# Patient Record
Sex: FEMALE | Race: WHITE | NOT HISPANIC OR LATINO | Employment: OTHER | URBAN - METROPOLITAN AREA
[De-identification: names, ages, dates, MRNs, and addresses within clinical notes are randomized per-mention and may not be internally consistent; named-entity substitution may affect disease eponyms.]

---

## 2018-06-18 DIAGNOSIS — R13.12 OROPHARYNGEAL DYSPHAGIA: Primary | ICD-10-CM

## 2018-06-27 ENCOUNTER — HOSPITAL ENCOUNTER (OUTPATIENT)
Dept: RADIOLOGY | Facility: HOSPITAL | Age: 78
Discharge: HOME | End: 2018-06-27
Attending: OTOLARYNGOLOGY
Payer: MEDICARE

## 2018-06-27 DIAGNOSIS — R13.12 OROPHARYNGEAL DYSPHAGIA: ICD-10-CM

## 2018-06-27 PROCEDURE — G8996 SWALLOW CURRENT STATUS: HCPCS | Mod: CI

## 2018-06-27 PROCEDURE — 74230 X-RAY XM SWLNG FUNCJ C+: CPT | Mod: 26,,, | Performed by: RADIOLOGY

## 2018-06-27 PROCEDURE — 74230 X-RAY XM SWLNG FUNCJ C+: CPT | Mod: TC

## 2018-06-27 PROCEDURE — 92611 MOTION FLUOROSCOPY/SWALLOW: CPT

## 2018-06-27 PROCEDURE — 97535 SELF CARE MNGMENT TRAINING: CPT

## 2018-06-27 PROCEDURE — G8997 SWALLOW GOAL STATUS: HCPCS | Mod: CI

## 2018-06-27 PROCEDURE — G8998 SWALLOW D/C STATUS: HCPCS | Mod: CI

## 2018-06-27 NOTE — PROCEDURES
"Modified Barium Swallow    Patient Name:  Marya Carranza   MRN:  [id number]      Recommendations:     Recommendations:                General Recommendations:  Dysphagia therapy with outpatient rehab  Diet recommendations:  Regular, Thin   Aspiration Precautions: 1 bite/sip at a time, Alternating bites/sips, Feed only when awake/alert, Frequent oral care, HOB to 90 degrees, No straws, Remain upright 30 minutes post meal, Small bites/sips and Strict aspiration precautions   General Precautions: Standard,    Communication strategies:  none    Referral     Reason for Referral  Patient was referred for a Modified Barium Swallow Study to assess the efficiency of his/her swallow function, rule out aspiration and make recommendations regarding safe dietary consistencies, effective compensatory strategies, and safe eating environment. Pt reporting "choking on own saliva" and difficulty "getting food down." Pt with PMH significant for GERD--pt on RX for reflux. Pt reported swallowing difficulties began 1 year ago--no PNA or weight loss reported.    Objective:     Current Respiratory Status: 06/27/18    Alert: yes    Cooperative: yes    Follows Directions: yes    Visualization  · Patient was seen in the lateral view    Oral Peripheral Examination  · Oral Musculature: WNL  · Dentition: present and adequate  · Secretion Management: adequate  · Mucosal Quality: good  · Mandibular Strength and Mobility: WNL  · Oral Labial Strength and Mobility: WNL  · Lingual Strength and Mobility: WNL  · Buccal Strength and Mobility: WNL  · Volitional Cough: Strong  · Volitional Swallow: Weak  · Voice Prior to PO Intake: Strong, clear, dry    Consistencies Assessed  · Thin (x1 10cc cup sip, x4 self-regulated, self-fed cup sips)  · Nectar thick (x1 self-regulated, self-fed cup sip)  · Puree (x2 full tsp bites)  · Solids (x1 1/2 piece of argentina cracker with barium)    Oral Preparation/Oral Phase  · WFL- Pt with adequate bolus acceptance, containment, " control and timely A-P transfer across consistencies     Pharyngeal Phase   Pt presenting with moderate pharyngeal dysphagia c/b dcr hyolaryngeal elevation resulting in dcr airway closure and flash penetration of thin liquids, nectar thick liquids, and purees at level of laryngeal vestibule. No penetration noted with solids. No aspiration noted with any consistencies during limited MBSS. Deviant epiglottic inversion noted contributing to dcr airway closure and flash penetration of thin liquids, nectar thick liquids, and puree consistencies. With each instance of flash penetration, pt demonstrated throat clear. Delayed coughing noted after swallow--when flouro turned on, no aspiration materials noted in airway at conclusion of MBSS. Mild dcr in BOT retraction noted with mild vallecular residue noted after swallow. Pt able to clear mild vallecular residue independently with liquid wash or subsequent spontaneous swallow.     Cervical Esophageal Phase  · Decreased UES opening--mild pyriform sinus residue noted after swallow  · Retrograde flow of all consistencies noted.  · Opening peristalsis     Assessment:     Impressions  ·   Patient demonstrates moderate  pharyngeal dysphagia characterized by dcr hyolaryngeal elevation resulting in dcr airway closure and flash penetration of thin liquids, nectar thick liquids, and purees at level of laryngeal vestibule. Deviant epiglottic inversion noted contributing to dcr airway closure and flash penetration. Mild dcr in BOT retraction noted with mild vallecular residue noted after swallow.     Prognosis: Good    Barriers:  · None    Plan  Pt is safe for regular solids/thin liquids with strict adherence to aforementioned swallow precautions. Pt would benefit from skilled ST services in outpatient setting for indirect and direct dysphagia tx. Recommend pt f/u with esophagram in order to re-assess reflux and esophageal component of dysphagia.    Education  Results were discussed  with patient. Results were discussed with Medical Team who was in agreement with plan.      Recommendations:  outpatient speech therapy     Time Tracking:   SLP Treatment Date:   06/27/18  Speech Start Time:  0945  Speech Stop Time:  1015     Speech Total Time (min):  30 min    Aparna Garcia CCC-SLP  06/27/2018

## 2018-07-11 ENCOUNTER — TELEPHONE (OUTPATIENT)
Dept: OTOLARYNGOLOGY | Facility: CLINIC | Age: 78
End: 2018-07-11

## 2018-07-11 DIAGNOSIS — R13.12 OROPHARYNGEAL DYSPHAGIA: Primary | ICD-10-CM

## 2019-01-02 ENCOUNTER — HOSPITAL ENCOUNTER (OUTPATIENT)
Dept: RADIOLOGY | Facility: HOSPITAL | Age: 79
Discharge: HOME | End: 2019-01-02
Attending: INTERNAL MEDICINE
Payer: MEDICARE

## 2019-01-02 DIAGNOSIS — J40 BRONCHITIS: ICD-10-CM

## 2019-01-02 DIAGNOSIS — J40 BRONCHITIS: Primary | ICD-10-CM

## 2019-01-02 PROCEDURE — 71046 XR CHEST PA AND LATERAL: ICD-10-PCS | Mod: 26,,, | Performed by: RADIOLOGY

## 2019-01-02 PROCEDURE — 71046 X-RAY EXAM CHEST 2 VIEWS: CPT | Mod: 26,,, | Performed by: RADIOLOGY

## 2019-01-02 PROCEDURE — 71046 X-RAY EXAM CHEST 2 VIEWS: CPT | Mod: TC,FY

## 2019-10-11 ENCOUNTER — OFFICE VISIT (OUTPATIENT)
Dept: CARDIOLOGY | Facility: CLINIC | Age: 79
End: 2019-10-11
Payer: MEDICARE

## 2019-10-11 VITALS
DIASTOLIC BLOOD PRESSURE: 83 MMHG | SYSTOLIC BLOOD PRESSURE: 174 MMHG | RESPIRATION RATE: 16 BRPM | OXYGEN SATURATION: 95 % | WEIGHT: 137.25 LBS | HEART RATE: 78 BPM

## 2019-10-11 DIAGNOSIS — I25.10 CORONARY ARTERY DISEASE, ANGINA PRESENCE UNSPECIFIED, UNSPECIFIED VESSEL OR LESION TYPE, UNSPECIFIED WHETHER NATIVE OR TRANSPLANTED HEART: Primary | ICD-10-CM

## 2019-10-11 DIAGNOSIS — Z76.89 ESTABLISHING CARE WITH NEW DOCTOR, ENCOUNTER FOR: ICD-10-CM

## 2019-10-11 PROCEDURE — 99999 PR PBB SHADOW E&M-EST. PATIENT-LVL III: ICD-10-PCS | Mod: PBBFAC,,, | Performed by: INTERNAL MEDICINE

## 2019-10-11 PROCEDURE — 93000 EKG 12-LEAD: ICD-10-PCS | Mod: S$GLB,,, | Performed by: INTERNAL MEDICINE

## 2019-10-11 PROCEDURE — 99204 PR OFFICE/OUTPT VISIT, NEW, LEVL IV, 45-59 MIN: ICD-10-PCS | Mod: S$GLB,,, | Performed by: INTERNAL MEDICINE

## 2019-10-11 PROCEDURE — 1101F PR PT FALLS ASSESS DOC 0-1 FALLS W/OUT INJ PAST YR: ICD-10-PCS | Mod: CPTII,S$GLB,, | Performed by: INTERNAL MEDICINE

## 2019-10-11 PROCEDURE — 99204 OFFICE O/P NEW MOD 45 MIN: CPT | Mod: S$GLB,,, | Performed by: INTERNAL MEDICINE

## 2019-10-11 PROCEDURE — 1101F PT FALLS ASSESS-DOCD LE1/YR: CPT | Mod: CPTII,S$GLB,, | Performed by: INTERNAL MEDICINE

## 2019-10-11 PROCEDURE — 99999 PR PBB SHADOW E&M-EST. PATIENT-LVL III: CPT | Mod: PBBFAC,,, | Performed by: INTERNAL MEDICINE

## 2019-10-11 PROCEDURE — 93000 ELECTROCARDIOGRAM COMPLETE: CPT | Mod: S$GLB,,, | Performed by: INTERNAL MEDICINE

## 2019-10-11 RX ORDER — METOPROLOL SUCCINATE 25 MG/1
25 TABLET, EXTENDED RELEASE ORAL DAILY
COMMUNITY
End: 2019-10-11

## 2019-10-11 RX ORDER — METFORMIN HYDROCHLORIDE 1000 MG/1
1000 TABLET ORAL 2 TIMES DAILY WITH MEALS
Refills: 2 | COMMUNITY
Start: 2019-07-16

## 2019-10-11 RX ORDER — PRAVASTATIN SODIUM 40 MG/1
40 TABLET ORAL DAILY
Refills: 3 | COMMUNITY
Start: 2019-08-18

## 2019-10-11 RX ORDER — LOSARTAN POTASSIUM AND HYDROCHLOROTHIAZIDE 25; 100 MG/1; MG/1
1 TABLET ORAL DAILY
Refills: 1 | COMMUNITY
Start: 2019-08-21

## 2019-10-11 RX ORDER — GLIPIZIDE 10 MG/1
10 TABLET ORAL 2 TIMES DAILY
Refills: 1 | COMMUNITY
Start: 2019-08-20

## 2019-10-11 NOTE — PROGRESS NOTES
CARDIOVASCULAR CONSULTATION    REASON FOR CONSULT:   Marya Carranza is a 79 y.o. female who presents for EVALUATION OF DYSPNEA ON EXERTION     HISTORY OF PRESENT ILLNESS:     Patient is a pleasant 79-year-old lady.  Past medical history significant for diabetes and hypertension.  States that over the past few months has been experiencing worsening dyspnea on exertion.  Denies any chest pains or tightness.  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  She has been stressed out lately as she lost her daughter to long-term illness last week.  Does complain of orthopnea.  Denies PND or swelling of feet.      PAST MEDICAL HISTORY:   History reviewed. No pertinent past medical history.    PAST SURGICAL HISTORY:   History reviewed. No pertinent surgical history.    ALLERGIES AND MEDICATION:   Review of patient's allergies indicates:  No Known Allergies     Medication List           Accurate as of October 11, 2019 11:00 AM. If you have any questions, ask your nurse or doctor.               CONTINUE taking these medications    glipiZIDE 10 MG tablet  Commonly known as:  GLUCOTROL     losartan-hydrochlorothiazide 100-25 mg 100-25 mg per tablet  Commonly known as:  HYZAAR     metFORMIN 1000 MG tablet  Commonly known as:  GLUCOPHAGE     pravastatin 40 MG tablet  Commonly known as:  PRAVACHOL        STOP taking these medications    metoprolol succinate 25 MG 24 hr tablet  Commonly known as:  TOPROL-XL  Stopped by:  Jorden Sumner MD            SOCIAL HISTORY:     Social History     Socioeconomic History    Marital status:      Spouse name: Not on file    Number of children: Not on file    Years of education: Not on file    Highest education level: Not on file   Occupational History    Not on file   Social Needs    Financial resource strain: Not on file    Food insecurity:     Worry: Not on file     Inability: Not on file    Transportation needs:     Medical: Not on file      Non-medical: Not on file   Tobacco Use    Smoking status: Never Smoker    Smokeless tobacco: Never Used   Substance and Sexual Activity    Alcohol use: Not on file    Drug use: Not on file    Sexual activity: Not on file   Lifestyle    Physical activity:     Days per week: Not on file     Minutes per session: Not on file    Stress: Not on file   Relationships    Social connections:     Talks on phone: Not on file     Gets together: Not on file     Attends Christianity service: Not on file     Active member of club or organization: Not on file     Attends meetings of clubs or organizations: Not on file     Relationship status: Not on file   Other Topics Concern    Not on file   Social History Narrative    Not on file       FAMILY HISTORY:   History reviewed. No pertinent family history.    REVIEW OF SYSTEMS:   Review of Systems   Constitution: Negative.   HENT: Negative.    Eyes: Negative.    Cardiovascular: Positive for dyspnea on exertion and orthopnea. Negative for paroxysmal nocturnal dyspnea.   Respiratory: Negative.    Endocrine: Negative.    Hematologic/Lymphatic: Negative.    Skin: Negative.    Musculoskeletal: Negative.    Gastrointestinal: Negative.    Genitourinary: Negative.    Neurological: Negative.    Psychiatric/Behavioral: Negative.    Allergic/Immunologic: Negative.        A 10 point review of systems was performed and all the pertinent positives have been mentioned. Rest of review of systems was negative.        PHYSICAL EXAM:     Vitals:    10/11/19 1036   BP: (!) 174/83   Pulse: 78   Resp: 16    There is no height or weight on file to calculate BMI.  Weight: 62.2 kg (137 lb 3.8 oz)         Physical Exam   Constitutional: She is oriented to person, place, and time. She appears well-developed and well-nourished.   HENT:   Head: Normocephalic.   Eyes: Pupils are equal, round, and reactive to light.   Neck: Normal range of motion. Neck supple.   Cardiovascular: Normal rate and regular  rhythm.   Pulmonary/Chest: Effort normal and breath sounds normal.   Abdominal: Soft. Normal appearance and bowel sounds are normal. There is no tenderness.   Musculoskeletal: Normal range of motion.   Neurological: She is alert and oriented to person, place, and time.   Skin: Skin is warm.   Psychiatric: She has a normal mood and affect.         DATA:     Laboratory:  CBC:        CHEMISTRIES:        CARDIAC BIOMARKERS:        COAGS:        LIPIDS/LFTS:        Hemoglobin A1C   Date Value Ref Range Status   05/03/2011 8.9 (H) 4.0 - 6.2 % Final       TSH        The ASCVD Risk score ([place] ABDIAZIZ Jr., et al., 2013) failed to calculate for the following reasons:    The 2013 ASCVD risk score is only valid for ages 40 to 79           Cardiovascular Testing:    EKG: (personally reviewed tracing)  Normal sinus rhythm, anterior infarct      ASSESSMENT AND PLAN     Dyspnea on exertion.  Patient with dyspnea on exertion hope of new onset.  Multiple risk factors for coronary artery disease.  Check 2D echocardiogram and stress test.    Hypertension:  Uncontrolled.  Increase Toprol-XL to 50 mg daily.            Thank you very much for involving me in the care of your patient.  Please do not hesitate to contact me if there are any questions.      Jorden Sumner MD, FACC, [facility]  Interventional Cardiologist, [facility].           This note was dictated with the help of speech recognition software.  There might be un-intended errors and/or substitutions.

## 2019-10-14 DIAGNOSIS — Z12.31 BREAST CANCER SCREENING BY MAMMOGRAM: Primary | ICD-10-CM

## 2019-10-29 RX ORDER — METOPROLOL SUCCINATE 50 MG/1
50 TABLET, EXTENDED RELEASE ORAL DAILY
Qty: 90 TABLET | Refills: 3 | Status: SHIPPED | OUTPATIENT
Start: 2019-10-29

## 2019-11-22 ENCOUNTER — HOSPITAL ENCOUNTER (OUTPATIENT)
Dept: RADIOLOGY | Facility: HOSPITAL | Age: 79
Discharge: HOME | End: 2019-11-22
Attending: INTERNAL MEDICINE
Payer: MEDICARE

## 2019-11-22 VITALS — WEIGHT: 137.13 LBS

## 2019-11-22 DIAGNOSIS — Z12.31 BREAST CANCER SCREENING BY MAMMOGRAM: ICD-10-CM

## 2019-11-22 PROCEDURE — 77067 MAMMO DIGITAL SCREENING BILAT WITH CAD: ICD-10-PCS | Mod: 26,,, | Performed by: RADIOLOGY

## 2019-11-22 PROCEDURE — 77067 SCR MAMMO BI INCL CAD: CPT | Mod: TC

## 2019-11-22 PROCEDURE — 77067 SCR MAMMO BI INCL CAD: CPT | Mod: 26,,, | Performed by: RADIOLOGY

## 2021-04-28 ENCOUNTER — PATIENT MESSAGE (OUTPATIENT)
Dept: RESEARCH | Facility: HOSPITAL | Age: 81
End: 2021-04-28

## 2021-06-15 ENCOUNTER — OFFICE VISIT (OUTPATIENT)
Dept: CARDIOLOGY | Facility: CLINIC | Age: 81
End: 2021-06-15
Payer: MEDICARE

## 2021-06-15 VITALS
BODY MASS INDEX: 25.02 KG/M2 | DIASTOLIC BLOOD PRESSURE: 74 MMHG | HEIGHT: 61 IN | HEART RATE: 67 BPM | WEIGHT: 132.5 LBS | OXYGEN SATURATION: 97 % | SYSTOLIC BLOOD PRESSURE: 140 MMHG

## 2021-06-15 DIAGNOSIS — R07.9 CHEST PAIN, UNSPECIFIED TYPE: ICD-10-CM

## 2021-06-15 DIAGNOSIS — R06.02 SOB (SHORTNESS OF BREATH): ICD-10-CM

## 2021-06-15 DIAGNOSIS — R06.09 DOE (DYSPNEA ON EXERTION): Primary | ICD-10-CM

## 2021-06-15 DIAGNOSIS — R06.09 DYSPNEA ON EXERTION: ICD-10-CM

## 2021-06-15 PROCEDURE — 3288F PR FALLS RISK ASSESSMENT DOCUMENTED: ICD-10-PCS | Mod: CPTII,S$GLB,, | Performed by: INTERNAL MEDICINE

## 2021-06-15 PROCEDURE — 99214 PR OFFICE/OUTPT VISIT, EST, LEVL IV, 30-39 MIN: ICD-10-PCS | Mod: S$GLB,,, | Performed by: INTERNAL MEDICINE

## 2021-06-15 PROCEDURE — 3288F FALL RISK ASSESSMENT DOCD: CPT | Mod: CPTII,S$GLB,, | Performed by: INTERNAL MEDICINE

## 2021-06-15 PROCEDURE — 99214 OFFICE O/P EST MOD 30 MIN: CPT | Mod: S$GLB,,, | Performed by: INTERNAL MEDICINE

## 2021-06-15 PROCEDURE — 1159F MED LIST DOCD IN RCRD: CPT | Mod: S$GLB,,, | Performed by: INTERNAL MEDICINE

## 2021-06-15 PROCEDURE — 99999 PR PBB SHADOW E&M-EST. PATIENT-LVL III: CPT | Mod: PBBFAC,,, | Performed by: INTERNAL MEDICINE

## 2021-06-15 PROCEDURE — 1126F PR PAIN SEVERITY QUANTIFIED, NO PAIN PRESENT: ICD-10-PCS | Mod: S$GLB,,, | Performed by: INTERNAL MEDICINE

## 2021-06-15 PROCEDURE — 99999 PR PBB SHADOW E&M-EST. PATIENT-LVL III: ICD-10-PCS | Mod: PBBFAC,,, | Performed by: INTERNAL MEDICINE

## 2021-06-15 PROCEDURE — 1159F PR MEDICATION LIST DOCUMENTED IN MEDICAL RECORD: ICD-10-PCS | Mod: S$GLB,,, | Performed by: INTERNAL MEDICINE

## 2021-06-15 PROCEDURE — 1101F PT FALLS ASSESS-DOCD LE1/YR: CPT | Mod: CPTII,S$GLB,, | Performed by: INTERNAL MEDICINE

## 2021-06-15 PROCEDURE — 1126F AMNT PAIN NOTED NONE PRSNT: CPT | Mod: S$GLB,,, | Performed by: INTERNAL MEDICINE

## 2021-06-15 PROCEDURE — 1101F PR PT FALLS ASSESS DOC 0-1 FALLS W/OUT INJ PAST YR: ICD-10-PCS | Mod: CPTII,S$GLB,, | Performed by: INTERNAL MEDICINE

## 2021-06-15 RX ORDER — METOPROLOL SUCCINATE 100 MG/1
100 TABLET, EXTENDED RELEASE ORAL DAILY
COMMUNITY
Start: 2021-05-03

## 2021-06-15 RX ORDER — OMEPRAZOLE 20 MG/1
20 CAPSULE, DELAYED RELEASE ORAL 2 TIMES DAILY
COMMUNITY
Start: 2021-04-22

## 2021-07-08 ENCOUNTER — HOSPITAL ENCOUNTER (OUTPATIENT)
Dept: RADIOLOGY | Facility: HOSPITAL | Age: 81
Discharge: HOME | End: 2021-07-08
Attending: INTERNAL MEDICINE
Payer: MEDICARE

## 2021-07-08 ENCOUNTER — HOSPITAL ENCOUNTER (OUTPATIENT)
Dept: CARDIOLOGY | Facility: HOSPITAL | Age: 81
Discharge: HOME | End: 2021-07-08
Attending: INTERNAL MEDICINE
Payer: MEDICARE

## 2021-07-08 VITALS — HEIGHT: 61 IN | WEIGHT: 132 LBS | BODY MASS INDEX: 24.92 KG/M2

## 2021-07-08 DIAGNOSIS — R06.02 SOB (SHORTNESS OF BREATH): ICD-10-CM

## 2021-07-08 DIAGNOSIS — R06.09 DOE (DYSPNEA ON EXERTION): ICD-10-CM

## 2021-07-08 LAB
AORTIC ROOT ANNULUS: 2.37 CM
AORTIC VALVE CUSP SEPERATION: 1.55 CM
ASCENDING AORTA: 2.46 CM
AV INDEX (PROSTH): 0.73
AV MEAN GRADIENT: 4 MMHG
AV PEAK GRADIENT: 6 MMHG
AV VALVE AREA: 2.27 CM2
AV VELOCITY RATIO: 0.77
BSA FOR ECHO PROCEDURE: 1.61 M2
CV ECHO LV RWT: 0.6 CM
CV STRESS BASE HR: 73 BPM
DIASTOLIC BLOOD PRESSURE: 66 MMHG
DOP CALC AO PEAK VEL: 1.22 M/S
DOP CALC AO VTI: 35.33 CM
DOP CALC LVOT AREA: 3.1 CM2
DOP CALC LVOT DIAMETER: 1.99 CM
DOP CALC LVOT PEAK VEL: 0.94 M/S
DOP CALC LVOT STROKE VOLUME: 80.3 CM3
DOP CALCLVOT PEAK VEL VTI: 25.83 CM
E WAVE DECELERATION TIME: 233.42 MSEC
E/A RATIO: 0.63
E/E' RATIO: 24.25 M/S
ECHO LV POSTERIOR WALL: 1.14 CM (ref 0.6–1.1)
EJECTION FRACTION: 65 %
FRACTIONAL SHORTENING: 26 % (ref 28–44)
INTERVENTRICULAR SEPTUM: 1.14 CM (ref 0.6–1.1)
IVRT: 161.48 MSEC
LA MAJOR: 4.82 CM
LA MINOR: 4.82 CM
LA WIDTH: 3.51 CM
LEFT ATRIUM SIZE: 3.86 CM
LEFT ATRIUM VOLUME INDEX: 35.1 ML/M2
LEFT ATRIUM VOLUME: 55.51 CM3
LEFT INTERNAL DIMENSION IN SYSTOLE: 2.81 CM (ref 2.1–4)
LEFT VENTRICLE DIASTOLIC VOLUME INDEX: 39.14 ML/M2
LEFT VENTRICLE DIASTOLIC VOLUME: 61.84 ML
LEFT VENTRICLE MASS INDEX: 90 G/M2
LEFT VENTRICLE SYSTOLIC VOLUME INDEX: 18.8 ML/M2
LEFT VENTRICLE SYSTOLIC VOLUME: 29.77 ML
LEFT VENTRICULAR INTERNAL DIMENSION IN DIASTOLE: 3.8 CM (ref 3.5–6)
LEFT VENTRICULAR MASS: 141.94 G
LV LATERAL E/E' RATIO: 19.4 M/S
LV SEPTAL E/E' RATIO: 32.33 M/S
MV PEAK A VEL: 1.53 M/S
MV PEAK E VEL: 0.97 M/S
MV STENOSIS PRESSURE HALF TIME: 67.69 MS
MV VALVE AREA P 1/2 METHOD: 3.25 CM2
NUC STRESS DIASTOLIC VOLUME INDEX: 34
NUC STRESS EJECTION FRACTION: 68 %
NUC STRESS SYSTOLIC VOLUME INDEX: 11
OHS CV CPX 85 PERCENT MAX PREDICTED HEART RATE MALE: 114
OHS CV CPX MAX PREDICTED HEART RATE: 134
OHS CV CPX PATIENT IS FEMALE: 1
OHS CV CPX PATIENT IS MALE: 0
OHS CV CPX PEAK DIASTOLIC BLOOD PRESSURE: 63 MMHG
OHS CV CPX PEAK HEAR RATE: 85 BPM
OHS CV CPX PEAK RATE PRESSURE PRODUCT: NORMAL
OHS CV CPX PEAK SYSTOLIC BLOOD PRESSURE: 162 MMHG
OHS CV CPX PERCENT MAX PREDICTED HEART RATE ACHIEVED: 64
OHS CV CPX RATE PRESSURE PRODUCT PRESENTING: NORMAL
PISA TR MAX VEL: 1.03 M/S
PULM VEIN S/D RATIO: 1.53
PV PEAK D VEL: 0.34 M/S
PV PEAK S VEL: 0.52 M/S
PV PEAK VELOCITY: 1 CM/S
RA MAJOR: 4.33 CM
RA PRESSURE: 3 MMHG
RA WIDTH: 2.43 CM
RIGHT VENTRICULAR END-DIASTOLIC DIMENSION: 2.56 CM
RV TISSUE DOPPLER FREE WALL SYSTOLIC VELOCITY 1 (APICAL 4 CHAMBER VIEW): 9.27 CM/S
SINUS: 2.79 CM
STJ: 1.93 CM
SYSTOLIC BLOOD PRESSURE: 203 MMHG
TDI LATERAL: 0.05 M/S
TDI SEPTAL: 0.03 M/S
TDI: 0.04 M/S
TR MAX PG: 4 MMHG
TRICUSPID ANNULAR PLANE SYSTOLIC EXCURSION: 1.88 CM
TV REST PULMONARY ARTERY PRESSURE: 7 MMHG

## 2021-07-08 PROCEDURE — 93018 STRESS TEST WITH MYOCARDIAL PERFUSION (CUPID ONLY): ICD-10-PCS | Mod: ,,, | Performed by: INTERNAL MEDICINE

## 2021-07-08 PROCEDURE — 93017 CV STRESS TEST TRACING ONLY: CPT

## 2021-07-08 PROCEDURE — 78452 HT MUSCLE IMAGE SPECT MULT: CPT

## 2021-07-08 PROCEDURE — 93018 CV STRESS TEST I&R ONLY: CPT | Mod: ,,, | Performed by: INTERNAL MEDICINE

## 2021-07-08 PROCEDURE — 93016 CV STRESS TEST SUPVJ ONLY: CPT | Mod: ,,, | Performed by: INTERNAL MEDICINE

## 2021-07-08 PROCEDURE — A9502 TC99M TETROFOSMIN: HCPCS

## 2021-07-08 PROCEDURE — 93016 STRESS TEST WITH MYOCARDIAL PERFUSION (CUPID ONLY): ICD-10-PCS | Mod: ,,, | Performed by: INTERNAL MEDICINE

## 2021-07-08 PROCEDURE — 78452 STRESS TEST WITH MYOCARDIAL PERFUSION (CUPID ONLY): ICD-10-PCS | Mod: 26,,, | Performed by: INTERNAL MEDICINE

## 2021-07-08 PROCEDURE — 93306 TTE W/DOPPLER COMPLETE: CPT | Mod: 26,,, | Performed by: INTERNAL MEDICINE

## 2021-07-08 PROCEDURE — 93306 TTE W/DOPPLER COMPLETE: CPT

## 2021-07-08 PROCEDURE — 78452 HT MUSCLE IMAGE SPECT MULT: CPT | Mod: 26,,, | Performed by: INTERNAL MEDICINE

## 2021-07-08 PROCEDURE — 93306 ECHO (CUPID ONLY): ICD-10-PCS | Mod: 26,,, | Performed by: INTERNAL MEDICINE

## 2021-07-08 PROCEDURE — 63600175 PHARM REV CODE 636 W HCPCS: Performed by: INTERNAL MEDICINE

## 2021-07-08 RX ORDER — REGADENOSON 0.08 MG/ML
0.4 INJECTION, SOLUTION INTRAVENOUS ONCE
Status: COMPLETED | OUTPATIENT
Start: 2021-07-08 | End: 2021-07-08

## 2021-07-08 RX ADMIN — REGADENOSON 0.4 MG: 0.08 INJECTION, SOLUTION INTRAVENOUS at 08:07

## 2021-07-13 ENCOUNTER — OFFICE VISIT (OUTPATIENT)
Dept: CARDIOLOGY | Facility: CLINIC | Age: 81
End: 2021-07-13
Payer: MEDICARE

## 2021-07-13 VITALS
OXYGEN SATURATION: 96 % | DIASTOLIC BLOOD PRESSURE: 82 MMHG | HEIGHT: 61 IN | WEIGHT: 135.38 LBS | BODY MASS INDEX: 25.56 KG/M2 | HEART RATE: 70 BPM | SYSTOLIC BLOOD PRESSURE: 146 MMHG

## 2021-07-13 DIAGNOSIS — R06.09 DYSPNEA ON EXERTION: Primary | ICD-10-CM

## 2021-07-13 PROCEDURE — 1159F MED LIST DOCD IN RCRD: CPT | Mod: S$GLB,,, | Performed by: INTERNAL MEDICINE

## 2021-07-13 PROCEDURE — 3288F FALL RISK ASSESSMENT DOCD: CPT | Mod: CPTII,S$GLB,, | Performed by: INTERNAL MEDICINE

## 2021-07-13 PROCEDURE — 99999 PR PBB SHADOW E&M-EST. PATIENT-LVL III: CPT | Mod: PBBFAC,,, | Performed by: INTERNAL MEDICINE

## 2021-07-13 PROCEDURE — 1101F PR PT FALLS ASSESS DOC 0-1 FALLS W/OUT INJ PAST YR: ICD-10-PCS | Mod: CPTII,S$GLB,, | Performed by: INTERNAL MEDICINE

## 2021-07-13 PROCEDURE — 1126F PR PAIN SEVERITY QUANTIFIED, NO PAIN PRESENT: ICD-10-PCS | Mod: S$GLB,,, | Performed by: INTERNAL MEDICINE

## 2021-07-13 PROCEDURE — 1159F PR MEDICATION LIST DOCUMENTED IN MEDICAL RECORD: ICD-10-PCS | Mod: S$GLB,,, | Performed by: INTERNAL MEDICINE

## 2021-07-13 PROCEDURE — 99214 PR OFFICE/OUTPT VISIT, EST, LEVL IV, 30-39 MIN: ICD-10-PCS | Mod: S$GLB,,, | Performed by: INTERNAL MEDICINE

## 2021-07-13 PROCEDURE — 99214 OFFICE O/P EST MOD 30 MIN: CPT | Mod: S$GLB,,, | Performed by: INTERNAL MEDICINE

## 2021-07-13 PROCEDURE — 99999 PR PBB SHADOW E&M-EST. PATIENT-LVL III: ICD-10-PCS | Mod: PBBFAC,,, | Performed by: INTERNAL MEDICINE

## 2021-07-13 PROCEDURE — 1101F PT FALLS ASSESS-DOCD LE1/YR: CPT | Mod: CPTII,S$GLB,, | Performed by: INTERNAL MEDICINE

## 2021-07-13 PROCEDURE — 1126F AMNT PAIN NOTED NONE PRSNT: CPT | Mod: S$GLB,,, | Performed by: INTERNAL MEDICINE

## 2021-07-13 PROCEDURE — 3288F PR FALLS RISK ASSESSMENT DOCUMENTED: ICD-10-PCS | Mod: CPTII,S$GLB,, | Performed by: INTERNAL MEDICINE

## 2021-09-28 ENCOUNTER — TELEPHONE (OUTPATIENT)
Dept: GASTROENTEROLOGY | Facility: CLINIC | Age: 81
End: 2021-09-28

## 2021-12-27 ENCOUNTER — HOSPITAL ENCOUNTER (EMERGENCY)
Facility: HOSPITAL | Age: 81
Discharge: HOME | End: 2021-12-27
Attending: EMERGENCY MEDICINE
Payer: MEDICARE

## 2021-12-27 VITALS
DIASTOLIC BLOOD PRESSURE: 61 MMHG | OXYGEN SATURATION: 98 % | BODY MASS INDEX: 25.34 KG/M2 | HEIGHT: 63 IN | HEART RATE: 70 BPM | RESPIRATION RATE: 18 BRPM | TEMPERATURE: 99 F | WEIGHT: 143 LBS | SYSTOLIC BLOOD PRESSURE: 120 MMHG

## 2021-12-27 DIAGNOSIS — U07.1 COVID-19: Primary | ICD-10-CM

## 2021-12-27 LAB
CTP QC/QA: YES
SARS-COV-2 RDRP RESP QL NAA+PROBE: POSITIVE

## 2021-12-27 PROCEDURE — 25000003 PHARM REV CODE 250: Mod: ER | Performed by: PHYSICIAN ASSISTANT

## 2021-12-27 PROCEDURE — 99284 EMERGENCY DEPT VISIT MOD MDM: CPT | Mod: 25,ER

## 2021-12-27 PROCEDURE — U0002 COVID-19 LAB TEST NON-CDC: HCPCS | Mod: ER | Performed by: EMERGENCY MEDICINE

## 2021-12-27 RX ORDER — PROMETHAZINE HYDROCHLORIDE AND DEXTROMETHORPHAN HYDROBROMIDE 6.25; 15 MG/5ML; MG/5ML
5 SYRUP ORAL EVERY 4 HOURS PRN
Qty: 50 ML | Refills: 0 | Status: SHIPPED | OUTPATIENT
Start: 2021-12-27 | End: 2021-12-30

## 2021-12-27 RX ORDER — BENZONATATE 100 MG/1
100 CAPSULE ORAL 3 TIMES DAILY PRN
Qty: 20 CAPSULE | Refills: 0 | Status: SHIPPED | OUTPATIENT
Start: 2021-12-27 | End: 2022-01-03

## 2021-12-27 RX ORDER — ACETAMINOPHEN 500 MG
500 TABLET ORAL
Status: COMPLETED | OUTPATIENT
Start: 2021-12-27 | End: 2021-12-27

## 2021-12-27 RX ORDER — IBUPROFEN 600 MG/1
600 TABLET ORAL EVERY 6 HOURS PRN
Qty: 20 TABLET | Refills: 0 | Status: SHIPPED | OUTPATIENT
Start: 2021-12-27 | End: 2022-01-01

## 2021-12-27 RX ORDER — BENZONATATE 100 MG/1
100 CAPSULE ORAL
Status: COMPLETED | OUTPATIENT
Start: 2021-12-27 | End: 2021-12-27

## 2021-12-27 RX ORDER — ACETAMINOPHEN 500 MG
500 TABLET ORAL EVERY 4 HOURS PRN
Qty: 20 TABLET | Refills: 0 | Status: SHIPPED | OUTPATIENT
Start: 2021-12-27 | End: 2022-01-01

## 2021-12-27 RX ADMIN — ACETAMINOPHEN 500 MG: 500 TABLET ORAL at 02:12

## 2021-12-27 RX ADMIN — BENZONATATE 100 MG: 100 CAPSULE ORAL at 02:12

## 2021-12-27 NOTE — DISCHARGE INSTRUCTIONS

## 2021-12-27 NOTE — ED PROVIDER NOTES
Encounter Date: 12/27/2021       History     Chief Complaint   Patient presents with    COVID-19 Concerns     Cough and  chest congestion, for 10 days, low intermittent fever, diarrhea     Chief complaint:  COVID-19 concern    HPI:    81-year-old female history of hypertension, diabetes presenting for evaluation of today history of productive cough with green sputum and intermittent chest pain and sore throat that occurs with cough.  She reports associated burning pain to her ears bilaterally.  Denies any recent sick contacts.  Denies fever, shortness of breath, dizziness lightheadedness, vomiting, diarrhea or other associated symptoms.        Review of patient's allergies indicates:  No Known Allergies  Past Medical History:   Diagnosis Date    Diabetes mellitus     Hypertension      Past Surgical History:   Procedure Laterality Date    HYSTERECTOMY      OOPHORECTOMY       Family History   Problem Relation Age of Onset    Breast cancer Sister      Social History     Tobacco Use    Smoking status: Never Smoker    Smokeless tobacco: Never Used     Review of Systems   Constitutional: Negative for chills and fever.   HENT: Positive for sore throat. Negative for congestion, ear pain, nosebleeds, rhinorrhea and trouble swallowing.    Eyes: Negative for redness.   Respiratory: Positive for cough. Negative for shortness of breath and stridor.    Cardiovascular: Positive for chest pain.   Gastrointestinal: Negative for abdominal pain, constipation, diarrhea, nausea and vomiting.   Genitourinary: Negative for decreased urine volume, dysuria, frequency, hematuria and urgency.   Musculoskeletal: Negative for back pain and neck pain.   Skin: Negative for rash and wound.   Neurological: Negative for dizziness, speech difficulty, weakness, light-headedness, numbness and headaches.   Hematological: Does not bruise/bleed easily.   Psychiatric/Behavioral: Negative for confusion.       Physical Exam     Initial Vitals  [12/27/21 1201]   BP Pulse Resp Temp SpO2   (!) 119/56 71 20 99 °F (37.2 °C) 96 %      MAP       --         Physical Exam    Nursing note and vitals reviewed.  Constitutional: She appears well-developed and well-nourished. No distress.   HENT:   Head: Normocephalic.   Right Ear: External ear normal.   Left Ear: External ear normal.   Mouth/Throat: Uvula is midline and mucous membranes are normal. Posterior oropharyngeal erythema present. No oropharyngeal exudate or posterior oropharyngeal edema.   Eyes: Conjunctivae are normal. Right eye exhibits no discharge. Left eye exhibits no discharge. No scleral icterus.   Neck: No tracheal deviation present.   Pulmonary/Chest: No stridor. No respiratory distress.   spo2 96% on RA      Musculoskeletal:         General: Normal range of motion.     Neurological: She is alert.   Skin: Skin is warm and dry. No rash noted. No erythema.   Psychiatric: She has a normal mood and affect. Her behavior is normal. Judgment and thought content normal.         ED Course   Procedures  Labs Reviewed   SARS-COV-2 RDRP GENE - Abnormal; Notable for the following components:       Result Value    POC Rapid COVID Positive (*)     All other components within normal limits    Narrative:     This test utilizes isothermal nucleic acid amplification   technology to detect the SARS-CoV-2 RdRp nucleic acid segment.   The analytical sensitivity (limit of detection) is 125 genome   equivalents/mL.   A POSITIVE result implies infection with the SARS-CoV-2 virus;   the patient is presumed to be contagious.     A NEGATIVE result means that SARS-CoV-2 nucleic acids are not   present above the limit of detection. A NEGATIVE result should be   treated as presumptive. It does not rule out the possibility of   COVID-19 and should not be the sole basis for treatment decisions.   If COVID-19 is strongly suspected based on clinical and exposure   history, re-testing using an alternate molecular assay should be    considered.   This test is only for use under the Food and Drug   Administration s Emergency Use Authorization (EUA).   Commercial kits are provided by [employer].   Performance characteristics of the EUA have been independently   verified by [facility] Department of   Pathology and Laboratory Medicine.   _________________________________________________________________   The authorized Fact Sheet for Healthcare Providers and the authorized Fact   Sheet for Patients of the ID NOW COVID-19 are available on the FDA   website:     https://www.fda.gov/media/299070/download  https://www.fda.gov/media/769870/download              Imaging Results    None          Medications   benzonatate capsule 100 mg (100 mg Oral Given 12/27/21 1408)   acetaminophen tablet 500 mg (500 mg Oral Given 12/27/21 1409)     Medical Decision Making:   ED Management:  81 yr old patient presenting with constellation of symptoms most c/w COVID 19 with a positive COVID 19 test.     Also considered but less likely:  PTA/RPA: no hot potato voice, no uvular deviation,  Esophageal rupture: No history of dysphagia  Unlikely deep space infection/Alex's  Low suspicion for CNS infection or bacterial sinusitis given exam and history.   No respiratory distress, otherwise relatively well appearing and nontoxic. Ambulatory O2 sats of 94 and patient in no acute distress. Return precautions given, patient understands and agrees with plan. All questions answered.  Instructed to follow up with PCP. There is currently no accepted treatment except conservative measures and respiratory support if appropriate.  This patient will be discharged home with strict return precautions if worsening respiratory status.  Patient was made aware other resource limitations and the fact that they could have worsening symptoms.  Patient was informed to quarantine themselves for per guidelines.                         Clinical Impression:   Final  diagnoses:  [U07.1] COVID-19 (Primary)          ED Disposition Condition    Discharge Stable        ED Prescriptions     Medication Sig Dispense Start Date End Date Auth. Provider    ibuprofen (ADVIL,MOTRIN) 600 MG tablet Take 1 tablet (600 mg total) by mouth every 6 (six) hours as needed for Pain. 20 tablet 12/27/2021 1/1/2022 Lisa Gilbert PA-C    acetaminophen (TYLENOL) 500 MG tablet Take 1 tablet (500 mg total) by mouth every 4 (four) hours as needed. 20 tablet 12/27/2021 1/1/2022 Lisa Gilbert PA-C    benzonatate (TESSALON) 100 MG capsule Take 1 capsule (100 mg total) by mouth 3 (three) times daily as needed for Cough. 20 capsule 12/27/2021 1/3/2022 Lisa Gilbert PA-C    promethazine-dextromethorphan (PROMETHAZINE-DM) 6.25-15 mg/5 mL Syrp Take 5 mLs by mouth every 4 (four) hours as needed (nasal congestion, cough). MAY CAUSE DROWSINESS 50 mL 12/27/2021 12/30/2021 Lisa Gilbert PA-C        Follow-up Information     Follow up With Specialties Details Why Contact Info    Phillip Levy MD Internal Medicine Schedule an appointment as soon as possible for a visit in 2 days for follow up [address]  [address]  [facility] [address]  [phone]      [facility] ED Emergency Medicine Go to  As needed, If symptoms worsen [address] [facility] [address] [phone]           Lisa Gilbert PA-C  12/27/21 4105

## 2021-12-27 NOTE — Clinical Note
"Marya"Dhruv Carranza was seen and treated in our emergency department on 12/27/2021.     COVID-19 is present in our communities across the state. There is limited testing for COVID at this time, so not all patients can be tested. In this situation, your employee meets the following criteria:    Marya Carranza has met the criteria for COVID-19 testing and has a POSITIVE result. She can return to work once they are asymptomatic for 72 hours without the use of fever reducing medications AND at least ten days from the first positive result.     If you have any questions or concerns, or if I can be of further assistance, please do not hesitate to contact me.    Sincerely,             Lisa Gilbert PA-C"

## 2021-12-28 ENCOUNTER — INFUSION (OUTPATIENT)
Dept: INFECTIOUS DISEASES | Facility: HOSPITAL | Age: 81
End: 2021-12-28
Attending: INTERNAL MEDICINE
Payer: MEDICARE

## 2021-12-28 VITALS
WEIGHT: 143 LBS | HEIGHT: 63 IN | OXYGEN SATURATION: 99 % | SYSTOLIC BLOOD PRESSURE: 160 MMHG | RESPIRATION RATE: 16 BRPM | TEMPERATURE: 98 F | HEART RATE: 57 BPM | DIASTOLIC BLOOD PRESSURE: 67 MMHG | BODY MASS INDEX: 25.34 KG/M2

## 2021-12-28 DIAGNOSIS — U07.1 COVID-19: Primary | ICD-10-CM

## 2021-12-28 PROCEDURE — M0243 CASIRIVI AND IMDEVI INFUSION: HCPCS | Performed by: INTERNAL MEDICINE

## 2021-12-28 PROCEDURE — 25000003 PHARM REV CODE 250: Performed by: INTERNAL MEDICINE

## 2021-12-28 PROCEDURE — 63600175 PHARM REV CODE 636 W HCPCS: Performed by: INTERNAL MEDICINE

## 2021-12-28 RX ORDER — ALBUTEROL SULFATE 90 UG/1
2 AEROSOL, METERED RESPIRATORY (INHALATION)
Status: ACTIVE | OUTPATIENT
Start: 2021-12-28

## 2021-12-28 RX ORDER — EPINEPHRINE 0.3 MG/.3ML
0.3 INJECTION SUBCUTANEOUS
Status: ACTIVE | OUTPATIENT
Start: 2021-12-28

## 2021-12-28 RX ORDER — DIPHENHYDRAMINE HYDROCHLORIDE 50 MG/ML
25 INJECTION INTRAMUSCULAR; INTRAVENOUS ONCE AS NEEDED
Status: ACTIVE | OUTPATIENT
Start: 2021-12-28 | End: 2033-05-26

## 2021-12-28 RX ORDER — ACETAMINOPHEN 325 MG/1
650 TABLET ORAL ONCE AS NEEDED
Status: ACTIVE | OUTPATIENT
Start: 2021-12-28 | End: 2033-05-26

## 2021-12-28 RX ORDER — ONDANSETRON 4 MG/1
4 TABLET, ORALLY DISINTEGRATING ORAL ONCE AS NEEDED
Status: ACTIVE | OUTPATIENT
Start: 2021-12-28 | End: 2033-05-26

## 2021-12-28 RX ORDER — SODIUM CHLORIDE 0.9 % (FLUSH) 0.9 %
10 SYRINGE (ML) INJECTION
Status: ACTIVE | OUTPATIENT
Start: 2021-12-28

## 2021-12-28 RX ADMIN — CASIRIVIMAB AND IMDEVIMAB 600 MG: 600; 600 INJECTION, SOLUTION, CONCENTRATE INTRAVENOUS at 10:12

## 2022-09-22 ENCOUNTER — HOSPITAL ENCOUNTER (EMERGENCY)
Facility: HOSPITAL | Age: 82
Discharge: HOME | End: 2022-09-22
Attending: EMERGENCY MEDICINE
Payer: MEDICARE

## 2022-09-22 VITALS
TEMPERATURE: 98 F | WEIGHT: 140 LBS | DIASTOLIC BLOOD PRESSURE: 76 MMHG | HEART RATE: 69 BPM | OXYGEN SATURATION: 99 % | BODY MASS INDEX: 24.8 KG/M2 | RESPIRATION RATE: 16 BRPM | SYSTOLIC BLOOD PRESSURE: 194 MMHG | HEIGHT: 63 IN

## 2022-09-22 DIAGNOSIS — M54.32 LEFT SIDED SCIATICA: Primary | ICD-10-CM

## 2022-09-22 PROCEDURE — 99284 EMERGENCY DEPT VISIT MOD MDM: CPT | Mod: ER

## 2022-09-22 PROCEDURE — 63600175 PHARM REV CODE 636 W HCPCS: Mod: ER | Performed by: NURSE PRACTITIONER

## 2022-09-22 PROCEDURE — 96372 THER/PROPH/DIAG INJ SC/IM: CPT | Performed by: NURSE PRACTITIONER

## 2022-09-22 RX ORDER — SULINDAC 150 MG/1
150 TABLET ORAL 2 TIMES DAILY
Qty: 10 TABLET | Refills: 0 | Status: SHIPPED | OUTPATIENT
Start: 2022-09-22 | End: 2022-09-27

## 2022-09-22 RX ORDER — ONDANSETRON 2 MG/ML
4 INJECTION INTRAMUSCULAR; INTRAVENOUS
Status: DISCONTINUED | OUTPATIENT
Start: 2022-09-22 | End: 2022-09-22

## 2022-09-22 RX ORDER — BACLOFEN 10 MG/1
5 TABLET ORAL 3 TIMES DAILY
Qty: 7 TABLET | Refills: 0 | Status: SHIPPED | OUTPATIENT
Start: 2022-09-22

## 2022-09-22 RX ORDER — KETOROLAC TROMETHAMINE 30 MG/ML
15 INJECTION, SOLUTION INTRAMUSCULAR; INTRAVENOUS
Status: COMPLETED | OUTPATIENT
Start: 2022-09-22 | End: 2022-09-22

## 2022-09-22 RX ADMIN — KETOROLAC TROMETHAMINE 15 MG: 30 INJECTION, SOLUTION INTRAMUSCULAR; INTRAVENOUS at 10:09

## 2022-09-22 NOTE — Clinical Note
"Marya Diazerica Carranza was seen and treated in our emergency department on 9/22/2022.  She may return to work on 09/26/2022.       If you have any questions or concerns, please don't hesitate to call.      Carlos Pedro DNP"

## 2022-09-22 NOTE — ED PROVIDER NOTES
Encounter Date: 9/22/2022    SCRIBE #1 NOTE: I, Hallie Vidal, am scribing for, and in the presence of,  Carlos Pedro DNP. I have scribed the following portions of the note - Other sections scribed: HPI, ROS, PE.     History     Chief Complaint   Patient presents with    Leg Pain     Pt has non traumatic left leg pain that states at the left hip that radiates down her leg. The pain started last night     82 y.o. female with Hx of DM and HTN who presents to the ER for acute left hip pain radiating down her leg onset 6 PM last night. She describes the pain as aching. Patient denies any known falls or injury. She currently works cleaning rooms at a high school. Patient attempted treatment with Tylenol with no alleviation of pain.     The history is provided by the patient. No  was used.   Review of patient's allergies indicates:  No Known Allergies  Past Medical History:   Diagnosis Date    Diabetes mellitus     Hypertension      Past Surgical History:   Procedure Laterality Date    HYSTERECTOMY      OOPHORECTOMY       Family History   Problem Relation Age of Onset    Breast cancer Sister      Social History     Tobacco Use    Smoking status: Never    Smokeless tobacco: Never     Review of Systems   Constitutional:  Negative for chills, fatigue and fever.   HENT:  Negative for congestion, ear discharge, ear pain, postnasal drip, rhinorrhea, sinus pressure, sneezing, sore throat and voice change.    Eyes:  Negative for discharge and itching.   Respiratory:  Negative for cough, shortness of breath and wheezing.    Cardiovascular:  Negative for chest pain, palpitations and leg swelling.   Gastrointestinal:  Negative for abdominal pain, constipation, diarrhea, nausea and vomiting.   Endocrine: Negative for polydipsia, polyphagia and polyuria.   Genitourinary:  Negative for dysuria, frequency, hematuria and urgency.   Musculoskeletal:  Positive for arthralgias (L hip) and myalgias (L leg).   Skin:   Negative for rash and wound.   Neurological:  Negative for dizziness, seizures, syncope, weakness and numbness.   Hematological:  Negative for adenopathy. Does not bruise/bleed easily.   Psychiatric/Behavioral:  Negative for self-injury and suicidal ideas. The patient is not nervous/anxious.      Physical Exam     Initial Vitals [09/22/22 0938]   BP Pulse Resp Temp SpO2   (!) 190/80 70 16 97.8 °F (36.6 °C) 97 %      MAP       --         Physical Exam    Nursing note and vitals reviewed.  Constitutional: She appears well-developed and well-nourished.   HENT:   Head: Normocephalic and atraumatic.   Right Ear: External ear normal.   Left Ear: External ear normal.   Nose: Nose normal.   Eyes: Conjunctivae and EOM are normal. Pupils are equal, round, and reactive to light. Right eye exhibits no discharge. Left eye exhibits no discharge.   Neck:   Normal range of motion.  Abdominal: She exhibits no distension.   Musculoskeletal:      Cervical back: Normal range of motion.      Lumbar back: Positive left straight leg raise test.      Comments: No spinal tenderness or step-offs.      Neurological: She is alert and oriented to person, place, and time.   Skin: Skin is dry. Capillary refill takes less than 2 seconds.       ED Course   Procedures  Labs Reviewed - No data to display       Imaging Results    None          Medications   ketorolac injection 15 mg (15 mg Intramuscular Given 9/22/22 1045)     Medical Decision Making:   History:   Old Medical Records: I decided to obtain old medical records.  Initial Assessment:   82 y.o. female with Hx of DM and HTN who presents to the ER for acute left hip pain radiating down her leg onset 6 PM last night. On exam she had a positive left SLR. Patient was treated with ketorolac in the ER.  Differential Diagnosis:   Includes but is not limited to: Sciatica, Strain, Contusion, Arthritis        Scribe Attestation:   Scribe #1: I performed the above scribed service and the documentation  accurately describes the services I performed. I attest to the accuracy of the note.        ED Course as of 09/22/22 1921   Thu Sep 22, 2022   0958 BP(!): 190/80 [VC]   0958 Temp: 97.8 °F (36.6 °C) [VC]   0958 Temp src: Oral [VC]   0958 Pulse: 70 [VC]   0958 Resp: 16 [VC]   0958 SpO2: 97 % [VC]      ED Course User Index  [VC] Carlos ePdro DNP               Scribe attestation: Scribe attestation: I, Carlos Pedro DNP ACNP-BC FNP-C ENP-C,  personally performed the services described in this documentation. All medical record entries made by the scribe were at my direction and in my presence.  I have reviewed the chart and agree that the record reflects my personal performance and is accurate and complete.   Clinical Impression:   Final diagnoses:  [M54.32] Left sided sciatica (Primary)      ED Disposition Condition    Discharge Stable        See AVS for additional recommendations. Medications listed herein were prescribed after reviewing the patient's allergies, medication list, history, most recent laboratories as available.  Referrals below were provided after reviewing the patient's previous medical providers. She understands she  should return for any worsening or changes in condition.  Prior to discharge the patient was asked if she  had any additional concerns or complaints and she declined. The patient was given an opportunity to ask questions and all were answered to her satisfaction.  ED Prescriptions       Medication Sig Dispense Start Date End Date Auth. Provider    sulindac (CLINORIL) 150 MG tablet Take 1 tablet (150 mg total) by mouth 2 (two) times daily. for 5 days 10 tablet 9/22/2022 9/27/2022 Carlos Pedro DNP    baclofen (LIORESAL) 10 MG tablet Take 0.5 tablets (5 mg total) by mouth 3 (three) times daily. 7 tablet 9/22/2022 -- Carlos Pedro DNP          Follow-up Information       Follow up With Specialties Details Why Contact Info    Phillip Levy MD Internal Medicine  Schedule an appointment as soon as possible for a visit   [address]  [address]  [facility] [address]  [phone]               Carlos Pedro, KATHERINE  09/22/22 1921

## 2022-09-22 NOTE — DISCHARGE INSTRUCTIONS
You have been given a medication that causes drowsiness.  Do not operate motor vehicles, drink alcohol, or operate heavy machinery while taking this medication. Return to the Emergency Department for any worsening, change in condition, or any emergent concerns. You have been prescribed clinoril (sulindac), an anti-inflammatory.  Take this medication whether you feel you need it or not.  Do not take ibuprofen, naproxen or other NSAID's medications while taking this medication.

## 2024-06-23 ENCOUNTER — HOSPITAL ENCOUNTER (INPATIENT)
Facility: HOSPITAL | Age: 84
LOS: 1 days | Discharge: HOME HEALTH | DRG: 312 | End: 2024-06-25
Attending: STUDENT IN AN ORGANIZED HEALTH CARE EDUCATION/TRAINING PROGRAM | Admitting: STUDENT IN AN ORGANIZED HEALTH CARE EDUCATION/TRAINING PROGRAM
Payer: MEDICARE

## 2024-06-23 DIAGNOSIS — R55 SYNCOPE: ICD-10-CM

## 2024-06-23 DIAGNOSIS — M43.9 COMPRESSION DEFORMITY OF VERTEBRA: Primary | ICD-10-CM

## 2024-06-23 DIAGNOSIS — R07.9 CHEST PAIN: ICD-10-CM

## 2024-06-23 DIAGNOSIS — R93.7 ABNORMAL CT SCAN, CERVICAL SPINE: ICD-10-CM

## 2024-06-23 DIAGNOSIS — R06.02 SHORTNESS OF BREATH: ICD-10-CM

## 2024-06-23 PROBLEM — E11.9 DIABETES MELLITUS: Chronic | Status: ACTIVE | Noted: 2023-02-08

## 2024-06-23 PROBLEM — E11.9 DIABETES MELLITUS: Status: ACTIVE | Noted: 2023-02-08

## 2024-06-23 LAB
ALBUMIN SERPL BCP-MCNC: 4 G/DL (ref 3.5–5.2)
ALP SERPL-CCNC: 42 U/L (ref 55–135)
ALT SERPL W/O P-5'-P-CCNC: 8 U/L (ref 10–44)
ANION GAP SERPL CALC-SCNC: 16 MMOL/L (ref 8–16)
AST SERPL-CCNC: 17 U/L (ref 10–40)
BASOPHILS # BLD AUTO: 0.05 K/UL (ref 0–0.2)
BASOPHILS NFR BLD: 0.7 % (ref 0–1.9)
BILIRUB SERPL-MCNC: 0.5 MG/DL (ref 0.1–1)
BNP SERPL-MCNC: 59 PG/ML (ref 0–99)
BUN SERPL-MCNC: 22 MG/DL (ref 8–23)
CALCIUM SERPL-MCNC: 8.7 MG/DL (ref 8.7–10.5)
CHLORIDE SERPL-SCNC: 102 MMOL/L (ref 95–110)
CO2 SERPL-SCNC: 21 MMOL/L (ref 23–29)
CREAT SERPL-MCNC: 0.9 MG/DL (ref 0.5–1.4)
DIFFERENTIAL METHOD BLD: ABNORMAL
EOSINOPHIL # BLD AUTO: 0.2 K/UL (ref 0–0.5)
EOSINOPHIL NFR BLD: 2.1 % (ref 0–8)
ERYTHROCYTE [DISTWIDTH] IN BLOOD BY AUTOMATED COUNT: 13.6 % (ref 11.5–14.5)
EST. GFR  (NO RACE VARIABLE): >60 ML/MIN/1.73 M^2
GLUCOSE SERPL-MCNC: 107 MG/DL (ref 70–110)
HCT VFR BLD AUTO: 29.8 % (ref 37–48.5)
HGB BLD-MCNC: 10.2 G/DL (ref 12–16)
IMM GRANULOCYTES # BLD AUTO: 0.02 K/UL (ref 0–0.04)
IMM GRANULOCYTES NFR BLD AUTO: 0.3 % (ref 0–0.5)
LYMPHOCYTES # BLD AUTO: 1.5 K/UL (ref 1–4.8)
LYMPHOCYTES NFR BLD: 20.1 % (ref 18–48)
MCH RBC QN AUTO: 29.4 PG (ref 27–31)
MCHC RBC AUTO-ENTMCNC: 34.2 G/DL (ref 32–36)
MCV RBC AUTO: 86 FL (ref 82–98)
MONOCYTES # BLD AUTO: 0.8 K/UL (ref 0.3–1)
MONOCYTES NFR BLD: 10.9 % (ref 4–15)
NEUTROPHILS # BLD AUTO: 4.8 K/UL (ref 1.8–7.7)
NEUTROPHILS NFR BLD: 65.9 % (ref 38–73)
NRBC BLD-RTO: 0 /100 WBC
PLATELET # BLD AUTO: 241 K/UL (ref 150–450)
PMV BLD AUTO: 10.2 FL (ref 9.2–12.9)
POCT GLUCOSE: 83 MG/DL (ref 70–110)
POTASSIUM SERPL-SCNC: 3.8 MMOL/L (ref 3.5–5.1)
PROT SERPL-MCNC: 7.8 G/DL (ref 6–8.4)
RBC # BLD AUTO: 3.47 M/UL (ref 4–5.4)
SODIUM SERPL-SCNC: 139 MMOL/L (ref 136–145)
TROPONIN I SERPL DL<=0.01 NG/ML-MCNC: <0.006 NG/ML (ref 0–0.03)
WBC # BLD AUTO: 7.22 K/UL (ref 3.9–12.7)

## 2024-06-23 PROCEDURE — 99285 EMERGENCY DEPT VISIT HI MDM: CPT | Mod: 25

## 2024-06-23 PROCEDURE — 25000003 PHARM REV CODE 250: Performed by: STUDENT IN AN ORGANIZED HEALTH CARE EDUCATION/TRAINING PROGRAM

## 2024-06-23 PROCEDURE — 83880 ASSAY OF NATRIURETIC PEPTIDE: CPT | Performed by: STUDENT IN AN ORGANIZED HEALTH CARE EDUCATION/TRAINING PROGRAM

## 2024-06-23 PROCEDURE — 25000003 PHARM REV CODE 250: Performed by: HOSPITALIST

## 2024-06-23 PROCEDURE — 96374 THER/PROPH/DIAG INJ IV PUSH: CPT

## 2024-06-23 PROCEDURE — 63600175 PHARM REV CODE 636 W HCPCS

## 2024-06-23 PROCEDURE — 80053 COMPREHEN METABOLIC PANEL: CPT | Performed by: STUDENT IN AN ORGANIZED HEALTH CARE EDUCATION/TRAINING PROGRAM

## 2024-06-23 PROCEDURE — G0378 HOSPITAL OBSERVATION PER HR: HCPCS

## 2024-06-23 PROCEDURE — 84484 ASSAY OF TROPONIN QUANT: CPT | Mod: 91 | Performed by: HOSPITALIST

## 2024-06-23 PROCEDURE — 93005 ELECTROCARDIOGRAM TRACING: CPT

## 2024-06-23 PROCEDURE — 63600175 PHARM REV CODE 636 W HCPCS: Performed by: STUDENT IN AN ORGANIZED HEALTH CARE EDUCATION/TRAINING PROGRAM

## 2024-06-23 PROCEDURE — 96361 HYDRATE IV INFUSION ADD-ON: CPT

## 2024-06-23 PROCEDURE — 12002 RPR S/N/AX/GEN/TRNK2.6-7.5CM: CPT

## 2024-06-23 PROCEDURE — 85025 COMPLETE CBC W/AUTO DIFF WBC: CPT | Performed by: STUDENT IN AN ORGANIZED HEALTH CARE EDUCATION/TRAINING PROGRAM

## 2024-06-23 PROCEDURE — 93010 ELECTROCARDIOGRAM REPORT: CPT | Mod: ,,, | Performed by: INTERNAL MEDICINE

## 2024-06-23 PROCEDURE — 0HQ0XZZ REPAIR SCALP SKIN, EXTERNAL APPROACH: ICD-10-PCS | Performed by: STUDENT IN AN ORGANIZED HEALTH CARE EDUCATION/TRAINING PROGRAM

## 2024-06-23 PROCEDURE — 84484 ASSAY OF TROPONIN QUANT: CPT | Performed by: STUDENT IN AN ORGANIZED HEALTH CARE EDUCATION/TRAINING PROGRAM

## 2024-06-23 PROCEDURE — 96375 TX/PRO/DX INJ NEW DRUG ADDON: CPT

## 2024-06-23 RX ORDER — INSULIN ASPART 100 [IU]/ML
0-10 INJECTION, SOLUTION INTRAVENOUS; SUBCUTANEOUS
Status: DISCONTINUED | OUTPATIENT
Start: 2024-06-23 | End: 2024-06-25 | Stop reason: HOSPADM

## 2024-06-23 RX ORDER — SODIUM CHLORIDE 0.9 % (FLUSH) 0.9 %
10 SYRINGE (ML) INJECTION EVERY 8 HOURS PRN
Status: DISCONTINUED | OUTPATIENT
Start: 2024-06-23 | End: 2024-06-25 | Stop reason: HOSPADM

## 2024-06-23 RX ORDER — IBUPROFEN 200 MG
16 TABLET ORAL
Status: DISCONTINUED | OUTPATIENT
Start: 2024-06-23 | End: 2024-06-25 | Stop reason: HOSPADM

## 2024-06-23 RX ORDER — PROCHLORPERAZINE EDISYLATE 5 MG/ML
5 INJECTION INTRAMUSCULAR; INTRAVENOUS EVERY 6 HOURS PRN
Status: DISCONTINUED | OUTPATIENT
Start: 2024-06-23 | End: 2024-06-25 | Stop reason: HOSPADM

## 2024-06-23 RX ORDER — ALUMINUM HYDROXIDE, MAGNESIUM HYDROXIDE, AND SIMETHICONE 1200; 120; 1200 MG/30ML; MG/30ML; MG/30ML
30 SUSPENSION ORAL 4 TIMES DAILY PRN
Status: DISCONTINUED | OUTPATIENT
Start: 2024-06-23 | End: 2024-06-25 | Stop reason: HOSPADM

## 2024-06-23 RX ORDER — SIMETHICONE 80 MG
1 TABLET,CHEWABLE ORAL 4 TIMES DAILY PRN
Status: DISCONTINUED | OUTPATIENT
Start: 2024-06-23 | End: 2024-06-25 | Stop reason: HOSPADM

## 2024-06-23 RX ORDER — GLUCAGON 1 MG
1 KIT INJECTION
Status: DISCONTINUED | OUTPATIENT
Start: 2024-06-23 | End: 2024-06-25 | Stop reason: HOSPADM

## 2024-06-23 RX ORDER — NALOXONE HCL 0.4 MG/ML
0.02 VIAL (ML) INJECTION
Status: DISCONTINUED | OUTPATIENT
Start: 2024-06-23 | End: 2024-06-25 | Stop reason: HOSPADM

## 2024-06-23 RX ORDER — ONDANSETRON HYDROCHLORIDE 2 MG/ML
4 INJECTION, SOLUTION INTRAVENOUS ONCE
Status: COMPLETED | OUTPATIENT
Start: 2024-06-23 | End: 2024-06-23

## 2024-06-23 RX ORDER — TALC
6 POWDER (GRAM) TOPICAL NIGHTLY PRN
Status: DISCONTINUED | OUTPATIENT
Start: 2024-06-23 | End: 2024-06-25 | Stop reason: HOSPADM

## 2024-06-23 RX ORDER — PRAVASTATIN SODIUM 40 MG/1
40 TABLET ORAL DAILY
Status: DISCONTINUED | OUTPATIENT
Start: 2024-06-24 | End: 2024-06-25 | Stop reason: HOSPADM

## 2024-06-23 RX ORDER — METOPROLOL SUCCINATE 50 MG/1
100 TABLET, EXTENDED RELEASE ORAL DAILY
Status: DISCONTINUED | OUTPATIENT
Start: 2024-06-24 | End: 2024-06-24

## 2024-06-23 RX ORDER — LOSARTAN POTASSIUM 25 MG/1
100 TABLET ORAL DAILY
Status: DISCONTINUED | OUTPATIENT
Start: 2024-06-24 | End: 2024-06-24

## 2024-06-23 RX ORDER — IBUPROFEN 200 MG
24 TABLET ORAL
Status: DISCONTINUED | OUTPATIENT
Start: 2024-06-23 | End: 2024-06-25 | Stop reason: HOSPADM

## 2024-06-23 RX ORDER — ACETAMINOPHEN 325 MG/1
650 TABLET ORAL EVERY 6 HOURS PRN
Status: DISCONTINUED | OUTPATIENT
Start: 2024-06-23 | End: 2024-06-25 | Stop reason: HOSPADM

## 2024-06-23 RX ORDER — POLYETHYLENE GLYCOL 3350 17 G/17G
17 POWDER, FOR SOLUTION ORAL DAILY
Status: DISCONTINUED | OUTPATIENT
Start: 2024-06-24 | End: 2024-06-25 | Stop reason: HOSPADM

## 2024-06-23 RX ORDER — ONDANSETRON HYDROCHLORIDE 2 MG/ML
INJECTION, SOLUTION INTRAVENOUS
Status: COMPLETED
Start: 2024-06-23 | End: 2024-06-23

## 2024-06-23 RX ORDER — LORAZEPAM 2 MG/ML
INJECTION INTRAMUSCULAR
Status: DISPENSED
Start: 2024-06-23 | End: 2024-06-24

## 2024-06-23 RX ORDER — LORAZEPAM 2 MG/ML
1 INJECTION INTRAMUSCULAR
Status: COMPLETED | OUTPATIENT
Start: 2024-06-23 | End: 2024-06-23

## 2024-06-23 RX ORDER — ONDANSETRON HYDROCHLORIDE 2 MG/ML
4 INJECTION, SOLUTION INTRAVENOUS EVERY 8 HOURS PRN
Status: DISCONTINUED | OUTPATIENT
Start: 2024-06-23 | End: 2024-06-25 | Stop reason: HOSPADM

## 2024-06-23 RX ORDER — IPRATROPIUM BROMIDE AND ALBUTEROL SULFATE 2.5; .5 MG/3ML; MG/3ML
3 SOLUTION RESPIRATORY (INHALATION) EVERY 4 HOURS PRN
Status: DISCONTINUED | OUTPATIENT
Start: 2024-06-23 | End: 2024-06-25 | Stop reason: HOSPADM

## 2024-06-23 RX ADMIN — ACETAMINOPHEN 650 MG: 325 TABLET ORAL at 08:06

## 2024-06-23 RX ADMIN — Medication 6 MG: at 08:06

## 2024-06-23 RX ADMIN — ONDANSETRON 4 MG: 2 INJECTION INTRAMUSCULAR; INTRAVENOUS at 02:06

## 2024-06-23 RX ADMIN — SODIUM CHLORIDE 500 ML: 9 INJECTION, SOLUTION INTRAVENOUS at 02:06

## 2024-06-23 RX ADMIN — ONDANSETRON HYDROCHLORIDE 4 MG: 2 INJECTION, SOLUTION INTRAVENOUS at 02:06

## 2024-06-23 RX ADMIN — LORAZEPAM 1 MG: 2 INJECTION INTRAMUSCULAR; INTRAVENOUS at 03:06

## 2024-06-23 NOTE — ED TRIAGE NOTES
Pt arrives to ED with c/o loc. Reports getting dizzy and having syncopal episode. Reports falling and getting head on floor. Reports lac to back of head. Per ems bleeding controlled and wrapped with kurlex. Pt with c-collar in place. Reports HA and N/V. Reports being on ASA daily. NAD.

## 2024-06-23 NOTE — ED PROVIDER NOTES
Encounter Date: 6/23/2024       History     Chief Complaint   Patient presents with    Loss of Consciousness     84 yo fem to triage via EMS for syncopal episode w/ head injury.Pt has a lac to the back of her head, takes ASA daily. Pt is currently in C-Collar, and bleeding controlled. VSS, NAD, AAOx4    Laceration     83-year-old female who presents via EMS for syncopal episode.  She said that she had been feeling weak all morning and when she stood up in the kitchen, she was closed in the refrigerator door when she experienced syncope.  No preceding chest pain no shortness a breath.  The patient had told EMS that she was lightheaded and dizzy just prior however she tells this writer it was abrupt.  No seizure-like activity.  She did return to consciousness in about 5-10 seconds and was at her baseline.  She does take a baby aspirin every day.  At this time, she is nauseous and has vomited.  Outside of the aforementioned as well as a headache, she does not have any complaints.      Review of patient's allergies indicates:  No Known Allergies  Past Medical History:   Diagnosis Date    Diabetes mellitus     Hypertension      Past Surgical History:   Procedure Laterality Date    HYSTERECTOMY      OOPHORECTOMY       Family History   Problem Relation Name Age of Onset    Breast cancer Sister       Social History     Tobacco Use    Smoking status: Never    Smokeless tobacco: Never     Review of Systems    Physical Exam     Initial Vitals [06/23/24 1351]   BP Pulse Resp Temp SpO2   (!) 157/65 73 17 98.2 °F (36.8 °C) 96 %      MAP       --         Physical Exam    Nursing note and vitals reviewed.  Constitutional:   Older appearing female, actively vomiting   HENT:   Laceration to the posterior occiput, no arterial Pumper   Eyes: EOM are normal. Pupils are equal, round, and reactive to light.   Neck: Neck supple. No JVD present.   Normal range of motion.  Cardiovascular:  Normal rate and regular rhythm.            Pulmonary/Chest: Breath sounds normal. No stridor. No respiratory distress.   Abdominal: Abdomen is soft. There is no abdominal tenderness.   Musculoskeletal:         General: No edema. Normal range of motion.      Cervical back: Normal range of motion and neck supple.      Comments: Midline C-spine tenderness with palpation     Neurological: She is alert and oriented to person, place, and time. GCS score is 15. GCS eye subscore is 4. GCS verbal subscore is 5. GCS motor subscore is 6.   Skin: Skin is warm and dry. Capillary refill takes less than 2 seconds.   Psychiatric: She has a normal mood and affect. Thought content normal.         ED Course   Lac Repair    Date/Time: 6/23/2024 5:18 PM    Performed by: Mohinder Arellano MD  Authorized by: Toan Briceno III, MD    Consent:     Consent obtained:  Verbal  Laceration details:     Location:  Scalp    Scalp location:  Occipital    Length (cm):  4    Depth (mm):  2  Pre-procedure details:     Preparation:  Patient was prepped and draped in usual sterile fashion and imaging obtained to evaluate for foreign bodies  Treatment:     Irrigation solution:  Sterile water    Irrigation method:  Pressure wash  Skin repair:     Repair method:  Staples    Number of staples:  10  Approximation:     Approximation:  Close  Repair type:     Repair type:  Simple  Post-procedure details:     Dressing:  Bulky dressing    Procedure completion:  Tolerated well, no immediate complications    Labs Reviewed - No data to display       Imaging Results    None          Medications   ondansetron injection 4 mg (has no administration in time range)   sodium chloride 0.9% bolus 500 mL 500 mL (has no administration in time range)     Medical Decision Making  Hemodynamically stable. Afebrile. Phonating and protecting the airway spontaneously. No clinical evidence for cardiovascular instability or impending airway compromise. Examination as above. Additional historians include EMS. Prior medical  records reviewed.  Per ED course. Current co-morbidities considered that will impact clinical decision making include as above.    Plan:  Cardiac labs, CT scan of the head and neck, plan for admission.      Amount and/or Complexity of Data Reviewed  Labs: ordered.  Radiology: ordered.    Risk  Prescription drug management.               ED Course as of 06/23/24 1718   Sun Jun 23, 2024   1355 Recent cardiology note reviewed. Hx of non-obstructive CAD, had been complaining of feeling weak as well as CP previously. Planned for repeat lexiscan.  [BG]   1509 Labs reviewed.  CBC negative.  CMP negative.  Cardiac enzymes negative.  CT head reviewed, chest x-ray reviewed.  CT cervical spine reviewed.  MRI thoracic spine ordered.  Attempted to perform laceration repair however patient became dizzy.  Will provide a mg of Ativan for for antiemetic use in addition to for mild anxiolysis. [BG]   1601 Posterior occiput laceration repaired by me. [BG]      ED Course User Index  [BG] Mohinder Arellano MD             Due to history and presentation, will admit for observation.              Clinical Impression:  Final diagnoses:  [R06.02] Shortness of breath                 Mohinder Arellano MD  06/23/24 1718

## 2024-06-23 NOTE — HPI
83 y.o. female with DM2 presents with a complaint of syncope.  Acute onset this morning, after standing from a seated position, associated with lightheadedness and fatigue as well as nausea and vomiting.  Hit head on floor, lac to posterior scalp.  Denies fever, chills, cough, SOB, chest pain, palpitations, diarrhea, or abdominal pain.  In the ED, CT cervical spine shows evidence of T2 compression fracture of uncertain chronicity.  MRI thoracic spice shows only stable chronic changes to the T2 T3 endplates. Otherwise unremarkable for acute abnormality.  Placed in observation.

## 2024-06-23 NOTE — ADMISSIONCARE
AdmissionCare    Guideline: Syncope - OBS, Observation    Based on the indications selected for the patient, the bed status of Observation was determined to be MET    The following indications were selected as present at the time of evaluation of the patient:      - Observation Care Admission Criteria            - Observation care is indicated for 1 or more of the following:     - Possible etiology that would require care, testing, or monitoring beyond emergency department care (eg, GI bleed, stroke, aortic dissection)    AdmissionCare documentation entered by: Santa Shah    [facility], 28th edition, Copyright © 2024 [facility], [facility] All Rights Reserved.  [id number]

## 2024-06-24 PROBLEM — J96.01 ACUTE RESPIRATORY FAILURE WITH HYPOXIA: Status: ACTIVE | Noted: 2024-06-24

## 2024-06-24 PROBLEM — E78.5 HLD (HYPERLIPIDEMIA): Status: ACTIVE | Noted: 2024-06-24

## 2024-06-24 PROBLEM — D64.9 ANEMIA: Status: ACTIVE | Noted: 2024-06-24

## 2024-06-24 PROBLEM — R93.7 ABNORMAL CT SCAN, CERVICAL SPINE: Status: ACTIVE | Noted: 2024-06-24

## 2024-06-24 PROBLEM — S01.91XA LACERATION OF HEAD: Status: ACTIVE | Noted: 2024-06-24

## 2024-06-24 PROBLEM — I10 HYPERTENSION: Status: ACTIVE | Noted: 2024-06-24

## 2024-06-24 PROBLEM — E83.39 HYPERPHOSPHATEMIA: Status: ACTIVE | Noted: 2024-06-24

## 2024-06-24 PROBLEM — E83.42 HYPOMAGNESEMIA: Status: ACTIVE | Noted: 2024-06-24

## 2024-06-24 PROBLEM — I25.10 CORONARY ARTERY DISEASE INVOLVING NATIVE CORONARY ARTERY OF NATIVE HEART WITHOUT ANGINA PECTORIS: Status: ACTIVE | Noted: 2024-06-24

## 2024-06-24 LAB
ALBUMIN SERPL BCP-MCNC: 3.3 G/DL (ref 3.5–5.2)
ALP SERPL-CCNC: 37 U/L (ref 55–135)
ALT SERPL W/O P-5'-P-CCNC: 7 U/L (ref 10–44)
ANION GAP SERPL CALC-SCNC: 13 MMOL/L (ref 8–16)
ASCENDING AORTA: 2.84 CM
AST SERPL-CCNC: 11 U/L (ref 10–40)
AV INDEX (PROSTH): 0.58
AV MEAN GRADIENT: 6 MMHG
AV PEAK GRADIENT: 11 MMHG
AV VALVE AREA BY VELOCITY RATIO: 1.62 CM²
AV VALVE AREA: 1.75 CM²
AV VELOCITY RATIO: 0.54
BASOPHILS # BLD AUTO: 0.03 K/UL (ref 0–0.2)
BASOPHILS NFR BLD: 0.5 % (ref 0–1.9)
BILIRUB SERPL-MCNC: 0.4 MG/DL (ref 0.1–1)
BSA FOR ECHO PROCEDURE: 1.67 M2
BUN SERPL-MCNC: 20 MG/DL (ref 8–23)
CALCIUM SERPL-MCNC: 8.4 MG/DL (ref 8.7–10.5)
CHLORIDE SERPL-SCNC: 103 MMOL/L (ref 95–110)
CO2 SERPL-SCNC: 25 MMOL/L (ref 23–29)
CREAT SERPL-MCNC: 0.8 MG/DL (ref 0.5–1.4)
CV ECHO LV RWT: 0.43 CM
DIFFERENTIAL METHOD BLD: ABNORMAL
DOP CALC AO PEAK VEL: 1.64 M/S
DOP CALC AO VTI: 43 CM
DOP CALC LVOT AREA: 3 CM2
DOP CALC LVOT DIAMETER: 1.96 CM
DOP CALC LVOT PEAK VEL: 0.88 M/S
DOP CALC LVOT STROKE VOLUME: 75.09 CM3
DOP CALCLVOT PEAK VEL VTI: 24.9 CM
E WAVE DECELERATION TIME: 209.54 MSEC
E/A RATIO: 0.9
E/E' RATIO: 25.33 M/S
ECHO LV POSTERIOR WALL: 0.93 CM (ref 0.6–1.1)
EOSINOPHIL # BLD AUTO: 0.1 K/UL (ref 0–0.5)
EOSINOPHIL NFR BLD: 2.1 % (ref 0–8)
ERYTHROCYTE [DISTWIDTH] IN BLOOD BY AUTOMATED COUNT: 13.8 % (ref 11.5–14.5)
EST. GFR  (NO RACE VARIABLE): >60 ML/MIN/1.73 M^2
FERRITIN SERPL-MCNC: 19 NG/ML (ref 20–300)
FRACTIONAL SHORTENING: 37 % (ref 28–44)
GLUCOSE SERPL-MCNC: 68 MG/DL (ref 70–110)
HCT VFR BLD AUTO: 29.4 % (ref 37–48.5)
HGB BLD-MCNC: 9.6 G/DL (ref 12–16)
IMM GRANULOCYTES # BLD AUTO: 0.01 K/UL (ref 0–0.04)
IMM GRANULOCYTES NFR BLD AUTO: 0.2 % (ref 0–0.5)
INTERVENTRICULAR SEPTUM: 1.04 CM (ref 0.6–1.1)
IRON SERPL-MCNC: 81 UG/DL (ref 30–160)
IVC DIAMETER: 0.96 CM
LA MAJOR: 4.23 CM
LA MINOR: 4 CM
LA WIDTH: 2.9 CM
LEFT ATRIUM SIZE: 3.3 CM
LEFT ATRIUM VOLUME INDEX: 20.1 ML/M2
LEFT ATRIUM VOLUME: 33.45 CM3
LEFT INTERNAL DIMENSION IN SYSTOLE: 2.71 CM (ref 2.1–4)
LEFT VENTRICLE DIASTOLIC VOLUME INDEX: 49.96 ML/M2
LEFT VENTRICLE DIASTOLIC VOLUME: 82.93 ML
LEFT VENTRICLE MASS INDEX: 84 G/M2
LEFT VENTRICLE SYSTOLIC VOLUME INDEX: 16.5 ML/M2
LEFT VENTRICLE SYSTOLIC VOLUME: 27.33 ML
LEFT VENTRICULAR INTERNAL DIMENSION IN DIASTOLE: 4.3 CM (ref 3.5–6)
LEFT VENTRICULAR MASS: 139.53 G
LV LATERAL E/E' RATIO: 22.8 M/S
LV SEPTAL E/E' RATIO: 28.5 M/S
LVED V (TEICH): 82.93 ML
LVES V (TEICH): 27.33 ML
LVOT MG: 1.9 MMHG
LVOT MV: 0.66 CM/S
LYMPHOCYTES # BLD AUTO: 1.7 K/UL (ref 1–4.8)
LYMPHOCYTES NFR BLD: 27.9 % (ref 18–48)
MAGNESIUM SERPL-MCNC: 1 MG/DL (ref 1.6–2.6)
MAGNESIUM SERPL-MCNC: 2.3 MG/DL (ref 1.6–2.6)
MCH RBC QN AUTO: 28.8 PG (ref 27–31)
MCHC RBC AUTO-ENTMCNC: 32.7 G/DL (ref 32–36)
MCV RBC AUTO: 88 FL (ref 82–98)
MONOCYTES # BLD AUTO: 0.8 K/UL (ref 0.3–1)
MONOCYTES NFR BLD: 13 % (ref 4–15)
MV PEAK A VEL: 1.26 M/S
MV PEAK E VEL: 1.14 M/S
MV STENOSIS PRESSURE HALF TIME: 60.77 MS
MV VALVE AREA P 1/2 METHOD: 3.62 CM2
NEUTROPHILS # BLD AUTO: 3.4 K/UL (ref 1.8–7.7)
NEUTROPHILS NFR BLD: 56.3 % (ref 38–73)
NRBC BLD-RTO: 0 /100 WBC
OHS CV RV/LV RATIO: 0.55 CM
PHOSPHATE SERPL-MCNC: 5.6 MG/DL (ref 2.7–4.5)
PISA TR MAX VEL: 1.13 M/S
PLATELET # BLD AUTO: 221 K/UL (ref 150–450)
PMV BLD AUTO: 10.5 FL (ref 9.2–12.9)
POCT GLUCOSE: 104 MG/DL (ref 70–110)
POCT GLUCOSE: 197 MG/DL (ref 70–110)
POCT GLUCOSE: 231 MG/DL (ref 70–110)
POCT GLUCOSE: 86 MG/DL (ref 70–110)
POTASSIUM SERPL-SCNC: 3.6 MMOL/L (ref 3.5–5.1)
PROT SERPL-MCNC: 6.8 G/DL (ref 6–8.4)
PULM VEIN S/D RATIO: 1.62
PV PEAK D VEL: 0.39 M/S
PV PEAK GRADIENT: 4 MMHG
PV PEAK S VEL: 0.63 M/S
PV PEAK VELOCITY: 0.99 M/S
RA MAJOR: 3.48 CM
RA PRESSURE ESTIMATED: 3 MMHG
RA WIDTH: 2.8 CM
RBC # BLD AUTO: 3.33 M/UL (ref 4–5.4)
RIGHT VENTRICLE DIASTOLIC BASEL DIMENSION: 2.4 CM
RIGHT VENTRICULAR END-DIASTOLIC DIMENSION: 2.37 CM
RV TB RVSP: 4 MMHG
RV TISSUE DOPPLER FREE WALL SYSTOLIC VELOCITY 1 (APICAL 4 CHAMBER VIEW): 9.21 CM/S
SATURATED IRON: 17 % (ref 20–50)
SINUS: 2.6 CM
SODIUM SERPL-SCNC: 141 MMOL/L (ref 136–145)
STJ: 2.48 CM
TDI LATERAL: 0.05 M/S
TDI SEPTAL: 0.04 M/S
TDI: 0.05 M/S
TOTAL IRON BINDING CAPACITY: 490 UG/DL (ref 250–450)
TR MAX PG: 5 MMHG
TRANSFERRIN SERPL-MCNC: 331 MG/DL (ref 200–375)
TRICUSPID ANNULAR PLANE SYSTOLIC EXCURSION: 1.92 CM
TV REST PULMONARY ARTERY PRESSURE: 8 MMHG
WBC # BLD AUTO: 6.06 K/UL (ref 3.9–12.7)
Z-SCORE OF LEFT VENTRICULAR DIMENSION IN END DIASTOLE: -0.78
Z-SCORE OF LEFT VENTRICULAR DIMENSION IN END SYSTOLE: -0.49

## 2024-06-24 PROCEDURE — 63600175 PHARM REV CODE 636 W HCPCS: Performed by: NURSE PRACTITIONER

## 2024-06-24 PROCEDURE — 83735 ASSAY OF MAGNESIUM: CPT | Mod: 91 | Performed by: NURSE PRACTITIONER

## 2024-06-24 PROCEDURE — 80053 COMPREHEN METABOLIC PANEL: CPT | Performed by: HOSPITALIST

## 2024-06-24 PROCEDURE — 99223 1ST HOSP IP/OBS HIGH 75: CPT | Mod: 25,,, | Performed by: INTERNAL MEDICINE

## 2024-06-24 PROCEDURE — 82607 VITAMIN B-12: CPT | Performed by: NURSE PRACTITIONER

## 2024-06-24 PROCEDURE — 82746 ASSAY OF FOLIC ACID SERUM: CPT | Performed by: NURSE PRACTITIONER

## 2024-06-24 PROCEDURE — 36415 COLL VENOUS BLD VENIPUNCTURE: CPT | Performed by: HOSPITALIST

## 2024-06-24 PROCEDURE — 25000003 PHARM REV CODE 250: Performed by: HOSPITALIST

## 2024-06-24 PROCEDURE — 82728 ASSAY OF FERRITIN: CPT | Performed by: NURSE PRACTITIONER

## 2024-06-24 PROCEDURE — 83540 ASSAY OF IRON: CPT | Performed by: NURSE PRACTITIONER

## 2024-06-24 PROCEDURE — 83735 ASSAY OF MAGNESIUM: CPT | Performed by: HOSPITALIST

## 2024-06-24 PROCEDURE — 94761 N-INVAS EAR/PLS OXIMETRY MLT: CPT

## 2024-06-24 PROCEDURE — 25000003 PHARM REV CODE 250: Performed by: NURSE PRACTITIONER

## 2024-06-24 PROCEDURE — 84100 ASSAY OF PHOSPHORUS: CPT | Performed by: HOSPITALIST

## 2024-06-24 PROCEDURE — 85025 COMPLETE CBC W/AUTO DIFF WBC: CPT | Performed by: HOSPITALIST

## 2024-06-24 PROCEDURE — 84466 ASSAY OF TRANSFERRIN: CPT | Performed by: NURSE PRACTITIONER

## 2024-06-24 PROCEDURE — 21400001 HC TELEMETRY ROOM

## 2024-06-24 PROCEDURE — 36415 COLL VENOUS BLD VENIPUNCTURE: CPT | Performed by: NURSE PRACTITIONER

## 2024-06-24 PROCEDURE — 27000221 HC OXYGEN, UP TO 24 HOURS

## 2024-06-24 RX ORDER — SODIUM CHLORIDE 9 MG/ML
INJECTION, SOLUTION INTRAVENOUS CONTINUOUS
Status: DISCONTINUED | OUTPATIENT
Start: 2024-06-24 | End: 2024-06-25 | Stop reason: HOSPADM

## 2024-06-24 RX ORDER — MAGNESIUM SULFATE HEPTAHYDRATE 40 MG/ML
4 INJECTION, SOLUTION INTRAVENOUS ONCE
Status: DISCONTINUED | OUTPATIENT
Start: 2024-06-24 | End: 2024-06-24

## 2024-06-24 RX ORDER — MAGNESIUM SULFATE HEPTAHYDRATE 40 MG/ML
2 INJECTION, SOLUTION INTRAVENOUS
Status: COMPLETED | OUTPATIENT
Start: 2024-06-24 | End: 2024-06-24

## 2024-06-24 RX ADMIN — SODIUM CHLORIDE: 9 INJECTION, SOLUTION INTRAVENOUS at 12:06

## 2024-06-24 RX ADMIN — ACETAMINOPHEN 650 MG: 325 TABLET ORAL at 11:06

## 2024-06-24 RX ADMIN — ACETAMINOPHEN 650 MG: 325 TABLET ORAL at 01:06

## 2024-06-24 RX ADMIN — PRAVASTATIN SODIUM 40 MG: 40 TABLET ORAL at 08:06

## 2024-06-24 RX ADMIN — Medication 6 MG: at 11:06

## 2024-06-24 RX ADMIN — MAGNESIUM SULFATE HEPTAHYDRATE 2 G: 40 INJECTION, SOLUTION INTRAVENOUS at 10:06

## 2024-06-24 RX ADMIN — SODIUM CHLORIDE: 9 INJECTION, SOLUTION INTRAVENOUS at 08:06

## 2024-06-24 RX ADMIN — METOPROLOL SUCCINATE 100 MG: 50 TABLET, EXTENDED RELEASE ORAL at 08:06

## 2024-06-24 RX ADMIN — MAGNESIUM SULFATE HEPTAHYDRATE 2 G: 40 INJECTION, SOLUTION INTRAVENOUS at 12:06

## 2024-06-24 RX ADMIN — LOSARTAN POTASSIUM 100 MG: 25 TABLET, FILM COATED ORAL at 08:06

## 2024-06-24 NOTE — ASSESSMENT & PLAN NOTE
Posterior occiput laceration with staples intact   Neuro checks  CT head: right parietal scalp laceration.  No evidence of a calvarial fracture or acute intracranial process.Changes of chronic small vessel ischemic disease and cerebral volume loss. Left preorbital soft tissue swelling.

## 2024-06-24 NOTE — ASSESSMENT & PLAN NOTE
"Patient's FSGs are controlled on current medication regimen.  Last A1c reviewed-   Lab Results   Component Value Date    HGBA1C 8.9 (H) 05/03/2011     Most recent fingerstick glucose reviewed- No results for input(s): "POCTGLUCOSE" in the last 24 hours.  Current correctional scale  Medium  Maintain anti-hyperglycemic dose as follows-   Antihyperglycemics (From admission, onward)      Start     Stop Route Frequency Ordered    06/23/24 1823  insulin aspart U-100 pen 0-10 Units         -- SubQ Before meals & nightly PRN 06/23/24 1727          Hold Oral hypoglycemics while patient is in the hospital.  "

## 2024-06-24 NOTE — PROGRESS NOTES
Consult received for hyperphosphatemia.  Phos 5.6. No prior phos levels available for comparison. GFR normal.   Agree with IVF. Ordered PTH and vitamin D levels. Repeat phos level in AM. Will expand workup if hyperphosphatemia worsens.  Do not think this is contributing to her syncope.     Full consult note to follow.      Emilee Wilson MD  [facility]  [phone]

## 2024-06-24 NOTE — ASSESSMENT & PLAN NOTE
Patient has Abnormal Magnesium: hypomagnesemia. Will continue to monitor electrolytes closely. Will replace the affected electrolytes and repeat labs to be done after interventions completed. The patient's magnesium results have been reviewed and are listed below.  Recent Labs   Lab 06/24/24  0514   MG 1.0*

## 2024-06-24 NOTE — HOSPITAL COURSE
Admitted for syncope. CT cervical spine: Age-indeterminate compression deformity of the T2 vertebral body.  MRI of the thoracic spine may be attempted for further evaluation.No evidence of acute fracture or traumatic malalignment of the cervical spine.Calcification of the ligamentum flavum at the C4-C5 and C5-C6 level resulting in severe narrowing of the spinal canal at these levels. Significant retrodental pannus formation resulting in narrowing at the cervicomedullary junction. Extensive calcifications in the neck vessels. MRI thoracic spine: Minimal chronic deformities involving the superior endplate of the T2 and T3 vertebral bodies, in keeping with chronic compression deformities. No acute fracture or traumatic malalignment of the thoracic spine. Consulted cards. Unable to get orthostatic BP. Patient refuses to get up due to severe dizziness. Echo  show pressed EF. PT/OT eval. Mg replace as needed. Requiring 2L NC secondary to desat 89% on RA. Wean as tolerated. Posterior occiput laceration with staples intact. Neuro checks. Monitor phosphorus due to elevated. Anemia added iron studies. No evidence of bleeding.   Patients symptoms much improved,syncope likely duo to orthostatic hypotension and dehydration,patient  and family member was educated,patient ddi well with PT,OT.  Patient was discharged home with HH and DME and follow up with PCP and cardiology as out patient.

## 2024-06-24 NOTE — ASSESSMENT & PLAN NOTE
Consulted Cards   Unable to get orthostatic due to dizziness. Patient refuses  Echo pending   Tele monitoring  PT/OT eval.   Neuro checks

## 2024-06-24 NOTE — ASSESSMENT & PLAN NOTE
Patient's FSGs are controlled on current medication regimen.  Last A1c reviewed-   Lab Results   Component Value Date    HGBA1C 8.9 (H) 05/03/2011     Most recent fingerstick glucose reviewed-   Recent Labs   Lab 06/23/24 2009 06/24/24  0658   POCTGLUCOSE 83 86     Current correctional scale  Medium  Maintain anti-hyperglycemic dose as follows-   Antihyperglycemics (From admission, onward)    Start     Stop Route Frequency Ordered    06/23/24 1823  insulin aspart U-100 pen 0-10 Units         -- SubQ Before meals & nightly PRN 06/23/24 1727        Hold Oral hypoglycemics while patient is in the hospital.

## 2024-06-24 NOTE — H&P
[facility] Medicine  History & Physical    Patient Name: Marya Carranza  MRN: [id number]  Patient Class: OP- Observation  Admission Date: 6/23/2024  Attending Physician: Toan Briceno III, MD   Primary Care Provider: Phillip Levy MD         Patient information was obtained from patient, past medical records, and ER records.     Subjective:     Principal Problem:Syncope    Chief Complaint:   Chief Complaint   Patient presents with    Loss of Consciousness     82 yo fem to triage via EMS for syncopal episode w/ head injury.Pt has a lac to the back of her head, takes ASA daily. Pt is currently in C-Collar, and bleeding controlled. VSS, NAD, AAOx4    Laceration        HPI: 83 y.o. female with DM2 presents with a complaint of syncope.  Acute onset this morning, after standing from a seated position, associated with lightheadedness and fatigue as well as nausea and vomiting.  Hit head on floor, lac to posterior scalp.  Denies fever, chills, cough, SOB, chest pain, palpitations, diarrhea, or abdominal pain.  In the ED, CT cervical spine shows evidence of T2 compression fracture of uncertain chronicity.  MRI thoracic spice shows only stable chronic changes to the T2 T3 endplates. Otherwise unremarkable for acute abnormality.  Placed in observation.    Past Medical History:   Diagnosis Date    Diabetes mellitus     Hypertension        Past Surgical History:   Procedure Laterality Date    HYSTERECTOMY      OOPHORECTOMY         Review of patient's allergies indicates:  No Known Allergies    No current facility-administered medications on file prior to encounter.     Current Outpatient Medications on File Prior to Encounter   Medication Sig    baclofen (LIORESAL) 10 MG tablet Take 0.5 tablets (5 mg total) by mouth 3 (three) times daily.    glipiZIDE (GLUCOTROL) 10 MG tablet Take 10 mg by mouth 2 (two) times daily.    losartan-hydrochlorothiazide 100-25 mg (HYZAAR) 100-25 mg per tablet Take 1 tablet  by mouth once daily.    metFORMIN (GLUCOPHAGE) 1000 MG tablet Take 1,000 mg by mouth 2 (two) times daily with meals.    metoprolol succinate (TOPROL-XL) 100 MG 24 hr tablet Take 100 mg by mouth once daily.    metoprolol succinate (TOPROL-XL) 50 MG 24 hr tablet Take 1 tablet (50 mg total) by mouth once daily.    omeprazole (PRILOSEC) 20 MG capsule Take 20 mg by mouth 2 (two) times daily.    pravastatin (PRAVACHOL) 40 MG tablet Take 40 mg by mouth once daily.     Family History       Problem Relation (Age of Onset)    Breast cancer Sister          Tobacco Use    Smoking status: Never    Smokeless tobacco: Never   Substance and Sexual Activity    Alcohol use: Not on file    Drug use: Not on file    Sexual activity: Not on file     Review of Systems   Constitutional:  Positive for fatigue. Negative for chills and fever.   HENT:  Negative for congestion and rhinorrhea.    Eyes:  Negative for photophobia and visual disturbance.   Respiratory:  Negative for cough and shortness of breath.    Cardiovascular:  Negative for chest pain, palpitations and leg swelling.   Gastrointestinal:  Positive for nausea and vomiting. Negative for abdominal pain and diarrhea.   Genitourinary:  Negative for dysuria, frequency and urgency.   Skin:  Negative for pallor, rash and wound.   Neurological:  Positive for dizziness, syncope and light-headedness. Negative for headaches.   Psychiatric/Behavioral:  Negative for confusion and decreased concentration.      Objective:     Vital Signs (Most Recent):  Temp: 97.4 °F (36.3 °C) (06/23/24 1845)  Pulse: 81 (06/23/24 1845)  Resp: 18 (06/23/24 1845)  BP: (!) 191/78 (06/23/24 1845)  SpO2: 98 % (06/23/24 1845) Vital Signs (24h Range):  Temp:  [97.4 °F (36.3 °C)-98.2 °F (36.8 °C)] 97.4 °F (36.3 °C)  Pulse:  [73-82] 81  Resp:  [15-20] 18  SpO2:  [87 %-100 %] 98 %  BP: (142-191)/() 191/78        There is no height or weight on file to calculate BMI.     Physical Exam  Vitals and nursing note  "reviewed.   Constitutional:       General: She is not in acute distress.     Appearance: She is well-developed.   HENT:      Head: Normocephalic.      Right Ear: External ear normal.      Left Ear: External ear normal.      Nose: Nose normal.   Eyes:      Conjunctiva/sclera: Conjunctivae normal.   Cardiovascular:      Rate and Rhythm: Normal rate and regular rhythm.   Pulmonary:      Effort: Pulmonary effort is normal. No respiratory distress.   Abdominal:      General: There is no distension.      Palpations: Abdomen is soft.   Skin:     General: Skin is warm and dry.      Findings: Wound present.   Neurological:      Mental Status: She is alert and oriented to person, place, and time.   Psychiatric:         Thought Content: Thought content normal.                Significant Labs: All pertinent labs within the past 24 hours have been reviewed.    Significant Imaging: I have reviewed all pertinent imaging results/findings within the past 24 hours.  Assessment/Plan:     * Syncope  Description indicates suspected orthostatic hypotension or vagal response.  Workup thus far unremarkable for acute abnormality.  Monitor on tele, obtain serial troponin, check echo and carotid US.  Repeat orthostatics in am.  NPO p MN.    Type 2 diabetes mellitus  Patient's FSGs are controlled on current medication regimen.  Last A1c reviewed-   Lab Results   Component Value Date    HGBA1C 8.9 (H) 05/03/2011     Most recent fingerstick glucose reviewed- No results for input(s): "POCTGLUCOSE" in the last 24 hours.  Current correctional scale  Medium  Maintain anti-hyperglycemic dose as follows-   Antihyperglycemics (From admission, onward)      Start     Stop Route Frequency Ordered    06/23/24 1823  insulin aspart U-100 pen 0-10 Units         -- SubQ Before meals & nightly PRN 06/23/24 1727          Hold Oral hypoglycemics while patient is in the hospital.      VTE Risk Mitigation (From admission, onward)           Ordered     Reason for No " Pharmacological VTE Prophylaxis  Once        Question:  Reasons:  Answer:  Risk of Bleeding    06/23/24 1727     IP VTE HIGH RISK PATIENT  Once         06/23/24 1727     Place sequential compression device  Until discontinued         06/23/24 1727                         On 06/23/2024, patient should be placed in hospital observation services under my care in collaboration with Toan Briceno MD.      AdmissionCare    Guideline: Syncope - OBS, Observation    Based on the indications selected for the patient, the bed status of Observation was determined to be MET    The following indications were selected as present at the time of evaluation of the patient:      - Observation Care Admission Criteria            - Observation care is indicated for 1 or more of the following:     - Possible etiology that would require care, testing, or monitoring beyond emergency department care (eg, GI bleed, stroke, aortic dissection)    AdmissionCare documentation entered by: Santa Shah    [facility] [employer], 28th edition, Copyright © 2024 [facility] [employer], [facility] All Rights Reserved.  [id number]    Narayan Mtz Jr., APRN, AGACNP-BC  Hospitalist - Department of Hospital Medicine  [facility] [address] [facility] [place] [address]  Office #: [phone]; Pager #: [phone]

## 2024-06-24 NOTE — PLAN OF CARE
Problem: Adult Inpatient Plan of Care  Goal: Plan of Care Review  Outcome: Progressing  Goal: Patient-Specific Goal (Individualized)  Outcome: Progressing  Goal: Absence of Hospital-Acquired Illness or Injury  Outcome: Progressing  Goal: Optimal Comfort and Wellbeing  Outcome: Progressing  Goal: Readiness for Transition of Care  Outcome: Progressing     Problem: Infection  Goal: Absence of Infection Signs and Symptoms  Outcome: Progressing     Problem: Diabetes Comorbidity  Goal: Blood Glucose Level Within Targeted Range  Outcome: Progressing     Problem: Fall Injury Risk  Goal: Absence of Fall and Fall-Related Injury  Outcome: Progressing     Problem: Skin Injury Risk Increased  Goal: Skin Health and Integrity  Outcome: Progressing

## 2024-06-24 NOTE — NURSING
Pt resting in bed, IV flushed and saline locked.  Diet ordered, pt given food. VSS.  Pt reports pain to right leg, PRN tylenol given for pain.  Staples to back of head noted, scant amount of bleeding observed.  BG monitored.  No acute distress noted, pt free from falls or injury.  Purewick and brief in place.  Bed in low position, wheels locked, bed alarm on, call light in reach for assistance, will continue to monitor.    [facility]  Nurses Note -- 4 Eyes      6/23/2024       Skin assessed on: Admit      [x] No Pressure Injuries Present    [x]Prevention Measures Documented    [] Yes LDA  for Pressure Injury Previously documented     [] Yes New Pressure Injury Discovered   [] LDA for New Pressure Injury Added      Attending RN:  Sally Sepulveda RN     Second RN:  BITA Mendoza    +

## 2024-06-24 NOTE — ASSESSMENT & PLAN NOTE
No evidence of bleeding  Add iron studies   Current CBC reviewed-   Lab Results   Component Value Date    HGB 9.6 (L) 06/24/2024    HCT 29.4 (L) 06/24/2024     Monitor serial CBC and transfuse if patient becomes hemodynamically unstable, symptomatic or H/H drops below 7/21.

## 2024-06-24 NOTE — ASSESSMENT & PLAN NOTE
CT cervical spine: Age-indeterminate compression deformity of the T2 vertebral body.  MRI of the thoracic spine may be attempted for further evaluation.No evidence of acute fracture or traumatic malalignment of the cervical spine.Calcification of the ligamentum flavum at the C4-C5 and C5-C6 level resulting in severe narrowing of the spinal canal at these levels. Significant retrodental pannus formation resulting in narrowing at the cervicomedullary junction. Extensive calcifications in the neck vessels.  MRI thoracic spine: Minimal chronic deformities involving the superior endplate of the T2 and T3 vertebral bodies, in keeping with chronic compression deformities. No acute fracture or traumatic malalignment of the thoracic spine.

## 2024-06-24 NOTE — SUBJECTIVE & OBJECTIVE
Past Medical History:   Diagnosis Date    Diabetes mellitus     Hypertension        Past Surgical History:   Procedure Laterality Date    HYSTERECTOMY      OOPHORECTOMY         Review of patient's allergies indicates:  No Known Allergies    No current facility-administered medications on file prior to encounter.     Current Outpatient Medications on File Prior to Encounter   Medication Sig    baclofen (LIORESAL) 10 MG tablet Take 0.5 tablets (5 mg total) by mouth 3 (three) times daily.    glipiZIDE (GLUCOTROL) 10 MG tablet Take 10 mg by mouth 2 (two) times daily.    losartan-hydrochlorothiazide 100-25 mg (HYZAAR) 100-25 mg per tablet Take 1 tablet by mouth once daily.    metFORMIN (GLUCOPHAGE) 1000 MG tablet Take 1,000 mg by mouth 2 (two) times daily with meals.    metoprolol succinate (TOPROL-XL) 100 MG 24 hr tablet Take 100 mg by mouth once daily.    metoprolol succinate (TOPROL-XL) 50 MG 24 hr tablet Take 1 tablet (50 mg total) by mouth once daily.    omeprazole (PRILOSEC) 20 MG capsule Take 20 mg by mouth 2 (two) times daily.    pravastatin (PRAVACHOL) 40 MG tablet Take 40 mg by mouth once daily.     Family History       Problem Relation (Age of Onset)    Breast cancer Sister          Tobacco Use    Smoking status: Never    Smokeless tobacco: Never   Substance and Sexual Activity    Alcohol use: Not on file    Drug use: Not on file    Sexual activity: Not on file     Review of Systems   Constitutional:  Positive for fatigue. Negative for chills and fever.   HENT:  Negative for congestion and rhinorrhea.    Eyes:  Negative for photophobia and visual disturbance.   Respiratory:  Negative for cough and shortness of breath.    Cardiovascular:  Negative for chest pain, palpitations and leg swelling.   Gastrointestinal:  Positive for nausea and vomiting. Negative for abdominal pain and diarrhea.   Genitourinary:  Negative for dysuria, frequency and urgency.   Skin:  Negative for pallor, rash and wound.   Neurological:   Positive for dizziness, syncope and light-headedness. Negative for headaches.   Psychiatric/Behavioral:  Negative for confusion and decreased concentration.      Objective:     Vital Signs (Most Recent):  Temp: 97.4 °F (36.3 °C) (06/23/24 1845)  Pulse: 81 (06/23/24 1845)  Resp: 18 (06/23/24 1845)  BP: (!) 191/78 (06/23/24 1845)  SpO2: 98 % (06/23/24 1845) Vital Signs (24h Range):  Temp:  [97.4 °F (36.3 °C)-98.2 °F (36.8 °C)] 97.4 °F (36.3 °C)  Pulse:  [73-82] 81  Resp:  [15-20] 18  SpO2:  [87 %-100 %] 98 %  BP: (142-191)/() 191/78        There is no height or weight on file to calculate BMI.     Physical Exam  Vitals and nursing note reviewed.   Constitutional:       General: She is not in acute distress.     Appearance: She is well-developed.   HENT:      Head: Normocephalic.      Right Ear: External ear normal.      Left Ear: External ear normal.      Nose: Nose normal.   Eyes:      Conjunctiva/sclera: Conjunctivae normal.   Cardiovascular:      Rate and Rhythm: Normal rate and regular rhythm.   Pulmonary:      Effort: Pulmonary effort is normal. No respiratory distress.   Abdominal:      General: There is no distension.      Palpations: Abdomen is soft.   Skin:     General: Skin is warm and dry.      Findings: Wound present.   Neurological:      Mental Status: She is alert and oriented to person, place, and time.   Psychiatric:         Thought Content: Thought content normal.                Significant Labs: All pertinent labs within the past 24 hours have been reviewed.    Significant Imaging: I have reviewed all pertinent imaging results/findings within the past 24 hours.

## 2024-06-24 NOTE — NURSING
Orthostatic VS attempted, pt states she tried to sit up earlier and got really dizzy, pt is afraid she will pass out if she tries to sit up or stand.  Will notify day nurse.

## 2024-06-24 NOTE — ASSESSMENT & PLAN NOTE
Description indicates suspected orthostatic hypotension or vagal response.  Workup thus far unremarkable for acute abnormality.  Monitor on tele, obtain serial troponin, check echo and carotid US.  Repeat orthostatics in am.  NPO p MN.

## 2024-06-24 NOTE — PROGRESS NOTES
[facility] Medicine  Progress Note    Patient Name: Marya Carranza  MRN: [id number]  Patient Class: IP- Inpatient   Admission Date: 6/23/2024  Length of Stay: 0 days  Attending Physician: Toan Briceno III, MD  Primary Care Provider: Phillip Levy MD        Subjective:     Principal Problem:Syncope        HPI:  83 y.o. female with DM2 presents with a complaint of syncope.  Acute onset this morning, after standing from a seated position, associated with lightheadedness and fatigue as well as nausea and vomiting.  Hit head on floor, lac to posterior scalp.  Denies fever, chills, cough, SOB, chest pain, palpitations, diarrhea, or abdominal pain.  In the ED, CT cervical spine shows evidence of T2 compression fracture of uncertain chronicity.  MRI thoracic spice shows only stable chronic changes to the T2 T3 endplates. Otherwise unremarkable for acute abnormality.  Placed in observation.    Overview/Hospital Course:  No notes on file    Interval History: Patient seen and examined. Refuses orthostatic BP. She states she get dizzy even with head of bed elevated. Consulted Cards. PT/OT eval. Mg level 1.0. replace and recheck at 130. Patient currently on 2L NC and states she doesn't require O2 at home, she states she get SOB walking far. Desat yesterday to 89% on RA. Consulted nephrology secondary to elevated phos 5.6. Started IVF.     Review of Systems   Constitutional:  Positive for fatigue. Negative for chills and fever.   HENT:  Negative for congestion and rhinorrhea.    Eyes:  Negative for photophobia and visual disturbance.   Respiratory:  Negative for cough and shortness of breath.    Cardiovascular:  Negative for chest pain, palpitations and leg swelling.   Gastrointestinal:  Positive for nausea and vomiting. Negative for abdominal pain and diarrhea.   Genitourinary:  Negative for dysuria, frequency and urgency.   Skin:  Negative for pallor, rash and wound.   Neurological:  Positive for  dizziness, syncope and light-headedness. Negative for headaches.   Psychiatric/Behavioral:  Negative for confusion and decreased concentration.      Objective:     Vital Signs (Most Recent):  Temp: 97.8 °F (36.6 °C) (06/24/24 0659)  Pulse: 74 (06/24/24 0807)  Resp: 18 (06/24/24 0807)  BP: (!) 140/65 (06/24/24 0659)  SpO2: 97 % (06/24/24 0807) Vital Signs (24h Range):  Temp:  [97.4 °F (36.3 °C)-98.2 °F (36.8 °C)] 97.8 °F (36.6 °C)  Pulse:  [62-82] 74  Resp:  [15-20] 18  SpO2:  [87 %-100 %] 97 %  BP: (129-191)/() 140/65     Weight: 62.1 kg (136 lb 14.5 oz)  Body mass index is 23.5 kg/m².    Intake/Output Summary (Last 24 hours) at 6/24/2024 1049  Last data filed at 6/24/2024 0850  Gross per 24 hour   Intake 1080 ml   Output 700 ml   Net 380 ml         Physical Exam  Vitals and nursing note reviewed.   Constitutional:       General: She is not in acute distress.     Appearance: She is well-developed.   HENT:      Head: Normocephalic.      Right Ear: External ear normal.      Left Ear: External ear normal.      Nose: Nose normal.   Eyes:      Conjunctiva/sclera: Conjunctivae normal.   Cardiovascular:      Rate and Rhythm: Normal rate and regular rhythm.   Pulmonary:      Effort: Pulmonary effort is normal. No respiratory distress.   Abdominal:      General: There is no distension.      Palpations: Abdomen is soft.   Skin:     General: Skin is warm and dry.      Findings: Wound present.      Comments: Staples to back of head    Neurological:      Mental Status: She is alert and oriented to person, place, and time.   Psychiatric:         Thought Content: Thought content normal.             Significant Labs: All pertinent labs within the past 24 hours have been reviewed.    Significant Imaging: I have reviewed all pertinent imaging results/findings within the past 24 hours.    Assessment/Plan:      * Syncope  Consulted Cards   Unable to get orthostatic due to dizziness. Patient refuses  Echo  Tele monitoring  PT/OT  eval.   Neuro checks     Abnormal CT scan, cervical spine  CT cervical spine: Age-indeterminate compression deformity of the T2 vertebral body.  MRI of the thoracic spine may be attempted for further evaluation.No evidence of acute fracture or traumatic malalignment of the cervical spine.Calcification of the ligamentum flavum at the C4-C5 and C5-C6 level resulting in severe narrowing of the spinal canal at these levels. Significant retrodental pannus formation resulting in narrowing at the cervicomedullary junction. Extensive calcifications in the neck vessels.  MRI thoracic spine: Minimal chronic deformities involving the superior endplate of the T2 and T3 vertebral bodies, in keeping with chronic compression deformities. No acute fracture or traumatic malalignment of the thoracic spine.       Hyperphosphatemia  Consult nephrology   Phos 5.6  No other labs to compare level too  Trend  level   IVF      Hypomagnesemia  Patient has Abnormal Magnesium: hypomagnesemia. Will continue to monitor electrolytes closely. Will replace the affected electrolytes and repeat labs to be done after interventions completed. The patient's magnesium results have been reviewed and are listed below.  Recent Labs   Lab 06/24/24  0514   MG 1.0*        Anemia  No evidence of bleeding  Add iron studies   Current CBC reviewed-   Lab Results   Component Value Date    HGB 9.6 (L) 06/24/2024    HCT 29.4 (L) 06/24/2024     Monitor serial CBC and transfuse if patient becomes hemodynamically unstable, symptomatic or H/H drops below 7/21.    Hypertension  PTA metoprolol and Losartan resume. Hold HCTZ   Monitor BP       HLD (hyperlipidemia)  Resume statin       Acute respiratory failure with hypoxia  Patient requiring 2L NC, usually RA   Wean as tolerated     Laceration of head  Posterior occiput laceration with staples intact   Neuro checks  CT head: right parietal scalp laceration.  No evidence of a calvarial fracture or acute intracranial  process.Changes of chronic small vessel ischemic disease and cerebral volume loss. Left preorbital soft tissue swelling.      Type 2 diabetes mellitus  Patient's FSGs are controlled on current medication regimen.  Last A1c reviewed-   Lab Results   Component Value Date    HGBA1C 8.9 (H) 05/03/2011     Most recent fingerstick glucose reviewed-   Recent Labs   Lab 06/23/24 2009 06/24/24  0658   POCTGLUCOSE 83 86     Current correctional scale  Medium  Maintain anti-hyperglycemic dose as follows-   Antihyperglycemics (From admission, onward)      Start     Stop Route Frequency Ordered    06/23/24 1823  insulin aspart U-100 pen 0-10 Units         -- SubQ Before meals & nightly PRN 06/23/24 1727          Hold Oral hypoglycemics while patient is in the hospital.      VTE Risk Mitigation (From admission, onward)           Ordered     Reason for No Pharmacological VTE Prophylaxis  Once        Question:  Reasons:  Answer:  Risk of Bleeding    06/23/24 1727     IP VTE HIGH RISK PATIENT  Once         06/23/24 1727     Place sequential compression device  Until discontinued         06/23/24 1727                    Discharge Planning   OSCAR:      Code Status: Full Code   Is the patient medically ready for discharge?:     Reason for patient still in hospital (select all that apply): Patient trending condition, Consult recommendations, and PT / OT recommendations                     Joby Jose NP  Department of Hospital Medicine   [facility] [place] [facility]

## 2024-06-24 NOTE — NURSING
Pt still unable to sit up without feeling dizzy. Pt lying in bed. Unable to complete orthostatic vitals.

## 2024-06-24 NOTE — SUBJECTIVE & OBJECTIVE
Interval History: Patient seen and examined. Refuses orthostatic BP. She states she get dizzy even with head of bed elevated. Consulted Cards. PT/OT eval. Mg level 1.0. replace and recheck at 130. Patient currently on 2L NC and states she doesn't require O2 at home, she states she get SOB walking far. Desat yesterday to 89% on RA. Consulted nephrology secondary to elevated phos 5.6. Started IVF.     Review of Systems   Constitutional:  Positive for fatigue. Negative for chills and fever.   HENT:  Negative for congestion and rhinorrhea.    Eyes:  Negative for photophobia and visual disturbance.   Respiratory:  Negative for cough and shortness of breath.    Cardiovascular:  Negative for chest pain, palpitations and leg swelling.   Gastrointestinal:  Positive for nausea and vomiting. Negative for abdominal pain and diarrhea.   Genitourinary:  Negative for dysuria, frequency and urgency.   Skin:  Negative for pallor, rash and wound.   Neurological:  Positive for dizziness, syncope and light-headedness. Negative for headaches.   Psychiatric/Behavioral:  Negative for confusion and decreased concentration.      Objective:     Vital Signs (Most Recent):  Temp: 97.8 °F (36.6 °C) (06/24/24 0659)  Pulse: 74 (06/24/24 0807)  Resp: 18 (06/24/24 0807)  BP: (!) 140/65 (06/24/24 0659)  SpO2: 97 % (06/24/24 0807) Vital Signs (24h Range):  Temp:  [97.4 °F (36.3 °C)-98.2 °F (36.8 °C)] 97.8 °F (36.6 °C)  Pulse:  [62-82] 74  Resp:  [15-20] 18  SpO2:  [87 %-100 %] 97 %  BP: (129-191)/() 140/65     Weight: 62.1 kg (136 lb 14.5 oz)  Body mass index is 23.5 kg/m².    Intake/Output Summary (Last 24 hours) at 6/24/2024 1049  Last data filed at 6/24/2024 0850  Gross per 24 hour   Intake 1080 ml   Output 700 ml   Net 380 ml         Physical Exam  Vitals and nursing note reviewed.   Constitutional:       General: She is not in acute distress.     Appearance: She is well-developed.   HENT:      Head: Normocephalic.      Right Ear: External  ear normal.      Left Ear: External ear normal.      Nose: Nose normal.   Eyes:      Conjunctiva/sclera: Conjunctivae normal.   Cardiovascular:      Rate and Rhythm: Normal rate and regular rhythm.   Pulmonary:      Effort: Pulmonary effort is normal. No respiratory distress.   Abdominal:      General: There is no distension.      Palpations: Abdomen is soft.   Skin:     General: Skin is warm and dry.      Findings: Wound present.      Comments: Staples to back of head    Neurological:      Mental Status: She is alert and oriented to person, place, and time.   Psychiatric:         Thought Content: Thought content normal.             Significant Labs: All pertinent labs within the past 24 hours have been reviewed.    Significant Imaging: I have reviewed all pertinent imaging results/findings within the past 24 hours.

## 2024-06-24 NOTE — PLAN OF CARE
Problem: Adult Inpatient Plan of Care  Goal: Plan of Care Review  Outcome: Progressing     Problem: Adult Inpatient Plan of Care  Goal: Patient-Specific Goal (Individualized)  Outcome: Progressing     Problem: Adult Inpatient Plan of Care  Goal: Absence of Hospital-Acquired Illness or Injury  Outcome: Progressing     Problem: Adult Inpatient Plan of Care  Goal: Optimal Comfort and Wellbeing  Outcome: Progressing     Problem: Diabetes Comorbidity  Goal: Blood Glucose Level Within Targeted Range  Outcome: Progressing     Problem: Fall Injury Risk  Goal: Absence of Fall and Fall-Related Injury  Outcome: Progressing     Problem: Skin Injury Risk Increased  Goal: Skin Health and Integrity  Outcome: Progressing

## 2024-06-25 VITALS
HEART RATE: 67 BPM | SYSTOLIC BLOOD PRESSURE: 153 MMHG | RESPIRATION RATE: 17 BRPM | WEIGHT: 136.88 LBS | DIASTOLIC BLOOD PRESSURE: 65 MMHG | OXYGEN SATURATION: 92 % | TEMPERATURE: 98 F | HEIGHT: 64 IN | BODY MASS INDEX: 23.37 KG/M2

## 2024-06-25 LAB
25(OH)D3+25(OH)D2 SERPL-MCNC: 42 NG/ML (ref 30–96)
ALBUMIN SERPL BCP-MCNC: 3.4 G/DL (ref 3.5–5.2)
ALP SERPL-CCNC: 45 U/L (ref 55–135)
ALT SERPL W/O P-5'-P-CCNC: <5 U/L (ref 10–44)
ANION GAP SERPL CALC-SCNC: 7 MMOL/L (ref 8–16)
AST SERPL-CCNC: 11 U/L (ref 10–40)
BASOPHILS # BLD AUTO: 0.03 K/UL (ref 0–0.2)
BASOPHILS NFR BLD: 0.5 % (ref 0–1.9)
BILIRUB SERPL-MCNC: 0.4 MG/DL (ref 0.1–1)
BUN SERPL-MCNC: 12 MG/DL (ref 8–23)
CALCIUM SERPL-MCNC: 8.5 MG/DL (ref 8.7–10.5)
CHLORIDE SERPL-SCNC: 105 MMOL/L (ref 95–110)
CO2 SERPL-SCNC: 26 MMOL/L (ref 23–29)
CREAT SERPL-MCNC: 0.7 MG/DL (ref 0.5–1.4)
DIFFERENTIAL METHOD BLD: ABNORMAL
EOSINOPHIL # BLD AUTO: 0.2 K/UL (ref 0–0.5)
EOSINOPHIL NFR BLD: 2.4 % (ref 0–8)
ERYTHROCYTE [DISTWIDTH] IN BLOOD BY AUTOMATED COUNT: 13.5 % (ref 11.5–14.5)
EST. GFR  (NO RACE VARIABLE): >60 ML/MIN/1.73 M^2
FOLATE SERPL-MCNC: 15.6 NG/ML (ref 4–24)
GLUCOSE SERPL-MCNC: 126 MG/DL (ref 70–110)
HCT VFR BLD AUTO: 31.1 % (ref 37–48.5)
HGB BLD-MCNC: 10.1 G/DL (ref 12–16)
IMM GRANULOCYTES # BLD AUTO: 0.02 K/UL (ref 0–0.04)
IMM GRANULOCYTES NFR BLD AUTO: 0.3 % (ref 0–0.5)
LYMPHOCYTES # BLD AUTO: 1.4 K/UL (ref 1–4.8)
LYMPHOCYTES NFR BLD: 20.7 % (ref 18–48)
MAGNESIUM SERPL-MCNC: 1.9 MG/DL (ref 1.6–2.6)
MCH RBC QN AUTO: 28.9 PG (ref 27–31)
MCHC RBC AUTO-ENTMCNC: 32.5 G/DL (ref 32–36)
MCV RBC AUTO: 89 FL (ref 82–98)
MONOCYTES # BLD AUTO: 0.9 K/UL (ref 0.3–1)
MONOCYTES NFR BLD: 13.7 % (ref 4–15)
NEUTROPHILS # BLD AUTO: 4.1 K/UL (ref 1.8–7.7)
NEUTROPHILS NFR BLD: 62.4 % (ref 38–73)
NRBC BLD-RTO: 0 /100 WBC
OHS QRS DURATION: 76 MS
OHS QTC CALCULATION: 495 MS
PHOSPHATE SERPL-MCNC: 4 MG/DL (ref 2.7–4.5)
PLATELET # BLD AUTO: 223 K/UL (ref 150–450)
PMV BLD AUTO: 10.1 FL (ref 9.2–12.9)
POCT GLUCOSE: 136 MG/DL (ref 70–110)
POCT GLUCOSE: 255 MG/DL (ref 70–110)
POTASSIUM SERPL-SCNC: 3.6 MMOL/L (ref 3.5–5.1)
PROT SERPL-MCNC: 6.9 G/DL (ref 6–8.4)
PTH-INTACT SERPL-MCNC: 81.5 PG/ML (ref 9–77)
RBC # BLD AUTO: 3.49 M/UL (ref 4–5.4)
SODIUM SERPL-SCNC: 138 MMOL/L (ref 136–145)
VIT B12 SERPL-MCNC: 217 PG/ML (ref 210–950)
WBC # BLD AUTO: 6.56 K/UL (ref 3.9–12.7)

## 2024-06-25 PROCEDURE — 97535 SELF CARE MNGMENT TRAINING: CPT

## 2024-06-25 PROCEDURE — 85025 COMPLETE CBC W/AUTO DIFF WBC: CPT | Performed by: HOSPITALIST

## 2024-06-25 PROCEDURE — 63600175 PHARM REV CODE 636 W HCPCS: Performed by: HOSPITALIST

## 2024-06-25 PROCEDURE — 97161 PT EVAL LOW COMPLEX 20 MIN: CPT

## 2024-06-25 PROCEDURE — 80053 COMPREHEN METABOLIC PANEL: CPT | Performed by: HOSPITALIST

## 2024-06-25 PROCEDURE — 99223 1ST HOSP IP/OBS HIGH 75: CPT | Mod: FS,,, | Performed by: INTERNAL MEDICINE

## 2024-06-25 PROCEDURE — 36415 COLL VENOUS BLD VENIPUNCTURE: CPT | Performed by: INTERNAL MEDICINE

## 2024-06-25 PROCEDURE — 25000003 PHARM REV CODE 250: Performed by: NURSE PRACTITIONER

## 2024-06-25 PROCEDURE — 25000003 PHARM REV CODE 250: Performed by: HOSPITALIST

## 2024-06-25 PROCEDURE — 97530 THERAPEUTIC ACTIVITIES: CPT

## 2024-06-25 PROCEDURE — 82306 VITAMIN D 25 HYDROXY: CPT | Performed by: INTERNAL MEDICINE

## 2024-06-25 PROCEDURE — 83735 ASSAY OF MAGNESIUM: CPT | Performed by: HOSPITALIST

## 2024-06-25 PROCEDURE — 99233 SBSQ HOSP IP/OBS HIGH 50: CPT | Mod: ,,, | Performed by: INTERNAL MEDICINE

## 2024-06-25 PROCEDURE — 83970 ASSAY OF PARATHORMONE: CPT | Performed by: INTERNAL MEDICINE

## 2024-06-25 PROCEDURE — 84100 ASSAY OF PHOSPHORUS: CPT | Performed by: HOSPITALIST

## 2024-06-25 RX ORDER — AMLODIPINE BESYLATE 5 MG/1
5 TABLET ORAL DAILY
Qty: 90 TABLET | Refills: 0 | Status: SHIPPED | OUTPATIENT
Start: 2024-06-25 | End: 2024-09-23

## 2024-06-25 RX ORDER — TRAMADOL HYDROCHLORIDE 50 MG/1
50 TABLET ORAL EVERY 6 HOURS PRN
Status: DISCONTINUED | OUTPATIENT
Start: 2024-06-25 | End: 2024-06-25 | Stop reason: HOSPADM

## 2024-06-25 RX ORDER — AMLODIPINE BESYLATE 5 MG/1
5 TABLET ORAL DAILY
Status: DISCONTINUED | OUTPATIENT
Start: 2024-06-25 | End: 2024-06-25 | Stop reason: HOSPADM

## 2024-06-25 RX ORDER — BACLOFEN 5 MG/1
5 TABLET ORAL 3 TIMES DAILY
Status: DISCONTINUED | OUTPATIENT
Start: 2024-06-25 | End: 2024-06-25 | Stop reason: HOSPADM

## 2024-06-25 RX ORDER — TRAMADOL HYDROCHLORIDE 50 MG/1
50 TABLET ORAL EVERY 6 HOURS PRN
Status: DISCONTINUED | OUTPATIENT
Start: 2024-06-25 | End: 2024-06-25

## 2024-06-25 RX ADMIN — BACLOFEN 5 MG: 5 TABLET ORAL at 03:06

## 2024-06-25 RX ADMIN — PRAVASTATIN SODIUM 40 MG: 40 TABLET ORAL at 09:06

## 2024-06-25 RX ADMIN — SODIUM CHLORIDE: 9 INJECTION, SOLUTION INTRAVENOUS at 01:06

## 2024-06-25 RX ADMIN — BACLOFEN 5 MG: 5 TABLET ORAL at 09:06

## 2024-06-25 RX ADMIN — AMLODIPINE BESYLATE 5 MG: 5 TABLET ORAL at 10:06

## 2024-06-25 RX ADMIN — ACETAMINOPHEN 650 MG: 325 TABLET ORAL at 01:06

## 2024-06-25 RX ADMIN — INSULIN ASPART 6 UNITS: 100 INJECTION, SOLUTION INTRAVENOUS; SUBCUTANEOUS at 12:06

## 2024-06-25 NOTE — SUBJECTIVE & OBJECTIVE
Interval History:  Patient feeling better.  Denies chest pains at rest on exertion, orthopnea, PND.  Dizziness getting better after IV hydration.    Review of Systems   Constitutional: Negative.   HENT: Negative.     Eyes: Negative.    Respiratory: Negative.     Hematologic/Lymphatic: Negative.    Skin: Negative.    Musculoskeletal: Negative.    Gastrointestinal: Negative.    Genitourinary: Negative.    Neurological: Negative.    Psychiatric/Behavioral: Negative.     Allergic/Immunologic: Negative.      Objective:     Vital Signs (Most Recent):  Temp: 97.6 °F (36.4 °C) (06/25/24 1217)  Pulse: 67 (06/25/24 1217)  Resp: 17 (06/25/24 1217)  BP: (!) 153/65 (06/25/24 1217)  SpO2: (!) 92 % (06/25/24 1217) Vital Signs (24h Range):  Temp:  [97.5 °F (36.4 °C)-98 °F (36.7 °C)] 97.6 °F (36.4 °C)  Pulse:  [54-76] 67  Resp:  [17-20] 17  SpO2:  [92 %-96 %] 92 %  BP: (144-181)/(64-75) 153/65     Weight: 62.1 kg (136 lb 14.5 oz)  Body mass index is 23.5 kg/m².     SpO2: (!) 92 %         Intake/Output Summary (Last 24 hours) at 6/25/2024 1417  Last data filed at 6/25/2024 1100  Gross per 24 hour   Intake 2332.7 ml   Output 2900 ml   Net -567.3 ml       Lines/Drains/Airways       None                      Physical Exam  Constitutional:       Appearance: Normal appearance. She is well-developed.   HENT:      Head: Normocephalic.   Eyes:      Pupils: Pupils are equal, round, and reactive to light.   Cardiovascular:      Rate and Rhythm: Normal rate and regular rhythm.   Pulmonary:      Effort: Pulmonary effort is normal.      Breath sounds: Normal breath sounds.   Abdominal:      General: Bowel sounds are normal.      Palpations: Abdomen is soft.      Tenderness: There is no abdominal tenderness.   Musculoskeletal:         General: Normal range of motion.      Cervical back: Normal range of motion and neck supple.   Skin:     General: Skin is warm.   Neurological:      Mental Status: She is alert and oriented to person, place, and  time.            Significant Labs:       DATA:     Laboratory:  CBC:  Recent Labs   Lab 06/23/24  1408 06/24/24  0514 06/25/24  0449   WBC 7.22 6.06 6.56   Hemoglobin 10.2 L 9.6 L 10.1 L   Hematocrit 29.8 L 29.4 L 31.1 L   Platelets 241 221 223       CHEMISTRIES:  Recent Labs   Lab 06/23/24  1408 06/24/24  0514 06/24/24  1323 06/25/24  0449   Glucose 107 68 L  --  126 H   Sodium 139 141  --  138   Potassium 3.8 3.6  --  3.6   BUN 22 20  --  12   Creatinine 0.9 0.8  --  0.7   Calcium 8.7 8.4 L  --  8.5 L   Magnesium  --  1.0 L 2.3 1.9       CARDIAC BIOMARKERS:  Recent Labs   Lab 06/23/24  1408 06/23/24  1845 06/23/24  2137   Troponin I <0.006 <0.006 <0.006       COAGS:        LIPIDS/LFTS:  Recent Labs   Lab 06/23/24  1408 06/24/24  0514 06/25/24  0449   AST 17 11 11   ALT 8 L 7 L <5 L       Hemoglobin A1C   Date Value Ref Range Status   05/03/2011 8.9 (H) 4.0 - 6.2 % Final       TSH        The ASCVD Risk score (Pillow DK, et al., 2019) failed to calculate for the following reasons:    The 2019 ASCVD risk score is only valid for ages 40 to 79       BNP    Lab Results   Component Value Date/Time    BNP 59 06/23/2024 02:08 PM            ECHO    Results for orders placed during the hospital encounter of 06/23/24    Echo    Interpretation Summary    Left Ventricle: The left ventricle is normal in size. Normal wall thickness. There is normal systolic function with a visually estimated ejection fraction of 55 - 60%. Grade I diastolic dysfunction.    Right Ventricle: Normal right ventricular cavity size. Systolic function is normal.    Left Atrium: Left atrium is moderately dilated.    Right Atrium: Right atrium is mildly dilated.    Aortic Valve: There is mild aortic regurgitation.    Mitral Valve: There is mild regurgitation.    Tricuspid Valve: There is mild regurgitation.    Pulmonary Artery: The estimated pulmonary artery systolic pressure is 8 mmHg.    IVC/SVC: Normal venous pressure at 3 mmHg.      STRESS  TEST    Results for orders placed during the hospital encounter of 07/08/21    Nuclear Stress - Cardiology Interpreted    Interpretation Summary    Normal myocardial perfusion scan. There is no evidence of myocardial ischemia or infarction.    The gated perfusion images showed an ejection fraction of 68% post stress.    There is normal wall motion post stress.    The EKG portion of this study is negative for ischemia.    The patient reported no chest pain during the stress test.    There were no arrhythmias during stress.        CATH    No results found for this or any previous visit.

## 2024-06-25 NOTE — PLAN OF CARE
Problem: Adult Inpatient Plan of Care  Goal: Plan of Care Review  Outcome: Adequate for Care Transition  Goal: Patient-Specific Goal (Individualized)  Outcome: Adequate for Care Transition  Goal: Absence of Hospital-Acquired Illness or Injury  Outcome: Adequate for Care Transition  Goal: Optimal Comfort and Wellbeing  Outcome: Adequate for Care Transition  Goal: Readiness for Transition of Care  Outcome: Adequate for Care Transition     Problem: Infection  Goal: Absence of Infection Signs and Symptoms  Outcome: Adequate for Care Transition     Problem: Diabetes Comorbidity  Goal: Blood Glucose Level Within Targeted Range  Outcome: Adequate for Care Transition     Problem: Fall Injury Risk  Goal: Absence of Fall and Fall-Related Injury  Outcome: Adequate for Care Transition     Problem: Skin Injury Risk Increased  Goal: Skin Health and Integrity  Outcome: Adequate for Care Transition

## 2024-06-25 NOTE — PLAN OF CARE
Case Management Final Discharge Note      Discharge Disposition: Home health services with [facility] [place] (Home Health): [phone]    New DME ordered / company name: [employer] [employer] rolling walker delivered to patient bedside    Relevant SDOH / Transition of Care Barriers:  None    Primary Caretaker and contact information: Patient is somewhat independent but has adult children available to assist her at home as needed; Marni Carranza (Daughter) [phone]; Merrill Carranza (Son) [phone]      Scheduled followup appointment: Follow up appointment with PCP scheduled and added to patient AVS    Referrals placed: Referrals placed to Nephrology and Cardiology    Transportation: Private vehicle/family taking patient home        Patient and family educated on discharge services and updated on DC plan.  Patient to receive home health services from [facility]; CM sent referrals in [facility] and admissions confirmed that agency will service patient and contact family to schedule initial appointment. I provided the patient a choice of post acute providers and offered a list of CMS rated home health agencies. Patient has declined to select a preferred provider and elects placement with the first accepting in network provider that is available to provide services as ordered by the physician. Bedside RN notified, patient clear to discharge from Case Management Perspective.    06/25/24 1606   Final Note   Assessment Type Final Discharge Note   Anticipated Discharge Disposition Home-Health   Hospital Resources/Appts/Education Provided Provided patient/caregiver with written discharge plan information;Appointments scheduled and added to AVS   Post-Acute Status   Post-Acute Authorization Home Health   Home Health Status Set-up Complete/Auth obtained   Discharge Delays None known at this time

## 2024-06-25 NOTE — NURSING
Pt resting in bed, IV infusing fluids as ordered.  Tele box monitored. Pt denies pain or distress.  Purewick in place.  No acute distress noted, pt free from falls or injury.  Bed in low position, wheels locked, bed alarm on, call light in reach for assistance, will continue to monitor.    [facility] [place]  Nurses Note -- 4 Eyes      6/24/2024       Skin assessed on: Q Shift      [x] No Pressure Injuries Present    [x]Prevention Measures Documented    [] Yes LDA  for Pressure Injury Previously documented     [] Yes New Pressure Injury Discovered   [] LDA for New Pressure Injury Added      Attending RN:  Sally Sepulveda, RN     Second RN:  BITA Mendoza      3

## 2024-06-25 NOTE — PT/OT/SLP EVAL
Occupational Therapy Evaluation and Treatment    Name: Marya Carranza  MRN: [id number]  Admitting Diagnosis: Syncope  Recent Surgery: * No surgery found *      Recommendations:     Discharge Recommendations: Low Intensity Therapy  Level of Assistance Recommended: Intermittent assistance  Discharge Equipment Recommendations: bath bench, walker, rolling  Barriers to discharge: None    Assessment:     Marya Carranza is a 83 y.o. female with a medical diagnosis of Syncope. She presents with performance deficits affecting function including weakness, impaired endurance, impaired sensation, impaired functional mobility, gait instability, impaired balance, decreased upper extremity function, impaired skin.     Rehab Prognosis: Good; patient would benefit from acute OT services to address these deficits and reach maximum level of function.    Plan:     Patient to be seen 3 x/week to address the above listed problems via self-care/home management, therapeutic activities, therapeutic groups  Plan of Care Expires: 07/02/24  Plan of Care Reviewed with: patient, daughter    Subjective     Chief Complaint: none  Patient Comments/Goals: agreeable to OT eval  Pain/Comfort:  Pain Rating 1: 0/10    Patients cultural, spiritual, Rastafari conflicts given the current situation: no    Social History:  Living Environment: Patient lives with their daughter in a single story home with number of outside stair(s): 0, tub-shower combo, and glass shower doors  Prior Level of Function: Prior to admission, patient requires assistance with ADLs including driving  Roles and Routines: Patient was driving and enjoys playing games  prior to admission. The patient reports falling at a restaurant last week by tripping over a chair.   Equipment Used at Home: shower chair, grab bars in the tub  DME owned (not currently used): none The patient plans to place a grab bar next to her toilet  Assistance Upon Discharge: family    Objective:     Communicated with  nurseMarilee prior to session. Patient found up in chair with peripheral IV, telemetry upon OT entry to room.    General Precautions: Standard, fall, diabetic   Orthopedic Precautions: N/A   Braces: N/A    Respiratory Status: Room air    Occupational Performance    Gait belt applied - Yes    Bed Mobility:   N/T    Functional Mobility/Transfers:  Sit <> Stand Transfer with minimum assistance with rolling walker and VC for hand placement from bedside chair. The patient was able to stand on a 2nd trial with SBA  Toilet Transfer Step Transfer technique with stand by assistance with rolling walker and grab bars  Functional Mobility: The patient amb using a RW to the toilet and sink with VC for RW placement at the sink and toilet. The patient was able to manage the bathroom door with VC.    Activities of Daily Living:  Grooming: stand by assistance to stand at the sink to wash her hands  Toileting: stand by assistance    Cognitive/Visual Perceptual:  Cognitive/Psychosocial Skills:    -     Oriented to: Person, Place, Time, Situation  -     Follows Commands/attention: Follows multistep  commands  -     Communication: clear/fluent  -     Memory: No Deficits noted  -     Safety awareness/insight to disability: impaired and VC for RW use  -     Mood/Affect/Coping skills/emotional control: Appropriate to situation    Physical Exam:  Balance:    -     Sitting: modified independence  -     Standing: stand by assistance and contact guard assistance  Postural examination/scapula alignment:    -       No postural abnormalities identified  Skin integrity: scalp abrasion 2* fall  Edema:  None noted  Sensation:    -       Intact  BUE  Dominant hand: Right  Upper Extremity Range of Motion:     -       Right Upper Extremity: WFL  -       Left Upper Extremity: WFL  Upper Extremity Strength:    -       Right Upper Extremity: WFL  -       Left Upper Extremity: WFL   Strength:    -       Right Upper Extremity: WFL  -       Left Upper  Extremity: WFL    [facility] 6 Click ADL:  AMPA Total Score: 22    Treatment & Education:  Patient educated on role of OT, POC, and goals for therapy  Performed self care and functional mobility as noted above  Educated the patient re: home safety and fall prevention verbally and via handout. The patient reoports having a tub shower with a glass shower door with a Shower chair. OT recommended to remove the shower door and purchase a TTB for safety. The patient's daughter was present during education.    Patient clear to ambulate to/from bathroom with RN/PCT, assist xSBA .    Patient left up in chair with all lines intact, call button in reach, and daughter present.    GOALS:   Multidisciplinary Problems       Occupational Therapy Goals          Problem: Occupational Therapy    Goal Priority Disciplines Outcome Interventions   Occupational Therapy Goal     OT, PT/OT Progressing    Description: Goals to be met by: 7/2/2024     Patient will increase functional independence with ADLs by performing:    UE Dressing with Modified [place].  LE Dressing with Modified [place].  Grooming while standing at sink with Modified [place] and Assistive Devices as needed.  Supine to sit with Modified [place].  Step transfer with Modified [place]  Toilet transfer to toilet with Modified [place].  Upper extremity exercise program x15 reps per handout, with independence.                         History:     Past Medical History:   Diagnosis Date    Diabetes mellitus     Hypertension          Past Surgical History:   Procedure Laterality Date    HYSTERECTOMY      OOPHORECTOMY         Time Tracking:     OT Date of Treatment: 06/25/24  OT Start Time: 1050  OT Stop Time: 1113  OT Total Time (min): 23 min    Billable Minutes: Evaluation 15 and Self Care/Home Management 8    6/25/2024

## 2024-06-25 NOTE — PLAN OF CARE
Problem: Adult Inpatient Plan of Care  Goal: Plan of Care Review  Outcome: Progressing     Problem: Adult Inpatient Plan of Care  Goal: Patient-Specific Goal (Individualized)  Outcome: Progressing     Problem: Adult Inpatient Plan of Care  Goal: Optimal Comfort and Wellbeing  Outcome: Progressing     Problem: Infection  Goal: Absence of Infection Signs and Symptoms  Outcome: Progressing     Problem: Diabetes Comorbidity  Goal: Blood Glucose Level Within Targeted Range  Outcome: Progressing     Problem: Skin Injury Risk Increased  Goal: Skin Health and Integrity  Outcome: Progressing

## 2024-06-25 NOTE — SUBJECTIVE & OBJECTIVE
Past Medical History:   Diagnosis Date    Diabetes mellitus     Hypertension        Past Surgical History:   Procedure Laterality Date    HYSTERECTOMY      OOPHORECTOMY         Review of patient's allergies indicates:  No Known Allergies  Current Facility-Administered Medications   Medication Frequency    0.9%  NaCl infusion Continuous    acetaminophen tablet 650 mg Q6H PRN    albuterol-ipratropium 2.5 mg-0.5 mg/3 mL nebulizer solution 3 mL Q4H PRN    aluminum-magnesium hydroxide-simethicone 200-200-20 mg/5 mL suspension 30 mL QID PRN    amLODIPine tablet 5 mg Daily    baclofen tablet 5 mg TID    dextrose 10% bolus 125 mL 125 mL PRN    dextrose 10% bolus 250 mL 250 mL PRN    glucagon (human recombinant) injection 1 mg PRN    glucose chewable tablet 16 g PRN    glucose chewable tablet 24 g PRN    insulin aspart U-100 pen 0-10 Units QID (AC + HS) PRN    melatonin tablet 6 mg Nightly PRN    naloxone 0.4 mg/mL injection 0.02 mg PRN    ondansetron injection 4 mg Q8H PRN    polyethylene glycol packet 17 g Daily    pravastatin tablet 40 mg Daily    prochlorperazine injection Soln 5 mg Q6H PRN    simethicone chewable tablet 80 mg QID PRN    sodium chloride 0.9% flush 10 mL Q8H PRN    traMADoL tablet 50 mg Q6H PRN     Family History       Problem Relation (Age of Onset)    Breast cancer Sister          Tobacco Use    Smoking status: Never    Smokeless tobacco: Never   Substance and Sexual Activity    Alcohol use: Not on file    Drug use: Not on file    Sexual activity: Not on file     Review of Systems   Constitutional:  Negative for chills and fever.   Respiratory:  Negative for cough and shortness of breath.    Cardiovascular:  Negative for chest pain.   Gastrointestinal:  Negative for abdominal pain.   Endocrine: Negative for heat intolerance.   Musculoskeletal:  Negative for back pain.   Skin:  Negative for color change.   Neurological:  Negative for headaches.   Hematological:  Negative for adenopathy.    Psychiatric/Behavioral:  Negative for confusion.      Objective:     Vital Signs (Most Recent):  Temp: 97.6 °F (36.4 °C) (06/25/24 1217)  Pulse: 67 (06/25/24 1217)  Resp: 17 (06/25/24 1217)  BP: (!) 153/65 (06/25/24 1217)  SpO2: (!) 92 % (06/25/24 1217) Vital Signs (24h Range):  Temp:  [97.5 °F (36.4 °C)-98 °F (36.7 °C)] 97.6 °F (36.4 °C)  Pulse:  [54-76] 67  Resp:  [17-20] 17  SpO2:  [92 %-96 %] 92 %  BP: (144-181)/(64-75) 153/65     Weight: 62.1 kg (136 lb 14.5 oz) (06/23/24 1943)  Body mass index is 23.5 kg/m².  Body surface area is 1.67 meters squared.    I/O last 3 completed shifts:  In: 2792.7 [P.O.:1780; I.V.:1012.7]  Out: 2800 [Urine:2800]     Physical Exam  Constitutional:       Appearance: Normal appearance.   HENT:      Head: Normocephalic.   Cardiovascular:      Rate and Rhythm: Normal rate.      Pulses: Normal pulses.   Pulmonary:      Effort: Pulmonary effort is normal. No respiratory distress.   Abdominal:      General: Abdomen is flat.   Musculoskeletal:      Cervical back: Normal range of motion.      Right lower leg: No edema.      Left lower leg: No edema.   Skin:     General: Skin is warm.   Neurological:      General: No focal deficit present.      Mental Status: She is alert.   Psychiatric:         Mood and Affect: Mood normal.          Significant Labs:  CBC:   Recent Labs   Lab 06/25/24 0449   WBC 6.56   RBC 3.49*   HGB 10.1*   HCT 31.1*      MCV 89   MCH 28.9   MCHC 32.5     CMP:   Recent Labs   Lab 06/25/24 0449   *   CALCIUM 8.5*   ALBUMIN 3.4*   PROT 6.9      K 3.6   CO2 26      BUN 12   CREATININE 0.7   ALKPHOS 45*   ALT <5*   AST 11   BILITOT 0.4     PTH:   Recent Labs   Lab 06/25/24  0449   PTH 81.5*     All labs within the past 24 hours have been reviewed.    Significant Imaging:  Labs: Reviewed  ECG: Reviewed  CT: Reviewed  Echo: Reviewed

## 2024-06-25 NOTE — CONSULTS
[facility] - [facility] Surg  Cardiology  Consult Note    Patient Name: Marya Carranza  MRN: [id number]  Admission Date: 6/23/2024  Hospital Length of Stay: 0 days  Code Status: Full Code   Attending Provider: Toan Briceno III, MD   Consulting Provider: Jorden Sumner MD  Primary Care Physician: Phillip Levy MD  Principal Problem:Syncope    Patient information was obtained from patient and ER records.     Inpatient consult to Cardiology  Consult performed by: Jorden Sumner MD  Consult ordered by: Joby Jose NP        Subjective:     Chief Complaint:  cp     HPI:   Patient is a pleasant 83-year-old lady.  Follows with [facility].  Denies chest pains at rest on exertion, orthopnea, PND.  Past medical history significant for coronary artery disease as detailed below.  Had a coronary CTA done in 2023 which revealed coronary calcifications as well as atherosclerotic coronary artery disease.  Subsequent stress test did not show any significant ischemia.  Patient currently came in with dizziness and syncope.  Syncope occurred when she was sitting and got up and felt dizzy and fell down.  Orthostatics checked today and chest from sitting to standing her systolic pressure dropped 20 mmHg and she got dizzy.  Currently denies any anginal sounding chest pains orthopnea or PND           Loss of Consciousness       82 yo fem to triage via EMS for syncopal episode w/ head injury.Pt has a lac to the back of her head, takes ASA daily. Pt is currently in C-Collar, and bleeding controlled. VSS, NAD, AAOx4    Laceration         HPI: 83 y.o. female with DM2 presents with a complaint of syncope.  Acute onset this morning, after standing from a seated position, associated with lightheadedness and fatigue as well as nausea and vomiting.  Hit head on floor, lac to posterior scalp.  Denies fever, chills, cough, SOB, chest pain, palpitations, diarrhea, or abdominal pain.  In the ED, CT cervical spine shows evidence of T2  compression fracture of uncertain chronicity.  MRI thoracic spice shows only stable chronic changes to the T2 T3 endplates. Otherwise unremarkable for acute abnormality.  Placed in observation.    The Agatston calcium score is 664. This places the patient in the 75th percentile percentile for age and gender matched patients.   The coronary artery calcium breakdown is as follows:     Left main: 0   LAD: 482   LCx: 182   RCA: 0     Myocardium Appearance and Function:     LV EDV: 28     LV ESV: 108%     LV SV: 80%     LV Ejection Fraction. 73%     RV EDV: 45   RV ESV: 97   RV SV: 52   RV Ejection Fraction. 53       Coronary Arteries:   This patient has a left dominant system with with the posterior descending artery arising from the left circumflex.   Coronary Artery Findings.  There are atherosclerotic changes along the course of the left circumflex artery producing areas of up to mild narrowing. There are dense calcifications involving the LAD probably 60-70 percent stenosis proximally and LAD. There are additional mild to moderate multifocal stenoses throughout the more distal branches of the LAD. Atherosclerotic changes along the course of the RCA is demonstrated without evidence for hemodynamically significant narrowing except for perhaps up to mild narrowing at the ostia.     IMPRESSION:     The Agatston calcium score is 664. This places the patient in the 75th percentile percentile for age and gender matched patients.   The coronary artery calcium breakdown is as follows:     Left main: 0   LAD: 482   LCx: 182   RCA: 0     Coronary atherosclerotic changes as discussed above.     Normal ejection fraction.     Mildly elevated cardiothoracic ratio raising the possibility for mild cardiomegaly. No pericardial effusion.     Other findings as discussed above.     Electronically Signed By: Jayme Horowitz MD 3/14/2023 15:47 CDT, [facility]  Exam End: 03/14/23 10:01           Coronary Arteries:   This  patient has a left dominant system with with the posterior descending artery arising from the left circumflex.   Coronary Artery Findings.  There are atherosclerotic changes along the course of the left circumflex artery producing areas of up to mild narrowing. There are dense calcifications involving the LAD probably 60-70 percent stenosis proximally and LAD. There are additional mild to moderate multifocal stenoses throughout the more distal branches of the LAD. Atherosclerotic changes along the course of the RCA is demonstrated without evidence for hemodynamically significant narrowing except for perhaps up to mild narrowing at the ostia.    The Agatston calcium score is 664. This places the patient in the 75th percentile percentile for age and gender matched patients.   The coronary artery calcium breakdown is as follows:     Left main: 0   LAD: 482   LCx: 182   RCA: 0     Coronary atherosclerotic changes as discussed above.     Normal ejection fraction.     Mildly elevated cardiothoracic ratio raising the possibility for mild cardiomegaly. No pericardial effusion.     Other findings as discussed above.     Electronically Signed By: Jayme Horowitz MD 3/14/2023 15:47 CDT, [facility]    Lexiscan-     EF >70%      Normal-no ischemia         Brayden Yost     (Electronically Signed)     Final Date:10 Erika 2024 10:40     CONCLUSIONS     * Left ventricular ejection fraction is estimated at >55 %. Indeterminate diastolic function.      * Normal right ventricular size and systolic function, RVSP 17 mmHg.      * Moderate mitral annular calcification. Thickened mitral valve. Mild mitral valve regurgitation.      * Aortic valve sclerosis. Thickened aortic valve.  Mild aortic valve regurgitation.      * Structurally normal tricuspid valve. Mild tricuspid valve regurgitation.      Other findings as noted above.            Brayden Yost    (Electronically Signed)     Final Date: 21 June  2024 22:09    The left ventricle is normal in size with concentric remodeling and normal systolic function.  Mild left atrial enlargement.  The estimated ejection fraction is 65%.  Grade I left ventricular diastolic dysfunction.  Normal right ventricular size with normal right ventricular systolic function.  Mild aortic regurgitation.  Mild mitral regurgitation.  Normal central venous pressure (3 mmHg).  The estimated PA systolic pressure is 7 mmHg.  There is no pulmonary hypertension.      Normal myocardial perfusion scan. There is no evidence of myocardial ischemia or infarction.    The gated perfusion images showed an ejection fraction of 68% post stress.    There is normal wall motion post stress.    The EKG portion of this study is negative for ischemia.    The patient reported no chest pain during the stress test.    There were no arrhythmias during stress.      Results for orders placed or performed in visit on 06/15/21   EKG 12-lead    Collection Time: 06/15/21 10:41 AM    Narrative    Test Reason :     Vent. Rate : 067 BPM     Atrial Rate : 067 BPM     P-R Int : 216 ms          QRS Dur : 072 ms      QT Int : 428 ms       P-R-T Axes : 051 -16 074 degrees     QTc Int : 452 ms    Sinus rhythm with 1st degree A-V block  Possible Anterior infarct (cited on or before 11-OCT-2019)  Abnormal ECG  When compared with ECG of 11-OCT-2019 09:56,  No significant change was found  Confirmed by Conrad Bush MD ([id number]) on 6/23/2021 9:22:14 PM    Referred By:  JAMES           Confirmed By:Conrad Bush MD       Results for orders placed during the hospital encounter of 06/23/24    Echo    Interpretation Summary    Left Ventricle: The left ventricle is normal in size. Normal wall thickness. There is normal systolic function with a visually estimated ejection fraction of 55 - 60%. Grade I diastolic dysfunction.    Right Ventricle: Normal right ventricular cavity size. Systolic function is normal.    Left Atrium: Left  atrium is moderately dilated.    Right Atrium: Right atrium is mildly dilated.    Aortic Valve: There is mild aortic regurgitation.    Mitral Valve: There is mild regurgitation.    Tricuspid Valve: There is mild regurgitation.    Pulmonary Artery: The estimated pulmonary artery systolic pressure is 8 mmHg.    IVC/SVC: Normal venous pressure at 3 mmHg.      Results for orders placed during the hospital encounter of 07/08/21    Nuclear Stress - Cardiology Interpreted    Interpretation Summary    Normal myocardial perfusion scan. There is no evidence of myocardial ischemia or infarction.    The gated perfusion images showed an ejection fraction of 68% post stress.    There is normal wall motion post stress.    The EKG portion of this study is negative for ischemia.    The patient reported no chest pain during the stress test.    There were no arrhythmias during stress.      No results found for this or any previous visit.          Past Medical History:   Diagnosis Date    Diabetes mellitus     Hypertension        Past Surgical History:   Procedure Laterality Date    HYSTERECTOMY      OOPHORECTOMY         Review of patient's allergies indicates:  No Known Allergies    No current facility-administered medications on file prior to encounter.     Current Outpatient Medications on File Prior to Encounter   Medication Sig    baclofen (LIORESAL) 10 MG tablet Take 0.5 tablets (5 mg total) by mouth 3 (three) times daily.    glipiZIDE (GLUCOTROL) 10 MG tablet Take 10 mg by mouth 2 (two) times daily.    losartan-hydrochlorothiazide 100-25 mg (HYZAAR) 100-25 mg per tablet Take 1 tablet by mouth once daily.    metFORMIN (GLUCOPHAGE) 1000 MG tablet Take 1,000 mg by mouth 2 (two) times daily with meals.    metoprolol succinate (TOPROL-XL) 100 MG 24 hr tablet Take 100 mg by mouth once daily.    metoprolol succinate (TOPROL-XL) 50 MG 24 hr tablet Take 1 tablet (50 mg total) by mouth once daily.    omeprazole (PRILOSEC) 20 MG capsule  Take 20 mg by mouth 2 (two) times daily.    pravastatin (PRAVACHOL) 40 MG tablet Take 40 mg by mouth once daily.     Family History       Problem Relation (Age of Onset)    Breast cancer Sister          Tobacco Use    Smoking status: Never    Smokeless tobacco: Never   Substance and Sexual Activity    Alcohol use: Not on file    Drug use: Not on file    Sexual activity: Not on file     Review of Systems   Constitutional: Negative.   HENT: Negative.     Eyes: Negative.    Cardiovascular:  Positive for near-syncope and syncope.   Respiratory: Negative.     Endocrine: Negative.    Hematologic/Lymphatic: Negative.    Skin: Negative.    Musculoskeletal: Negative.    Gastrointestinal: Negative.    Genitourinary: Negative.    Psychiatric/Behavioral: Negative.     Allergic/Immunologic: Negative.      Objective:     Vital Signs (Most Recent):  Temp: 97.8 °F (36.6 °C) (06/24/24 1922)  Pulse: (!) 59 (06/24/24 1922)  Resp: 20 (06/24/24 1922)  BP: (!) 151/64 (06/24/24 1922)  SpO2: 95 % (06/24/24 1922) Vital Signs (24h Range):  Temp:  [97.6 °F (36.4 °C)-97.9 °F (36.6 °C)] 97.8 °F (36.6 °C)  Pulse:  [59-79] 59  Resp:  [18-20] 20  SpO2:  [95 %-99 %] 95 %  BP: (129-181)/(58-75) 151/64     Weight: 62.1 kg (136 lb 14.5 oz)  Body mass index is 23.5 kg/m².    SpO2: 95 %         Intake/Output Summary (Last 24 hours) at 6/24/2024 1933  Last data filed at 6/24/2024 1904  Gross per 24 hour   Intake 1520.54 ml   Output 2200 ml   Net -679.46 ml       Lines/Drains/Airways       Drain  Duration             Female External Urinary Catheter w/ Suction 06/23/24 2000 <1 day              Peripheral Intravenous Line  Duration                  Peripheral IV - Single Lumen 06/23/24 1353 20 G Left Antecubital 1 day                     Physical Exam  Constitutional:       Appearance: Normal appearance. She is well-developed.   HENT:      Head: Normocephalic.   Eyes:      Pupils: Pupils are equal, round, and reactive to light.   Cardiovascular:      Rate  and Rhythm: Normal rate and regular rhythm.   Pulmonary:      Effort: Pulmonary effort is normal.      Breath sounds: Normal breath sounds.   Abdominal:      General: Bowel sounds are normal.      Palpations: Abdomen is soft.      Tenderness: There is no abdominal tenderness.   Musculoskeletal:         General: Normal range of motion.      Cervical back: Normal range of motion and neck supple.   Skin:     General: Skin is warm.   Neurological:      Mental Status: She is alert and oriented to person, place, and time.          Significant Labs:       DATA:     Laboratory:  CBC:  Recent Labs   Lab 06/23/24  1408 06/24/24  0514   WBC 7.22 6.06   Hemoglobin 10.2 L 9.6 L   Hematocrit 29.8 L 29.4 L   Platelets 241 221       CHEMISTRIES:  Recent Labs   Lab 06/23/24  1408 06/24/24  0514 06/24/24  1323   Glucose 107 68 L  --    Sodium 139 141  --    Potassium 3.8 3.6  --    BUN 22 20  --    Creatinine 0.9 0.8  --    Calcium 8.7 8.4 L  --    Magnesium  --  1.0 L 2.3       CARDIAC BIOMARKERS:  Recent Labs   Lab 06/23/24  1408 06/23/24  1845 06/23/24  2137   Troponin I <0.006 <0.006 <0.006       COAGS:        LIPIDS/LFTS:  Recent Labs   Lab 06/23/24  1408 06/24/24  0514   AST 17 11   ALT 8 L 7 L       Hemoglobin A1C   Date Value Ref Range Status   05/03/2011 8.9 (H) 4.0 - 6.2 % Final       TSH        The ASCVD Risk score (Readlyn DK, et al., 2019) failed to calculate for the following reasons:    The 2019 ASCVD risk score is only valid for ages 40 to 79       BNP    Lab Results   Component Value Date/Time    BNP 59 06/23/2024 02:08 PM            ECHO    Results for orders placed during the hospital encounter of 06/23/24    Echo    Interpretation Summary    Left Ventricle: The left ventricle is normal in size. Normal wall thickness. There is normal systolic function with a visually estimated ejection fraction of 55 - 60%. Grade I diastolic dysfunction.    Right Ventricle: Normal right ventricular cavity size. Systolic function is  normal.    Left Atrium: Left atrium is moderately dilated.    Right Atrium: Right atrium is mildly dilated.    Aortic Valve: There is mild aortic regurgitation.    Mitral Valve: There is mild regurgitation.    Tricuspid Valve: There is mild regurgitation.    Pulmonary Artery: The estimated pulmonary artery systolic pressure is 8 mmHg.    IVC/SVC: Normal venous pressure at 3 mmHg.      STRESS TEST    Results for orders placed during the hospital encounter of 07/08/21    Nuclear Stress - Cardiology Interpreted    Interpretation Summary    Normal myocardial perfusion scan. There is no evidence of myocardial ischemia or infarction.    The gated perfusion images showed an ejection fraction of 68% post stress.    There is normal wall motion post stress.    The EKG portion of this study is negative for ischemia.    The patient reported no chest pain during the stress test.    There were no arrhythmias during stress.        CATH    No results found for this or any previous visit.      Assessment and Plan:     * Syncope  Patient with orthostatic dizziness.  Orthostatic hypotension.  States she drinks 1 bottle of water a day.  Continue IV hydration.  Compression stockings.  Hold antihypertensives for now.  Once orthostatic dizziness goes away restart at lower dose and titrate up as needed.    No arrhythmias seen on tele monitor.  Continue to monitor.  Event monitor as an outpatient    Coronary artery disease involving native coronary artery of native heart without angina pectoris  Known coronary artery disease.  As described by CTA above.  Subsequent stress test did not show any significant ischemia.  Currently angina free.  Echo done today shows normal left ventricle systolic function.  Continue medical management    Laceration of head        Type 2 diabetes mellitus   Management per primary        VTE Risk Mitigation (From admission, onward)           Ordered     Reason for No Pharmacological VTE Prophylaxis  Once         Question:  Reasons:  Answer:  Risk of Bleeding    06/23/24 1727     IP VTE HIGH RISK PATIENT  Once         06/23/24 1727     Place sequential compression device  Until discontinued         06/23/24 1727                    Thank you for your consult. I will follow-up with patient. Please contact us if you have any additional questions.    Jorden Sumner MD  Cardiology   [facility] Surg

## 2024-06-25 NOTE — CONSULTS
[facility] Surg  Nephrology  Consult Note    Patient Name: Marya Carranza  MRN: [id number]  Admission Date: 6/23/2024  Hospital Length of Stay: 1 days  Attending Provider: Gómez Hines, *   Primary Care Physician: Phillip Levy MD  Principal Problem:Syncope    Consults  Subjective:     HPI: 83 y.o. female with pmhx DM2 and HTN  presented to ER via EMS yesterday c/o syncope. Pt states she passed out after standing from a seated position. She sustained a laceration to posterior scalp post hitting head on the floor. She denies diet change, laxatives, fever, chills, cough, SOB, chest pain, palpitations, diarrhea, or abdominal pain. CT cervical spine shows evidence of T2 compression fracture of uncertain chronicity. Pt admitted for syncope. Nephrology consulted for hyperphosphatemia    Prior records obtained and reviewed. No history of kidney disease or hyperphosphatemia. Phosphate on arrival 5.6. Etiology  possibly volume depletion. Phosphate 4.0 today; improved with  NS IV fluids in ED.     Past Medical History:   Diagnosis Date    Diabetes mellitus     Hypertension        Past Surgical History:   Procedure Laterality Date    HYSTERECTOMY      OOPHORECTOMY         Review of patient's allergies indicates:  No Known Allergies  Current Facility-Administered Medications   Medication Frequency    0.9%  NaCl infusion Continuous    acetaminophen tablet 650 mg Q6H PRN    albuterol-ipratropium 2.5 mg-0.5 mg/3 mL nebulizer solution 3 mL Q4H PRN    aluminum-magnesium hydroxide-simethicone 200-200-20 mg/5 mL suspension 30 mL QID PRN    amLODIPine tablet 5 mg Daily    baclofen tablet 5 mg TID    dextrose 10% bolus 125 mL 125 mL PRN    dextrose 10% bolus 250 mL 250 mL PRN    glucagon (human recombinant) injection 1 mg PRN    glucose chewable tablet 16 g PRN    glucose chewable tablet 24 g PRN    insulin aspart U-100 pen 0-10 Units QID (AC + HS) PRN    melatonin tablet 6 mg Nightly PRN    naloxone 0.4 mg/mL injection  0.02 mg PRN    ondansetron injection 4 mg Q8H PRN    polyethylene glycol packet 17 g Daily    pravastatin tablet 40 mg Daily    prochlorperazine injection Soln 5 mg Q6H PRN    simethicone chewable tablet 80 mg QID PRN    sodium chloride 0.9% flush 10 mL Q8H PRN    traMADoL tablet 50 mg Q6H PRN     Family History       Problem Relation (Age of Onset)    Breast cancer Sister          Tobacco Use    Smoking status: Never    Smokeless tobacco: Never   Substance and Sexual Activity    Alcohol use: Not on file    Drug use: Not on file    Sexual activity: Not on file     Review of Systems   Constitutional:  Negative for chills and fever.   Respiratory:  Negative for cough and shortness of breath.    Cardiovascular:  Negative for chest pain.   Gastrointestinal:  Negative for abdominal pain.   Endocrine: Negative for heat intolerance.   Musculoskeletal:  Negative for back pain.   Skin:  Negative for color change.   Neurological:  Negative for headaches.   Hematological:  Negative for adenopathy.   Psychiatric/Behavioral:  Negative for confusion.      Objective:     Vital Signs (Most Recent):  Temp: 97.6 °F (36.4 °C) (06/25/24 1217)  Pulse: 67 (06/25/24 1217)  Resp: 17 (06/25/24 1217)  BP: (!) 153/65 (06/25/24 1217)  SpO2: (!) 92 % (06/25/24 1217) Vital Signs (24h Range):  Temp:  [97.5 °F (36.4 °C)-98 °F (36.7 °C)] 97.6 °F (36.4 °C)  Pulse:  [54-76] 67  Resp:  [17-20] 17  SpO2:  [92 %-96 %] 92 %  BP: (144-181)/(64-75) 153/65     Weight: 62.1 kg (136 lb 14.5 oz) (06/23/24 1943)  Body mass index is 23.5 kg/m².  Body surface area is 1.67 meters squared.    I/O last 3 completed shifts:  In: 2792.7 [P.O.:1780; I.V.:1012.7]  Out: 2800 [Urine:2800]     Physical Exam  Constitutional:       Appearance: Normal appearance.   HENT:      Head: Normocephalic.   Cardiovascular:      Rate and Rhythm: Normal rate.      Pulses: Normal pulses.   Pulmonary:      Effort: Pulmonary effort is normal. No respiratory distress.   Abdominal:       General: Abdomen is flat.   Musculoskeletal:      Cervical back: Normal range of motion.      Right lower leg: No edema.      Left lower leg: No edema.   Skin:     General: Skin is warm.   Neurological:      General: No focal deficit present.      Mental Status: She is alert.   Psychiatric:         Mood and Affect: Mood normal.          Significant Labs:  CBC:   Recent Labs   Lab 06/25/24 0449   WBC 6.56   RBC 3.49*   HGB 10.1*   HCT 31.1*      MCV 89   MCH 28.9   MCHC 32.5     CMP:   Recent Labs   Lab 06/25/24 0449   *   CALCIUM 8.5*   ALBUMIN 3.4*   PROT 6.9      K 3.6   CO2 26      BUN 12   CREATININE 0.7   ALKPHOS 45*   ALT <5*   AST 11   BILITOT 0.4     PTH:   Recent Labs   Lab 06/25/24 0449   PTH 81.5*     All labs within the past 24 hours have been reviewed.    Significant Imaging:  Labs: Reviewed  ECG: Reviewed  CT: Reviewed  Echo: Reviewed    Assessment/Plan:     Hyperphosphatemia  - Phos 5.6 on arrival, etiology likely volume depletion   - Phos 4.0 today, improved s/p IV fluids  - PTH and Vit D reviewed and acceptable  - ok to d/c from renal standpoint        Thank you for your consult. I will sign off. Please contact us if you have any additional questions.    TARSHA Campos  Nephrology  [facility] - Med Surg

## 2024-06-25 NOTE — PT/OT/SLP EVAL
Physical Therapy Evaluation    Patient Name:  Marya Carranza   MRN:  [id number]    Recommendations:     Discharge Recommendations: Low Intensity Therapy (with caregiver support)   Discharge Equipment Recommendations: walker, rolling   Barriers to discharge: None    Assessment:     Marya Carranza is a 83 y.o. female admitted with a medical diagnosis of Syncope.  She presents with the following impairments/functional limitations: weakness, impaired endurance, impaired sensation, impaired functional mobility, gait instability, impaired balance, decreased lower extremity function, pain, impaired skin.    Rehab Prognosis: Good; patient would benefit from acute skilled PT services to address these deficits and reach maximum level of function.    Recent Surgery: * No surgery found *      Plan:     During this hospitalization, patient to be seen 3 x/week to address the identified rehab impairments via gait training, therapeutic activities, therapeutic exercises and progress toward the following goals:    Plan of Care Expires:  07/09/24    Subjective     Chief Complaint: head pain  Patient/Family Comments/goals: Pt ready to go home today.   Pain/Comfort:  Pain Rating 1:  (Pt c/o pain to posterior head 2* scalp laceration.)  Pain Addressed 1: Nurse notified      Living Environment:  Pt lives with dtr and adult grandson in a [facility] with no concerns at entry.   Prior to admission, patients level of function was independent and driving.  Pt enjoys playing card games.  Equipment used at home: shower chair.  Upon discharge, patient will have assistance from 2 living children.    Objective:     Patient found HOB elevated with peripheral IV, telemetry, PureWick  upon PT entry to room.    General Precautions: Standard, fall, diabetic  Orthopedic Precautions:N/A   Braces: N/A  Respiratory Status: Room air    Exams:  Cognitive Exam:  Patient was able to follow multiple commands.   Gross Motor Coordination:  WFL  Postural Exam:  Patient  presented with the following abnormalities:    -       No postural abnormalities identified  Sensation:    -       Intact  light/touch BLE, h/o neuropathy to B feet with intermittent numbness/paresthesia  Skin Integrity/Edema:      -       Skin integrity: staples intact to scalp laceration  -       Edema: None noted BLE  BLE ROM: WFL  BLE Strength: WFL    Functional Mobility:  Bed Mobility:     Scooting: stand by assistance and contact guard assistance  Supine to Sit: minimum assistance  Transfers:     Sit to Stand: contact guard assistance with rolling walker  Bed to Chair: contact guard assistance with  rolling walker  using  Step Transfer  Toilet Transfer: contact guard assistance with  rolling walker  using  Step Transfer; Pt able to ambulate to the bathroom with assistance.  Pt able to perform toilet hygiene with SBA, new placement of diaper.  Pt able to stop at sink and perform hand washing with SBA.    Gait: Pt ambulated ~200 ft with CGA using RW.  Pt c/o being slightly off balance, no c/o dizziness but some weakness.  Pt with decreased step length and rachel.   Balance: Pt with fair dynamic standing balance.       AM-PAC 6 CLICK MOBILITY  Total Score:19       Treatment & Education:  Pt educated on acute skilled PT services and goals.  Pt to call for nursing assistance with OOB activities while in the hospital.  Pt verbalized good understanding.     Patient left up in chair reclined with all lines intact, call button in reach, and nurse Marilee notified.  Tray table close by.     GOALS:   Multidisciplinary Problems       Physical Therapy Goals          Problem: Physical Therapy    Goal Priority Disciplines Outcome Goal Variances Interventions   Physical Therapy Goal     PT, PT/OT Progressing     Description: Goals to be met by: 24     Patient will increase functional independence with mobility by performin. Supine to sit with Modified [place]  2. Rolling to Left and Right with Modified  [place]  3. Sit to stand transfer with Modified [place]  4. Bed to chair transfer with Modified [place]  5. Gait x500 feet with Modified [place] with or without Rolling Walker   6. Lower extremity exercise program 2 sets x15 reps per handout, with independence                         History:     Past Medical History:   Diagnosis Date    Diabetes mellitus     Hypertension        Past Surgical History:   Procedure Laterality Date    HYSTERECTOMY      OOPHORECTOMY         Time Tracking:     PT Received On: 06/25/24  PT Start Time: 0952     PT Stop Time: 1020  PT Total Time (min): 28 min     Billable Minutes: Evaluation 18 min and Therapeutic Activity 10 min      06/25/2024

## 2024-06-25 NOTE — NURSING
Transported downstairs via wheelchair with belongings & family at side, no distress noted, safety maintained.[facility] [place]  Nurses Note -- 4 Eyes      6/25/2024       Skin assessed on: Discharge      [x] No Pressure Injuries Present    []Prevention Measures Documented    [] Yes LDA  for Pressure Injury Previously documented     [] Yes New Pressure Injury Discovered   [] LDA for New Pressure Injury Added      Attending RN:  Cassy Weir, BITA     Second RN:  NicolasaRN

## 2024-06-25 NOTE — PLAN OF CARE
[facility]      HOME HEALTH ORDERS  FACE TO FACE ENCOUNTER    Patient Name: Marya Carranza  YOB: 1940    PCP: Phillip Levy MD   PCP Address: [address] / [place]  PCP Phone Number: [phone]  PCP Fax: [phone]    Encounter Date: 6/23/24    Admit to Home Health    Diagnoses:  Active Hospital Problems    Diagnosis  POA    *Syncope [R55]  Yes    Laceration of head [S01.91XA]  Yes    Acute respiratory failure with hypoxia [J96.01]  Yes    HLD (hyperlipidemia) [E78.5]  Unknown    Hypertension [I10]  Unknown    Anemia [D64.9]  Yes    Hypomagnesemia [E83.42]  Yes    Hyperphosphatemia [E83.39]  Yes    Abnormal CT scan, cervical spine [R93.7]  Yes    Coronary artery disease involving native coronary artery of native heart without angina pectoris [I25.10]  Yes    Type 2 diabetes mellitus [E11.9]  Yes     Chronic      Resolved Hospital Problems   No resolved problems to display.       Follow Up Appointments:  No future appointments.    Allergies:Review of patient's allergies indicates:  No Known Allergies    Medications: Review discharge medications with patient and family and provide education.    Current Facility-Administered Medications   Medication Dose Route Frequency Provider Last Rate Last Admin    0.9%  NaCl infusion   Intravenous Continuous Joby Jose NP 75 mL/hr at 06/25/24 0146 New Bag at 06/25/24 0146    acetaminophen tablet 650 mg  650 mg Oral Q6H PRN Narayan Mtz Jr., NP   650 mg at 06/24/24 2318    albuterol-ipratropium 2.5 mg-0.5 mg/3 mL nebulizer solution 3 mL  3 mL Nebulization Q4H PRN Narayan Mtz Jr., NP        aluminum-magnesium hydroxide-simethicone 200-200-20 mg/5 mL suspension 30 mL  30 mL Oral QID PRN Narayan Mtz Jr., NP        amLODIPine tablet 5 mg  5 mg Oral Daily Gómez Hines MD        baclofen tablet 5 mg  5 mg Oral TID Gómez Hines MD   5 mg at 06/25/24 0928    dextrose 10% bolus 125 mL 125 mL   12.5 g Intravenous PRN Narayan Mtz Jr., NP        dextrose 10% bolus 250 mL 250 mL  25 g Intravenous PRN Narayan Mtz Jr., NP        glucagon (human recombinant) injection 1 mg  1 mg Intramuscular PRN Narayan Mtz Jr., NP        glucose chewable tablet 16 g  16 g Oral PRN Narayan Mtz Jr., NP        glucose chewable tablet 24 g  24 g Oral PRN Narayan Mtz Jr., NP        insulin aspart U-100 pen 0-10 Units  0-10 Units Subcutaneous QID (AC + HS) PRN Narayan Mtz Jr., NP        melatonin tablet 6 mg  6 mg Oral Nightly PRN Narayan Mtz Jr., NP   6 mg at 06/24/24 2320    naloxone 0.4 mg/mL injection 0.02 mg  0.02 mg Intravenous PRN Narayan Mtz Jr., NP        ondansetron injection 4 mg  4 mg Intravenous Q8H PRN Narayan Mtz Jr., NP        polyethylene glycol packet 17 g  17 g Oral Daily Narayan Mtz Jr., NP        pravastatin tablet 40 mg  40 mg Oral Daily Narayan Mtz Jr., NP   40 mg at 06/25/24 0928    prochlorperazine injection Soln 5 mg  5 mg Intravenous Q6H PRN Narayan Mtz Jr., NP        simethicone chewable tablet 80 mg  1 tablet Oral QID PRN Narayan Mtz Jr., NP        sodium chloride 0.9% flush 10 mL  10 mL Intravenous Q8H PRN Narayan Mtz Jr., NP        traMADoL tablet 50 mg  50 mg Oral Q6H PRN Gómez Hines MD         Current Discharge Medication List        START taking these medications    Details   amLODIPine (NORVASC) 5 MG tablet Take 1 tablet (5 mg total) by mouth once daily.  Qty: 90 tablet, Refills: 0    Comments: .           CONTINUE these medications which have NOT CHANGED    Details   baclofen (LIORESAL) 10 MG tablet Take 0.5 tablets (5 mg total) by mouth 3 (three) times daily.  Qty: 7 tablet, Refills: 0      omeprazole (PRILOSEC) 20 MG capsule Take 20 mg by mouth 2 (two) times daily.      pravastatin (PRAVACHOL) 40 MG tablet Take 40 mg by mouth once daily.  Refills: 3           STOP taking these medications       glipiZIDE (GLUCOTROL) 10 MG  tablet Comments:   Reason for Stopping:         losartan-hydrochlorothiazide 100-25 mg (HYZAAR) 100-25 mg per tablet Comments:   Reason for Stopping:         metFORMIN (GLUCOPHAGE) 1000 MG tablet Comments:   Reason for Stopping:         metoprolol succinate (TOPROL-XL) 100 MG 24 hr tablet Comments:   Reason for Stopping:         metoprolol succinate (TOPROL-XL) 50 MG 24 hr tablet Comments:   Reason for Stopping:                 I have seen and examined this patient within the last 30 days. My clinical findings that support the need for the home health skilled services and home bound status are the following:no   Weakness/numbness causing balance and gait disturbance due to Weakness/Debility making it taxing to leave home.     Diet:   diabetic diet 2000 calorie        Referrals/ Consults  Physical Therapy to evaluate and treat. Evaluate for home safety and equipment needs; Establish/upgrade home exercise program. Perform / instruct on therapeutic exercises, gait training, transfer training, and Range of Motion.  Occupational Therapy to evaluate and treat. Evaluate home environment for safety and equipment needs. Perform/Instruct on transfers, ADL training, ROM, and therapeutic exercises.    Activities:   activity as tolerated    Nursing:   Agency to admit patient within 24 hours of hospital discharge unless specified on physician order or at patient request    SN to complete comprehensive assessment including routine vital signs. Instruct on disease process and s/s of complications to report to MD. Review/verify medication list sent home with the patient at time of discharge  and instruct patient/caregiver as needed. Frequency may be adjusted depending on start of care date.     Skilled nurse to perform up to 3 visits PRN for symptoms related to diagnosis    Notify MD if SBP > 160 or < 90; DBP > 90 or < 50; HR > 120 or < 50; Temp > 101; O2 < 88%; Other:       Ok to schedule additional visits based on staff  availability and patient request on consecutive days within the home health episode.    When multiple disciplines ordered:    Start of Care occurs on Sunday - Wednesday schedule remaining discipline evaluations as ordered on separate consecutive days following the start of care.    Thursday SOC -schedule subsequent evaluations Friday and Monday the following week.     Friday - Saturday SOC - schedule subsequent discipline evaluations on consecutive days starting Monday of the following week.    For all post-discharge communication and subsequent orders please contact patient's primary care physician. If unable to reach primary care physician or do not receive response within 30 minutes, please contact [facility]  for clinical staff order clarification    Miscellaneous   Routine Skin for Bedridden Patients: Instruct patient/caregiver to apply moisture barrier cream to all skin folds and wet areas in perineal area daily and after baths and all bowel movements.    Home Health Aide:  Nursing Twice weekly, Physical Therapy Twice weekly, and Occupational Therapy Twice weekly    Wound Care Orders  no    I certify that this patient is confined to her home and needs intermittent skilled nursing care, physical therapy, and occupational therapy.

## 2024-06-25 NOTE — HPI
Patient is a pleasant 83-year-old lady.  Follows with [facility].  Denies chest pains at rest on exertion, orthopnea, PND.  Past medical history significant for coronary artery disease as detailed below.  Had a coronary CTA done in 2023 which revealed coronary calcifications as well as atherosclerotic coronary artery disease.  Subsequent stress test did not show any significant ischemia.  Patient currently came in with dizziness and syncope.  Syncope occurred when she was sitting and got up and felt dizzy and fell down.  Orthostatics checked today and chest from sitting to standing her systolic pressure dropped 20 mmHg and she got dizzy.  Currently denies any anginal sounding chest pains orthopnea or PND           Loss of Consciousness       82 yo fem to triage via EMS for syncopal episode w/ head injury.Pt has a lac to the back of her head, takes ASA daily. Pt is currently in C-Collar, and bleeding controlled. VSS, NAD, AAOx4    Laceration         HPI: 83 y.o. female with DM2 presents with a complaint of syncope.  Acute onset this morning, after standing from a seated position, associated with lightheadedness and fatigue as well as nausea and vomiting.  Hit head on floor, lac to posterior scalp.  Denies fever, chills, cough, SOB, chest pain, palpitations, diarrhea, or abdominal pain.  In the ED, CT cervical spine shows evidence of T2 compression fracture of uncertain chronicity.  MRI thoracic spice shows only stable chronic changes to the T2 T3 endplates. Otherwise unremarkable for acute abnormality.  Placed in observation.    The Agatston calcium score is 664. This places the patient in the 75th percentile percentile for age and gender matched patients.   The coronary artery calcium breakdown is as follows:     Left main: 0   LAD: 482   LCx: 182   RCA: 0     Myocardium Appearance and Function:     LV EDV: 28     LV ESV: 108%     LV SV: 80%     LV Ejection Fraction. 73%     RV EDV: 45   RV ESV: 97   RV SV: 52    RV Ejection Fraction. 53       Coronary Arteries:   This patient has a left dominant system with with the posterior descending artery arising from the left circumflex.   Coronary Artery Findings.  There are atherosclerotic changes along the course of the left circumflex artery producing areas of up to mild narrowing. There are dense calcifications involving the LAD probably 60-70 percent stenosis proximally and LAD. There are additional mild to moderate multifocal stenoses throughout the more distal branches of the LAD. Atherosclerotic changes along the course of the RCA is demonstrated without evidence for hemodynamically significant narrowing except for perhaps up to mild narrowing at the ostia.     IMPRESSION:     The Agatston calcium score is 664. This places the patient in the 75th percentile percentile for age and gender matched patients.   The coronary artery calcium breakdown is as follows:     Left main: 0   LAD: 482   LCx: 182   RCA: 0     Coronary atherosclerotic changes as discussed above.     Normal ejection fraction.     Mildly elevated cardiothoracic ratio raising the possibility for mild cardiomegaly. No pericardial effusion.     Other findings as discussed above.     Electronically Signed By: Jayme Horowitz MD 3/14/2023 15:47 CDT, [facility]  Exam End: 03/14/23 10:01           Coronary Arteries:   This patient has a left dominant system with with the posterior descending artery arising from the left circumflex.   Coronary Artery Findings.  There are atherosclerotic changes along the course of the left circumflex artery producing areas of up to mild narrowing. There are dense calcifications involving the LAD probably 60-70 percent stenosis proximally and LAD. There are additional mild to moderate multifocal stenoses throughout the more distal branches of the LAD. Atherosclerotic changes along the course of the RCA is demonstrated without evidence for hemodynamically significant  narrowing except for perhaps up to mild narrowing at the ostia.    The Agatston calcium score is 664. This places the patient in the 75th percentile percentile for age and gender matched patients.   The coronary artery calcium breakdown is as follows:     Left main: 0   LAD: 482   LCx: 182   RCA: 0     Coronary atherosclerotic changes as discussed above.     Normal ejection fraction.     Mildly elevated cardiothoracic ratio raising the possibility for mild cardiomegaly. No pericardial effusion.     Other findings as discussed above.     Electronically Signed By: Jayme Horowitz MD 3/14/2023 15:47 CDT, [facility]    Lexiscan-     EF >70%      Normal-no ischemia         Brayden Yost     (Electronically Signed)     Final Date:10 Erika 2024 10:40     CONCLUSIONS     * Left ventricular ejection fraction is estimated at >55 %. Indeterminate diastolic function.      * Normal right ventricular size and systolic function, RVSP 17 mmHg.      * Moderate mitral annular calcification. Thickened mitral valve. Mild mitral valve regurgitation.      * Aortic valve sclerosis. Thickened aortic valve.  Mild aortic valve regurgitation.      * Structurally normal tricuspid valve. Mild tricuspid valve regurgitation.      Other findings as noted above.            Brayden Yost    (Electronically Signed)     Final Date: 21 June 2024 22:09    The left ventricle is normal in size with concentric remodeling and normal systolic function.  Mild left atrial enlargement.  The estimated ejection fraction is 65%.  Grade I left ventricular diastolic dysfunction.  Normal right ventricular size with normal right ventricular systolic function.  Mild aortic regurgitation.  Mild mitral regurgitation.  Normal central venous pressure (3 mmHg).  The estimated PA systolic pressure is 7 mmHg.  There is no pulmonary hypertension.      Normal myocardial perfusion scan. There is no evidence of myocardial ischemia or  infarction.    The gated perfusion images showed an ejection fraction of 68% post stress.    There is normal wall motion post stress.    The EKG portion of this study is negative for ischemia.    The patient reported no chest pain during the stress test.    There were no arrhythmias during stress.      Results for orders placed or performed in visit on 06/15/21   EKG 12-lead    Collection Time: 06/15/21 10:41 AM    Narrative    Test Reason :     Vent. Rate : 067 BPM     Atrial Rate : 067 BPM     P-R Int : 216 ms          QRS Dur : 072 ms      QT Int : 428 ms       P-R-T Axes : 051 -16 074 degrees     QTc Int : 452 ms    Sinus rhythm with 1st degree A-V block  Possible Anterior infarct (cited on or before 11-OCT-2019)  Abnormal ECG  When compared with ECG of 11-OCT-2019 09:56,  No significant change was found  Confirmed by Conrad Bush MD ([id number]) on 6/23/2021 9:22:14 PM    Referred By:  JAMES           Confirmed By:Conrad Bush MD       Results for orders placed during the hospital encounter of 06/23/24    Echo    Interpretation Summary    Left Ventricle: The left ventricle is normal in size. Normal wall thickness. There is normal systolic function with a visually estimated ejection fraction of 55 - 60%. Grade I diastolic dysfunction.    Right Ventricle: Normal right ventricular cavity size. Systolic function is normal.    Left Atrium: Left atrium is moderately dilated.    Right Atrium: Right atrium is mildly dilated.    Aortic Valve: There is mild aortic regurgitation.    Mitral Valve: There is mild regurgitation.    Tricuspid Valve: There is mild regurgitation.    Pulmonary Artery: The estimated pulmonary artery systolic pressure is 8 mmHg.    IVC/SVC: Normal venous pressure at 3 mmHg.      Results for orders placed during the hospital encounter of 07/08/21    Nuclear Stress - Cardiology Interpreted    Interpretation Summary    Normal myocardial perfusion scan. There is no evidence of myocardial ischemia or  infarction.    The gated perfusion images showed an ejection fraction of 68% post stress.    There is normal wall motion post stress.    The EKG portion of this study is negative for ischemia.    The patient reported no chest pain during the stress test.    There were no arrhythmias during stress.      No results found for this or any previous visit.

## 2024-06-25 NOTE — SUBJECTIVE & OBJECTIVE
Past Medical History:   Diagnosis Date    Diabetes mellitus     Hypertension        Past Surgical History:   Procedure Laterality Date    HYSTERECTOMY      OOPHORECTOMY         Review of patient's allergies indicates:  No Known Allergies    No current facility-administered medications on file prior to encounter.     Current Outpatient Medications on File Prior to Encounter   Medication Sig    baclofen (LIORESAL) 10 MG tablet Take 0.5 tablets (5 mg total) by mouth 3 (three) times daily.    glipiZIDE (GLUCOTROL) 10 MG tablet Take 10 mg by mouth 2 (two) times daily.    losartan-hydrochlorothiazide 100-25 mg (HYZAAR) 100-25 mg per tablet Take 1 tablet by mouth once daily.    metFORMIN (GLUCOPHAGE) 1000 MG tablet Take 1,000 mg by mouth 2 (two) times daily with meals.    metoprolol succinate (TOPROL-XL) 100 MG 24 hr tablet Take 100 mg by mouth once daily.    metoprolol succinate (TOPROL-XL) 50 MG 24 hr tablet Take 1 tablet (50 mg total) by mouth once daily.    omeprazole (PRILOSEC) 20 MG capsule Take 20 mg by mouth 2 (two) times daily.    pravastatin (PRAVACHOL) 40 MG tablet Take 40 mg by mouth once daily.     Family History       Problem Relation (Age of Onset)    Breast cancer Sister          Tobacco Use    Smoking status: Never    Smokeless tobacco: Never   Substance and Sexual Activity    Alcohol use: Not on file    Drug use: Not on file    Sexual activity: Not on file     Review of Systems   Constitutional: Negative.   HENT: Negative.     Eyes: Negative.    Cardiovascular:  Positive for near-syncope and syncope.   Respiratory: Negative.     Endocrine: Negative.    Hematologic/Lymphatic: Negative.    Skin: Negative.    Musculoskeletal: Negative.    Gastrointestinal: Negative.    Genitourinary: Negative.    Psychiatric/Behavioral: Negative.     Allergic/Immunologic: Negative.      Objective:     Vital Signs (Most Recent):  Temp: 97.8 °F (36.6 °C) (06/24/24 1922)  Pulse: (!) 59 (06/24/24 1922)  Resp: 20 (06/24/24  1922)  BP: (!) 151/64 (06/24/24 1922)  SpO2: 95 % (06/24/24 1922) Vital Signs (24h Range):  Temp:  [97.6 °F (36.4 °C)-97.9 °F (36.6 °C)] 97.8 °F (36.6 °C)  Pulse:  [59-79] 59  Resp:  [18-20] 20  SpO2:  [95 %-99 %] 95 %  BP: (129-181)/(58-75) 151/64     Weight: 62.1 kg (136 lb 14.5 oz)  Body mass index is 23.5 kg/m².    SpO2: 95 %         Intake/Output Summary (Last 24 hours) at 6/24/2024 1933  Last data filed at 6/24/2024 1904  Gross per 24 hour   Intake 1520.54 ml   Output 2200 ml   Net -679.46 ml       Lines/Drains/Airways       Drain  Duration             Female External Urinary Catheter w/ Suction 06/23/24 2000 <1 day              Peripheral Intravenous Line  Duration                  Peripheral IV - Single Lumen 06/23/24 1353 20 G Left Antecubital 1 day                     Physical Exam  Constitutional:       Appearance: Normal appearance. She is well-developed.   HENT:      Head: Normocephalic.   Eyes:      Pupils: Pupils are equal, round, and reactive to light.   Cardiovascular:      Rate and Rhythm: Normal rate and regular rhythm.   Pulmonary:      Effort: Pulmonary effort is normal.      Breath sounds: Normal breath sounds.   Abdominal:      General: Bowel sounds are normal.      Palpations: Abdomen is soft.      Tenderness: There is no abdominal tenderness.   Musculoskeletal:         General: Normal range of motion.      Cervical back: Normal range of motion and neck supple.   Skin:     General: Skin is warm.   Neurological:      Mental Status: She is alert and oriented to person, place, and time.          Significant Labs:       DATA:     Laboratory:  CBC:  Recent Labs   Lab 06/23/24  1408 06/24/24  0514   WBC 7.22 6.06   Hemoglobin 10.2 L 9.6 L   Hematocrit 29.8 L 29.4 L   Platelets 241 221       CHEMISTRIES:  Recent Labs   Lab 06/23/24  1408 06/24/24  0514 06/24/24  1323   Glucose 107 68 L  --    Sodium 139 141  --    Potassium 3.8 3.6  --    BUN 22 20  --    Creatinine 0.9 0.8  --    Calcium 8.7 8.4 L   --    Magnesium  --  1.0 L 2.3       CARDIAC BIOMARKERS:  Recent Labs   Lab 06/23/24  1408 06/23/24  1845 06/23/24  2137   Troponin I <0.006 <0.006 <0.006       COAGS:        LIPIDS/LFTS:  Recent Labs   Lab 06/23/24  1408 06/24/24  0514   AST 17 11   ALT 8 L 7 L       Hemoglobin A1C   Date Value Ref Range Status   05/03/2011 8.9 (H) 4.0 - 6.2 % Final       TSH        The ASCVD Risk score (Jacqueline DK, et al., 2019) failed to calculate for the following reasons:    The 2019 ASCVD risk score is only valid for ages 40 to 79       BNP    Lab Results   Component Value Date/Time    BNP 59 06/23/2024 02:08 PM            ECHO    Results for orders placed during the hospital encounter of 06/23/24    Echo    Interpretation Summary    Left Ventricle: The left ventricle is normal in size. Normal wall thickness. There is normal systolic function with a visually estimated ejection fraction of 55 - 60%. Grade I diastolic dysfunction.    Right Ventricle: Normal right ventricular cavity size. Systolic function is normal.    Left Atrium: Left atrium is moderately dilated.    Right Atrium: Right atrium is mildly dilated.    Aortic Valve: There is mild aortic regurgitation.    Mitral Valve: There is mild regurgitation.    Tricuspid Valve: There is mild regurgitation.    Pulmonary Artery: The estimated pulmonary artery systolic pressure is 8 mmHg.    IVC/SVC: Normal venous pressure at 3 mmHg.      STRESS TEST    Results for orders placed during the hospital encounter of 07/08/21    Nuclear Stress - Cardiology Interpreted    Interpretation Summary    Normal myocardial perfusion scan. There is no evidence of myocardial ischemia or infarction.    The gated perfusion images showed an ejection fraction of 68% post stress.    There is normal wall motion post stress.    The EKG portion of this study is negative for ischemia.    The patient reported no chest pain during the stress test.    There were no arrhythmias during  stress.        CATH    No results found for this or any previous visit.

## 2024-06-25 NOTE — ASSESSMENT & PLAN NOTE
Patient with orthostatic dizziness.  Orthostatic hypotension.  States she drinks 1 bottle of water a day.  Continue IV hydration.  Compression stockings.  Hold antihypertensives for now.  Once orthostatic dizziness goes away restart at lower dose and titrate up as needed    No arrhythmias seen on tele monitor.  Continue to monitor.  Event monitor as an outpatient      6/25:  No arrhythmias seen.  Dizziness better after IV hydration after holding antihypertensives.  Patient has been told to stay well hydrated.  Compression stockings.  Resume antihypertensives as needed at a lower dose and titrate up as needed.

## 2024-06-25 NOTE — DISCHARGE SUMMARY
[facility]  Discharge Summary      Patient Name: Marya Carranza  MRN: [id number]  [facility]: [id number]  Patient Class: IP- Inpatient  Admission Date: 6/23/2024  Hospital Length of Stay: 1 days  Discharge Date and Time:  06/25/2024 11:38 AM  Attending Physician: Gómez Hines, *   Discharging Provider: Gómez Hines MD  Primary Care Provider: Phillip Levy MD    Primary Care Team: Networked reference to record PCT     HPI:   83 y.o. female with DM2 presents with a complaint of syncope.  Acute onset this morning, after standing from a seated position, associated with lightheadedness and fatigue as well as nausea and vomiting.  Hit head on floor, lac to posterior scalp.  Denies fever, chills, cough, SOB, chest pain, palpitations, diarrhea, or abdominal pain.  In the ED, CT cervical spine shows evidence of T2 compression fracture of uncertain chronicity.  MRI thoracic spice shows only stable chronic changes to the T2 T3 endplates. Otherwise unremarkable for acute abnormality.  Placed in observation.    * No surgery found *      Hospital Course:   Admitted for syncope. CT cervical spine: Age-indeterminate compression deformity of the T2 vertebral body.  MRI of the thoracic spine may be attempted for further evaluation.No evidence of acute fracture or traumatic malalignment of the cervical spine.Calcification of the ligamentum flavum at the C4-C5 and C5-C6 level resulting in severe narrowing of the spinal canal at these levels. Significant retrodental pannus formation resulting in narrowing at the cervicomedullary junction. Extensive calcifications in the neck vessels. MRI thoracic spine: Minimal chronic deformities involving the superior endplate of the T2 and T3 vertebral bodies, in keeping with chronic compression deformities. No acute fracture or traumatic malalignment of the thoracic spine. Consulted cards. Unable to get orthostatic BP. Patient refuses to get up due to  severe dizziness. Echo  show pressed EF. PT/OT eval. Mg replace as needed. Requiring 2L NC secondary to desat 89% on RA. Wean as tolerated. Posterior occiput laceration with staples intact. Neuro checks. Monitor phosphorus due to elevated. Anemia added iron studies. No evidence of bleeding.   Patients symptoms much improved,syncope likely duo to orthostatic hypotension and dehydration,patient  and family member was educated,patient ddi well with PT,OT.  Patient was discharged home with HH and DME and follow up with PCP and cardiology as out patient.          For RW,    The mobility limitation cannot be sufficiently resolved by the use of a cane. Patient's functional mobility deficit can be sufficiently resolved with the use of a (Rolling Walker or Walker). Patient's mobility limitation significantly impairs their ability to participate in one of more activities of daily living. The use of a (RW or Walker) will significantly improve the patient's ability to participate in MRADLS and the patient will use it on regular basis in the home.   Goals of Care Treatment Preferences:  Code Status: Full Code      Consults:   Consults (From admission, onward)          Status Ordering Provider     Inpatient consult to Nephrology  Once        Provider:  Emilee Wilson MD    Acknowledged RAJAN VIRK     Inpatient consult to Cardiology  Once        Provider:  Jorden Sumner MD    Completed RAJAN VIRK            No new Assessment & Plan notes have been filed under this hospital service since the last note was generated.  Service: Hospital Medicine    Final Active Diagnoses:    Diagnosis Date Noted POA    PRINCIPAL PROBLEM:  Syncope [R55] 06/23/2024 Yes    Laceration of head [S01.91XA] 06/24/2024 Yes    Acute respiratory failure with hypoxia [J96.01] 06/24/2024 Yes    HLD (hyperlipidemia) [E78.5] 06/24/2024 Unknown    Hypertension [I10] 06/24/2024 Unknown    Anemia [D64.9] 06/24/2024 Yes    Hypomagnesemia  "[E83.42] 06/24/2024 Yes    Hyperphosphatemia [E83.39] 06/24/2024 Yes    Abnormal CT scan, cervical spine [R93.7] 06/24/2024 Yes    Coronary artery disease involving native coronary artery of native heart without angina pectoris [I25.10] 06/24/2024 Yes    Type 2 diabetes mellitus [E11.9] 02/08/2023 Yes     Chronic      Problems Resolved During this Admission:       Discharged Condition: stable    Disposition: Home-Health Care [facility]    Follow Up:   Follow-up Information       Phillip Levy MD Follow up in 1 week(s).    Specialty: Internal Medicine  Contact information:  [address]  [address]  [place] [address] [phone]                           Patient Instructions:      WALKER FOR HOME USE     Order Specific Question Answer Comments   Type of Walker: Adult (5'4"-6'6")    With wheels? Yes    Height: 5' 4" (1.626 m)    Weight: 62.1 kg (136 lb 14.5 oz)    Length of need (1-99 months): 99    Does patient have medical equipment at home? shower chair    Please check all that apply: Patient's condition impairs ambulation.    Please check all that apply: Patient is unable to safely ambulate without equipment.      Ambulatory referral/consult to Nephrology   Standing Status: Future   Referral Priority: Routine Referral Type: Consultation   Referral Reason: Specialty Services Required   Requested Specialty: Nephrology   Number of Visits Requested: 1     Activity as tolerated       Significant Diagnostic Studies: Labs: BMP:   Recent Labs   Lab 06/23/24  1408 06/24/24  0514 06/24/24  1323 06/25/24  0449    68*  --  126*    141  --  138   K 3.8 3.6  --  3.6    103  --  105   CO2 21* 25  --  26   BUN 22 20  --  12   CREATININE 0.9 0.8  --  0.7   CALCIUM 8.7 8.4*  --  8.5*   MG  --  1.0* 2.3 1.9   , CMP   Recent Labs   Lab 06/23/24  1408 06/24/24  0514 06/25/24  0449    141 138   K 3.8 3.6 3.6    103 105   CO2 21* 25 26    68* 126*   BUN 22 20 12   CREATININE 0.9 0.8 0.7   CALCIUM " 8.7 8.4* 8.5*   PROT 7.8 6.8 6.9   ALBUMIN 4.0 3.3* 3.4*   BILITOT 0.5 0.4 0.4   ALKPHOS 42* 37* 45*   AST 17 11 11   ALT 8* 7* <5*   ANIONGAP 16 13 7*   , and CBC   Recent Labs   Lab 06/23/24  1408 06/24/24  0514 06/25/24  0449   WBC 7.22 6.06 6.56   HGB 10.2* 9.6* 10.1*   HCT 29.8* 29.4* 31.1*    221 223     Radiology: X-Ray: CXR: X-Ray Chest 1 View (CXR): No results found for this visit on 06/23/24. and X-Ray Chest PA and Lateral (CXR): No results found for this visit on 06/23/24.  MRI:thoracic   CT scan: thorasic    Pending Diagnostic Studies:       Procedure Component Value Units Date/Time    Vitamin D [[id number]] Collected: 06/25/24 0448    Order Status: Sent Lab Status: In process Updated: 06/25/24 1028    Specimen: Blood            Medications:  Reconciled Home Medications:      Medication List        START taking these medications      amLODIPine 5 MG tablet  Commonly known as: NORVASC  Take 1 tablet (5 mg total) by mouth once daily.            CONTINUE taking these medications      baclofen 10 MG tablet  Commonly known as: LIORESAL  Take 0.5 tablets (5 mg total) by mouth 3 (three) times daily.     omeprazole 20 MG capsule  Commonly known as: PRILOSEC  Take 20 mg by mouth 2 (two) times daily.     pravastatin 40 MG tablet  Commonly known as: PRAVACHOL  Take 40 mg by mouth once daily.            STOP taking these medications      glipiZIDE 10 MG tablet  Commonly known as: GLUCOTROL     losartan-hydrochlorothiazide 100-25 mg 100-25 mg per tablet  Commonly known as: HYZAAR     metFORMIN 1000 MG tablet  Commonly known as: GLUCOPHAGE     metoprolol succinate 100 MG 24 hr tablet  Commonly known as: TOPROL-XL     metoprolol succinate 50 MG 24 hr tablet  Commonly known as: TOPROL-XL              Indwelling Lines/Drains at time of discharge:   Lines/Drains/Airways       Drain  Duration             Female External Urinary Catheter w/ Suction 06/23/24 2000 1 day                    Time spent on the discharge of  patient:  over 30  minutes         Gómez Hines MD  Department of Hospital Medicine  [facility]

## 2024-06-25 NOTE — PROGRESS NOTES
[facility]  Cardiology  Progress Note    Patient Name: Marya Carranza  MRN: [id number]  Admission Date: 6/23/2024  Hospital Length of Stay: 1 days  Code Status: Full Code   Attending Physician: Gómez Hines, *   Primary Care Physician: Phillip Levy MD  Expected Discharge Date: 6/25/2024  Principal Problem:Syncope    Subjective:       Interval History:  Patient feeling better.  Denies chest pains at rest on exertion, orthopnea, PND.  Dizziness getting better after IV hydration.    Review of Systems   Constitutional: Negative.   HENT: Negative.     Eyes: Negative.    Respiratory: Negative.     Hematologic/Lymphatic: Negative.    Skin: Negative.    Musculoskeletal: Negative.    Gastrointestinal: Negative.    Genitourinary: Negative.    Neurological: Negative.    Psychiatric/Behavioral: Negative.     Allergic/Immunologic: Negative.      Objective:     Vital Signs (Most Recent):  Temp: 97.6 °F (36.4 °C) (06/25/24 1217)  Pulse: 67 (06/25/24 1217)  Resp: 17 (06/25/24 1217)  BP: (!) 153/65 (06/25/24 1217)  SpO2: (!) 92 % (06/25/24 1217) Vital Signs (24h Range):  Temp:  [97.5 °F (36.4 °C)-98 °F (36.7 °C)] 97.6 °F (36.4 °C)  Pulse:  [54-76] 67  Resp:  [17-20] 17  SpO2:  [92 %-96 %] 92 %  BP: (144-181)/(64-75) 153/65     Weight: 62.1 kg (136 lb 14.5 oz)  Body mass index is 23.5 kg/m².     SpO2: (!) 92 %         Intake/Output Summary (Last 24 hours) at 6/25/2024 1417  Last data filed at 6/25/2024 1100  Gross per 24 hour   Intake 2332.7 ml   Output 2900 ml   Net -567.3 ml       Lines/Drains/Airways       None                      Physical Exam  Constitutional:       Appearance: Normal appearance. She is well-developed.   HENT:      Head: Normocephalic.   Eyes:      Pupils: Pupils are equal, round, and reactive to light.   Cardiovascular:      Rate and Rhythm: Normal rate and regular rhythm.   Pulmonary:      Effort: Pulmonary effort is normal.      Breath sounds: Normal breath sounds.   Abdominal:       General: Bowel sounds are normal.      Palpations: Abdomen is soft.      Tenderness: There is no abdominal tenderness.   Musculoskeletal:         General: Normal range of motion.      Cervical back: Normal range of motion and neck supple.   Skin:     General: Skin is warm.   Neurological:      Mental Status: She is alert and oriented to person, place, and time.            Significant Labs:       DATA:     Laboratory:  CBC:  Recent Labs   Lab 06/23/24  1408 06/24/24  0514 06/25/24  0449   WBC 7.22 6.06 6.56   Hemoglobin 10.2 L 9.6 L 10.1 L   Hematocrit 29.8 L 29.4 L 31.1 L   Platelets 241 221 223       CHEMISTRIES:  Recent Labs   Lab 06/23/24  1408 06/24/24  0514 06/24/24  1323 06/25/24  0449   Glucose 107 68 L  --  126 H   Sodium 139 141  --  138   Potassium 3.8 3.6  --  3.6   BUN 22 20  --  12   Creatinine 0.9 0.8  --  0.7   Calcium 8.7 8.4 L  --  8.5 L   Magnesium  --  1.0 L 2.3 1.9       CARDIAC BIOMARKERS:  Recent Labs   Lab 06/23/24  1408 06/23/24  1845 06/23/24  2137   Troponin I <0.006 <0.006 <0.006       COAGS:        LIPIDS/LFTS:  Recent Labs   Lab 06/23/24  1408 06/24/24  0514 06/25/24  0449   AST 17 11 11   ALT 8 L 7 L <5 L       Hemoglobin A1C   Date Value Ref Range Status   05/03/2011 8.9 (H) 4.0 - 6.2 % Final       TSH        The ASCVD Risk score (Jacqueline DK, et al., 2019) failed to calculate for the following reasons:    The 2019 ASCVD risk score is only valid for ages 40 to 79       BNP    Lab Results   Component Value Date/Time    BNP 59 06/23/2024 02:08 PM            ECHO    Results for orders placed during the hospital encounter of 06/23/24    Echo    Interpretation Summary    Left Ventricle: The left ventricle is normal in size. Normal wall thickness. There is normal systolic function with a visually estimated ejection fraction of 55 - 60%. Grade I diastolic dysfunction.    Right Ventricle: Normal right ventricular cavity size. Systolic function is normal.    Left Atrium: Left atrium is moderately  dilated.    Right Atrium: Right atrium is mildly dilated.    Aortic Valve: There is mild aortic regurgitation.    Mitral Valve: There is mild regurgitation.    Tricuspid Valve: There is mild regurgitation.    Pulmonary Artery: The estimated pulmonary artery systolic pressure is 8 mmHg.    IVC/SVC: Normal venous pressure at 3 mmHg.      STRESS TEST    Results for orders placed during the hospital encounter of 07/08/21    Nuclear Stress - Cardiology Interpreted    Interpretation Summary    Normal myocardial perfusion scan. There is no evidence of myocardial ischemia or infarction.    The gated perfusion images showed an ejection fraction of 68% post stress.    There is normal wall motion post stress.    The EKG portion of this study is negative for ischemia.    The patient reported no chest pain during the stress test.    There were no arrhythmias during stress.        CATH    No results found for this or any previous visit.      Assessment and Plan:     Brief HPI:     * Syncope  Patient with orthostatic dizziness.  Orthostatic hypotension.  States she drinks 1 bottle of water a day.  Continue IV hydration.  Compression stockings.  Hold antihypertensives for now.  Once orthostatic dizziness goes away restart at lower dose and titrate up as needed    No arrhythmias seen on tele monitor.  Continue to monitor.  Event monitor as an outpatient      6/25:  No arrhythmias seen.  Dizziness better after IV hydration after holding antihypertensives.  Patient has been told to stay well hydrated.  Compression stockings.  Resume antihypertensives as needed at a lower dose and titrate up as needed.    Coronary artery disease involving native coronary artery of native heart without angina pectoris  Known coronary artery disease.  As described by CTA above.  Subsequent stress test did not show any significant ischemia.  Currently angina free.  Echo done today shows normal left ventricle systolic function.  Continue medical  management    Laceration of head        Type 2 diabetes mellitus   Management per primary        VTE Risk Mitigation (From admission, onward)           Ordered     Reason for No Pharmacological VTE Prophylaxis  Once        Question:  Reasons:  Answer:  Risk of Bleeding    06/23/24 1727     IP VTE HIGH RISK PATIENT  Once         06/23/24 1727     Place sequential compression device  Until discontinued         06/23/24 1727                    Jorden Sumner MD  Cardiology  [facility]

## 2024-06-25 NOTE — PLAN OF CARE
Problem: Occupational Therapy  Goal: Occupational Therapy Goal  Description: Goals to be met by: 7/2/2024     Patient will increase functional independence with ADLs by performing:    UE Dressing with Modified [place].  LE Dressing with Modified [place].  Grooming while standing at sink with Modified [place] and Assistive Devices as needed.  Supine to sit with Modified [place].  Step transfer with Modified [place]  Toilet transfer to toilet with Modified [place].  Upper extremity exercise program x15 reps per handout, with independence.    Outcome: Progressing   The patient will benefit from LIT, RW and a TTB for home.

## 2024-06-25 NOTE — ASSESSMENT & PLAN NOTE
Known coronary artery disease.  As described by CTA above.  Subsequent stress test did not show any significant ischemia.  Currently angina free.  Echo done today shows normal left ventricle systolic function.  Continue medical management

## 2024-06-25 NOTE — NURSING
[facility] [place]  Nurses Note -- 4 Eyes      6/25/2024       Skin assessed on: Q Shift      [x] No Pressure Injuries Present    []Prevention Measures Documented    [] Yes LDA  for Pressure Injury Previously documented     [] Yes New Pressure Injury Discovered   [] LDA for New Pressure Injury Added      Attending RN:  Cassy Weir RN     Second RN:  [email or web address]

## 2024-06-25 NOTE — HPI
83 y.o. female with pmhx DM2 and HTN  presented to ER via EMS yesterday c/o syncope. Pt states she passed out after standing from a seated position. She sustained a laceration to posterior scalp post hitting head on the floor. She denies diet change, laxatives, fever, chills, cough, SOB, chest pain, palpitations, diarrhea, or abdominal pain. CT cervical spine shows evidence of T2 compression fracture of uncertain chronicity. Pt admitted for syncope. Nephrology consulted for hyperphosphatemia    Prior records obtained and reviewed. No history of kidney disease or hyperphosphatemia. Phosphate on arrival 5.6. Etiology  possibly volume depletion. Phosphate 4.0 today; improved with  NS IV fluids in ED.

## 2024-06-25 NOTE — ASSESSMENT & PLAN NOTE
Patient with orthostatic dizziness.  Orthostatic hypotension.  States she drinks 1 bottle of water a day.  Continue IV hydration.  Compression stockings.  Hold antihypertensives for now.  Once orthostatic dizziness goes away restart at lower dose and titrate up as needed    No arrhythmias seen on tele monitor.  Continue to monitor.  Event monitor as an outpatient

## 2024-06-25 NOTE — PLAN OF CARE
Problem: Physical Therapy  Goal: Physical Therapy Goal  Description: Goals to be met by: 24     Patient will increase functional independence with mobility by performin. Supine to sit with Modified Martin  2. Rolling to Left and Right with Modified Martin  3. Sit to stand transfer with Modified Martin  4. Bed to chair transfer with Modified Martin  5. Gait x500 feet with Modified Martin with or without Rolling Walker   6. Lower extremity exercise program 2 sets x15 reps per handout, with independence    Outcome: Progressing     Pt ambulated ~200 ft with CGA using RW.

## 2024-07-19 NOTE — PHYSICIAN QUERY
"Due to the conflicting clinical picture, please clinically validate the Acute respiratory failure with hypoxia .  To respond, click "New Note" select your response from the drop down box complete the required information and sign to complete the query process     The respiratory condition is confirmed. Additional clinical support/decision making indicators for the diagnosis include (please specify): hypoxia  "

## 2024-09-14 ENCOUNTER — ANESTHESIA EVENT (OUTPATIENT)
Dept: SURGERY | Facility: HOSPITAL | Age: 84
End: 2024-09-14
Payer: MEDICARE

## 2024-09-14 ENCOUNTER — HOSPITAL ENCOUNTER (INPATIENT)
Facility: HOSPITAL | Age: 84
LOS: 6 days | Discharge: NURSING, REHAB OR LONG-TERM CARE | DRG: 480 | End: 2024-09-20
Attending: EMERGENCY MEDICINE | Admitting: EMERGENCY MEDICINE
Payer: MEDICARE

## 2024-09-14 DIAGNOSIS — I10 HYPERTENSION, UNSPECIFIED TYPE: ICD-10-CM

## 2024-09-14 DIAGNOSIS — I50.33 ACUTE ON CHRONIC DIASTOLIC HEART FAILURE: ICD-10-CM

## 2024-09-14 DIAGNOSIS — K21.9 GASTROESOPHAGEAL REFLUX: ICD-10-CM

## 2024-09-14 DIAGNOSIS — M25.552 LEFT HIP PAIN: ICD-10-CM

## 2024-09-14 DIAGNOSIS — E87.1 HYPONATREMIA: ICD-10-CM

## 2024-09-14 DIAGNOSIS — E78.5 HYPERLIPIDEMIA, UNSPECIFIED HYPERLIPIDEMIA TYPE: ICD-10-CM

## 2024-09-14 DIAGNOSIS — E83.42 HYPOMAGNESEMIA: ICD-10-CM

## 2024-09-14 DIAGNOSIS — R07.9 CHEST PAIN: ICD-10-CM

## 2024-09-14 DIAGNOSIS — S72.002A CLOSED FRACTURE OF LEFT HIP, INITIAL ENCOUNTER: Primary | ICD-10-CM

## 2024-09-14 DIAGNOSIS — D64.9 ANEMIA, UNSPECIFIED TYPE: ICD-10-CM

## 2024-09-14 PROBLEM — Z71.89 ADVANCED CARE PLANNING/COUNSELING DISCUSSION: Status: ACTIVE | Noted: 2024-09-14

## 2024-09-14 LAB
ALBUMIN SERPL BCP-MCNC: 3.9 G/DL (ref 3.5–5.2)
ALP SERPL-CCNC: 58 U/L (ref 55–135)
ALT SERPL W/O P-5'-P-CCNC: 8 U/L (ref 10–44)
ANION GAP SERPL CALC-SCNC: 14 MMOL/L (ref 8–16)
AST SERPL-CCNC: 16 U/L (ref 10–40)
BASOPHILS # BLD AUTO: 0.03 K/UL (ref 0–0.2)
BASOPHILS NFR BLD: 0.4 % (ref 0–1.9)
BILIRUB SERPL-MCNC: 0.5 MG/DL (ref 0.1–1)
BNP SERPL-MCNC: 54 PG/ML (ref 0–99)
BUN SERPL-MCNC: 18 MG/DL (ref 8–23)
CALCIUM SERPL-MCNC: 9.8 MG/DL (ref 8.7–10.5)
CHLORIDE SERPL-SCNC: 101 MMOL/L (ref 95–110)
CO2 SERPL-SCNC: 23 MMOL/L (ref 23–29)
CREAT SERPL-MCNC: 0.8 MG/DL (ref 0.5–1.4)
DIFFERENTIAL METHOD BLD: ABNORMAL
EOSINOPHIL # BLD AUTO: 0.1 K/UL (ref 0–0.5)
EOSINOPHIL NFR BLD: 1 % (ref 0–8)
ERYTHROCYTE [DISTWIDTH] IN BLOOD BY AUTOMATED COUNT: 13.5 % (ref 11.5–14.5)
EST. GFR  (NO RACE VARIABLE): >60 ML/MIN/1.73 M^2
GLUCOSE SERPL-MCNC: 186 MG/DL (ref 70–110)
HCT VFR BLD AUTO: 32.5 % (ref 37–48.5)
HGB BLD-MCNC: 11.1 G/DL (ref 12–16)
IMM GRANULOCYTES # BLD AUTO: 0.03 K/UL (ref 0–0.04)
IMM GRANULOCYTES NFR BLD AUTO: 0.4 % (ref 0–0.5)
IRON SERPL-MCNC: 48 UG/DL (ref 30–160)
LYMPHOCYTES # BLD AUTO: 1.1 K/UL (ref 1–4.8)
LYMPHOCYTES NFR BLD: 13.3 % (ref 18–48)
MCH RBC QN AUTO: 30 PG (ref 27–31)
MCHC RBC AUTO-ENTMCNC: 34.2 G/DL (ref 32–36)
MCV RBC AUTO: 88 FL (ref 82–98)
MONOCYTES # BLD AUTO: 0.7 K/UL (ref 0.3–1)
MONOCYTES NFR BLD: 8.7 % (ref 4–15)
NEUTROPHILS # BLD AUTO: 6 K/UL (ref 1.8–7.7)
NEUTROPHILS NFR BLD: 76.2 % (ref 38–73)
NRBC BLD-RTO: 0 /100 WBC
PLATELET # BLD AUTO: 294 K/UL (ref 150–450)
PMV BLD AUTO: 9.8 FL (ref 9.2–12.9)
POCT GLUCOSE: 162 MG/DL (ref 70–110)
POCT GLUCOSE: 195 MG/DL (ref 70–110)
POTASSIUM SERPL-SCNC: 4.1 MMOL/L (ref 3.5–5.1)
PROT SERPL-MCNC: 8 G/DL (ref 6–8.4)
RBC # BLD AUTO: 3.7 M/UL (ref 4–5.4)
SATURATED IRON: 9 % (ref 20–50)
SODIUM SERPL-SCNC: 138 MMOL/L (ref 136–145)
TOTAL IRON BINDING CAPACITY: 551 UG/DL (ref 250–450)
TRANSFERRIN SERPL-MCNC: 372 MG/DL (ref 200–375)
TROPONIN I SERPL DL<=0.01 NG/ML-MCNC: <0.006 NG/ML (ref 0–0.03)
WBC # BLD AUTO: 7.91 K/UL (ref 3.9–12.7)

## 2024-09-14 PROCEDURE — 85025 COMPLETE CBC W/AUTO DIFF WBC: CPT | Performed by: EMERGENCY MEDICINE

## 2024-09-14 PROCEDURE — 93005 ELECTROCARDIOGRAM TRACING: CPT

## 2024-09-14 PROCEDURE — 25000003 PHARM REV CODE 250: Performed by: EMERGENCY MEDICINE

## 2024-09-14 PROCEDURE — 83036 HEMOGLOBIN GLYCOSYLATED A1C: CPT | Performed by: EMERGENCY MEDICINE

## 2024-09-14 PROCEDURE — 63600175 PHARM REV CODE 636 W HCPCS: Performed by: STUDENT IN AN ORGANIZED HEALTH CARE EDUCATION/TRAINING PROGRAM

## 2024-09-14 PROCEDURE — 99285 EMERGENCY DEPT VISIT HI MDM: CPT | Mod: 25

## 2024-09-14 PROCEDURE — 25000003 PHARM REV CODE 250: Performed by: STUDENT IN AN ORGANIZED HEALTH CARE EDUCATION/TRAINING PROGRAM

## 2024-09-14 PROCEDURE — 84484 ASSAY OF TROPONIN QUANT: CPT | Performed by: EMERGENCY MEDICINE

## 2024-09-14 PROCEDURE — 63600175 PHARM REV CODE 636 W HCPCS: Performed by: EMERGENCY MEDICINE

## 2024-09-14 PROCEDURE — 21400001 HC TELEMETRY ROOM

## 2024-09-14 PROCEDURE — 80053 COMPREHEN METABOLIC PANEL: CPT | Performed by: EMERGENCY MEDICINE

## 2024-09-14 PROCEDURE — 96374 THER/PROPH/DIAG INJ IV PUSH: CPT

## 2024-09-14 PROCEDURE — 83880 ASSAY OF NATRIURETIC PEPTIDE: CPT | Performed by: EMERGENCY MEDICINE

## 2024-09-14 PROCEDURE — 94761 N-INVAS EAR/PLS OXIMETRY MLT: CPT

## 2024-09-14 PROCEDURE — 93010 ELECTROCARDIOGRAM REPORT: CPT | Mod: ,,, | Performed by: INTERNAL MEDICINE

## 2024-09-14 PROCEDURE — 83540 ASSAY OF IRON: CPT | Performed by: EMERGENCY MEDICINE

## 2024-09-14 RX ORDER — HYDROCODONE BITARTRATE AND ACETAMINOPHEN 5; 325 MG/1; MG/1
1 TABLET ORAL EVERY 6 HOURS PRN
Status: DISCONTINUED | OUTPATIENT
Start: 2024-09-14 | End: 2024-09-15

## 2024-09-14 RX ORDER — AMLODIPINE BESYLATE 5 MG/1
5 TABLET ORAL DAILY
Status: DISCONTINUED | OUTPATIENT
Start: 2024-09-15 | End: 2024-09-15

## 2024-09-14 RX ORDER — IBUPROFEN 200 MG
24 TABLET ORAL
Status: DISCONTINUED | OUTPATIENT
Start: 2024-09-14 | End: 2024-09-20 | Stop reason: HOSPADM

## 2024-09-14 RX ORDER — IBUPROFEN 200 MG
16 TABLET ORAL
Status: DISCONTINUED | OUTPATIENT
Start: 2024-09-14 | End: 2024-09-20 | Stop reason: HOSPADM

## 2024-09-14 RX ORDER — INSULIN ASPART 100 [IU]/ML
0-5 INJECTION, SOLUTION INTRAVENOUS; SUBCUTANEOUS
Status: DISCONTINUED | OUTPATIENT
Start: 2024-09-14 | End: 2024-09-20 | Stop reason: HOSPADM

## 2024-09-14 RX ORDER — ENOXAPARIN SODIUM 100 MG/ML
40 INJECTION SUBCUTANEOUS EVERY 24 HOURS
Status: DISCONTINUED | OUTPATIENT
Start: 2024-09-15 | End: 2024-09-19

## 2024-09-14 RX ORDER — GLUCAGON 1 MG
1 KIT INJECTION
Status: DISCONTINUED | OUTPATIENT
Start: 2024-09-14 | End: 2024-09-20 | Stop reason: HOSPADM

## 2024-09-14 RX ORDER — MORPHINE SULFATE 4 MG/ML
4 INJECTION, SOLUTION INTRAMUSCULAR; INTRAVENOUS
Status: COMPLETED | OUTPATIENT
Start: 2024-09-14 | End: 2024-09-14

## 2024-09-14 RX ORDER — HYDROCODONE BITARTRATE AND ACETAMINOPHEN 10; 325 MG/1; MG/1
1 TABLET ORAL EVERY 4 HOURS PRN
Status: DISCONTINUED | OUTPATIENT
Start: 2024-09-14 | End: 2024-09-15

## 2024-09-14 RX ORDER — ACETAMINOPHEN 325 MG/1
650 TABLET ORAL EVERY 6 HOURS PRN
Status: DISCONTINUED | OUTPATIENT
Start: 2024-09-14 | End: 2024-09-20 | Stop reason: HOSPADM

## 2024-09-14 RX ORDER — ONDANSETRON HYDROCHLORIDE 2 MG/ML
4 INJECTION, SOLUTION INTRAVENOUS EVERY 4 HOURS PRN
Status: DISCONTINUED | OUTPATIENT
Start: 2024-09-14 | End: 2024-09-20 | Stop reason: HOSPADM

## 2024-09-14 RX ORDER — NALOXONE HCL 0.4 MG/ML
0.02 VIAL (ML) INJECTION
Status: DISCONTINUED | OUTPATIENT
Start: 2024-09-14 | End: 2024-09-20 | Stop reason: HOSPADM

## 2024-09-14 RX ORDER — PRAVASTATIN SODIUM 40 MG/1
40 TABLET ORAL DAILY
Status: DISCONTINUED | OUTPATIENT
Start: 2024-09-15 | End: 2024-09-20 | Stop reason: HOSPADM

## 2024-09-14 RX ORDER — SODIUM CHLORIDE 0.9 % (FLUSH) 0.9 %
10 SYRINGE (ML) INJECTION EVERY 12 HOURS PRN
Status: DISCONTINUED | OUTPATIENT
Start: 2024-09-14 | End: 2024-09-20 | Stop reason: HOSPADM

## 2024-09-14 RX ADMIN — MORPHINE SULFATE 4 MG: 4 INJECTION, SOLUTION INTRAMUSCULAR; INTRAVENOUS at 02:09

## 2024-09-14 RX ADMIN — HYDROCODONE BITARTRATE AND ACETAMINOPHEN 1 TABLET: 10; 325 TABLET ORAL at 10:09

## 2024-09-14 RX ADMIN — ONDANSETRON 4 MG: 2 INJECTION INTRAMUSCULAR; INTRAVENOUS at 05:09

## 2024-09-14 RX ADMIN — SODIUM CHLORIDE 500 ML: 9 INJECTION, SOLUTION INTRAVENOUS at 03:09

## 2024-09-14 RX ADMIN — HYDROCODONE BITARTRATE AND ACETAMINOPHEN 1 TABLET: 10; 325 TABLET ORAL at 05:09

## 2024-09-14 NOTE — H&P
"  [facility] Medicine  History & Physical    Patient Name: Marya Carranza  MRN: [id number]  Patient Class: IP- Inpatient  Admission Date: 9/14/2024  Attending Physician: Debbie Ham-Ann Marie ROSALES DO   Primary Care Provider: Phillip Levy MD         Patient information was obtained from patient and ER records.     Subjective:     Principal Problem:Closed fracture of left hip    Chief Complaint:   Chief Complaint   Patient presents with    Fall     Pt reports to ED via E[facility] following a trip and fall over dog, complaining og left hip pain. No shortening or rotation. Pain on movement and palpation, denies LOC or hitting head        HPI:  84 y.o. female, with a PMHx of DM, HTN, HLD presents ED via EMS with complaints of left hip pain after sustaining a fall today.  Stated that she tripped over her dog and fell forward landing on her left hip.  Denies any head injury, loss of consciousness, neck pain, numbness or tingling, or any new weaknesses.  Required help from her family to get back up, but unable to bear weight on her left hip due to the pain.  Denies any anticoagulation.Sister at bedside. Of note, she fell a few weeks ago because she was "dehydrated" and required staples in her head.     In the ED, patient was hypertensive with systolic blood pressure in the 190s.  No hypoxia.  Labs with normocytic anemia, appears to be near baseline.  Left hip x-ray and CT left hip with roximal left femoral fracture.  CT head with no acute intracranial abnormalities.  Chest x-ray with no large focal consolidation.  Orthopedic surgery was consulted in the ED (Dr. Greene spoke with Dr. Caal).  Admitted to hospital medicine for further evaluation and management    Past Medical History:   Diagnosis Date    Diabetes mellitus     Hypertension        Past Surgical History:   Procedure Laterality Date    HYSTERECTOMY      OOPHORECTOMY         Review of patient's allergies indicates:  No Known " Allergies    Current Facility-Administered Medications on File Prior to Encounter   Medication    acetaminophen tablet 650 mg    albuterol inhaler 2 puff    diphenhydrAMINE injection 25 mg    EPINEPHrine (EPIPEN) 0.3 mg/0.3 mL pen injection 0.3 mg    methylPREDNISolone sodium succinate injection 40 mg    ondansetron disintegrating tablet 4 mg    sodium chloride 0.9% 500 mL flush bag    sodium chloride 0.9% flush 10 mL     Current Outpatient Medications on File Prior to Encounter   Medication Sig    amLODIPine (NORVASC) 5 MG tablet Take 1 tablet (5 mg total) by mouth once daily.    baclofen (LIORESAL) 10 MG tablet Take 0.5 tablets (5 mg total) by mouth 3 (three) times daily.    omeprazole (PRILOSEC) 20 MG capsule Take 20 mg by mouth 2 (two) times daily.    pravastatin (PRAVACHOL) 40 MG tablet Take 40 mg by mouth once daily.     Family History       Problem Relation (Age of Onset)    Breast cancer Sister          Tobacco Use    Smoking status: Never    Smokeless tobacco: Never   Substance and Sexual Activity    Alcohol use: Never    Drug use: Never    Sexual activity: Not on file     Review of Systems   Constitutional:  Positive for activity change. Negative for chills, fatigue and fever.   Respiratory:  Negative for cough, chest tightness and shortness of breath.    Cardiovascular:  Negative for chest pain, palpitations and leg swelling.   Gastrointestinal:  Negative for abdominal pain, diarrhea, nausea and vomiting.   Genitourinary:  Negative for difficulty urinating and dysuria.   Musculoskeletal:  Positive for arthralgias and gait problem. Negative for joint swelling.   Neurological:  Positive for dizziness and weakness. Negative for syncope and headaches.   Psychiatric/Behavioral:  Negative for confusion and hallucinations.      Objective:     Vital Signs (Most Recent):  Temp: 97.6 °F (36.4 °C) (09/14/24 1421)  Pulse: 63 (09/14/24 1545)  Resp: 18 (09/14/24 1421)  BP: (!) 151/67 (09/14/24 1508)  SpO2: 100 %  (09/14/24 1421) Vital Signs (24h Range):  Temp:  [96.1 °F (35.6 °C)-97.6 °F (36.4 °C)] 97.6 °F (36.4 °C)  Pulse:  [63-80] 63  Resp:  [13-18] 18  SpO2:  [98 %-100 %] 100 %  BP: (111-190)/(55-70) 151/67     Weight: 63.5 kg (140 lb)  Body mass index is 24.03 kg/m².     Physical Exam  Vitals and nursing note reviewed.   Constitutional:       General: She is not in acute distress.     Appearance: She is not ill-appearing.   HENT:      Nose: Nose normal.      Mouth/Throat:      Mouth: Mucous membranes are moist.   Eyes:      Extraocular Movements: Extraocular movements intact.      Pupils: Pupils are equal, round, and reactive to light.   Cardiovascular:      Rate and Rhythm: Normal rate and regular rhythm.      Heart sounds: No murmur heard.  Pulmonary:      Effort: Pulmonary effort is normal. No respiratory distress.      Breath sounds: Normal breath sounds. No wheezing.   Abdominal:      General: There is no distension.      Palpations: Abdomen is soft.      Tenderness: There is no abdominal tenderness.   Musculoskeletal:         General: Tenderness (left hip pain) present. No swelling.      Right lower leg: No edema.      Left lower leg: No edema.      Comments: ROM limited due to pain    Skin:     General: Skin is warm and dry.   Neurological:      General: No focal deficit present.      Mental Status: She is alert and oriented to person, place, and time.   Psychiatric:         Mood and Affect: Mood normal.         Thought Content: Thought content normal.              CRANIAL NERVES     CN III, IV, VI   Pupils are equal, round, and reactive to light.       Significant Labs: All pertinent labs within the past 24 hours have been reviewed.    CBC:   Recent Labs   Lab 09/14/24  1410   WBC 7.91   HGB 11.1*   HCT 32.5*        CMP:   Recent Labs   Lab 09/14/24  1410      K 4.1      CO2 23   *   BUN 18   CREATININE 0.8   CALCIUM 9.8   PROT 8.0   ALBUMIN 3.9   BILITOT 0.5   ALKPHOS 58   AST 16   ALT  8*   ANIONGAP 14     Cardiac Markers:   Recent Labs   Lab 09/14/24  1410   BNP 54       Recent Labs   Lab 09/14/24  1410   TROPONINI <0.006         Significant Imaging: I have reviewed all pertinent imaging results/findings within the past 24 hours.    Imaging Results              X-Ray Femur 2 View Left (In process)  Result time 09/14/24 16:03:11                     X-Ray Chest AP Portable (Final result)  Result time 09/14/24 14:55:42      Final result by Sony Greene MD (09/14/24 14:55:42)                   Impression:      1. Chronic appearing interstitial findings, no large focal consolidation.      Electronically signed by: Sony Greene MD  Date:    09/14/2024  Time:    14:55               Narrative:    EXAMINATION:  XR CHEST AP PORTABLE    CLINICAL HISTORY:  Chest Pain;    TECHNIQUE:  Single frontal view of the chest was performed.    COMPARISON:  06/23/2024    FINDINGS:  The cardiomediastinal silhouette is not enlarged noting calcification of the aorta..  There is no pleural effusion.  The trachea is midline.  The lungs are symmetrically expanded bilaterally with coarse interstitial attenuation.  No large focal consolidation seen.  There is no pneumothorax.  The osseous structures are remarkable for degenerative change..                                       CT Head Without Contrast (Final result)  Result time 09/14/24 14:11:14      Final result by Sony Greene MD (09/14/24 14:11:14)                   Impression:      1. Allowing for motion artifact, no convincing acute intracranial abnormalities noting sequela of chronic microvascular ischemic change and senescent change.  2. Sinus disease.      Electronically signed by: Sony Greene MD  Date:    09/14/2024  Time:    14:11               Narrative:    EXAMINATION:  CT HEAD WITHOUT CONTRAST    CLINICAL HISTORY:  Head trauma, minor (Age >= 65y);    TECHNIQUE:  Low dose axial images were obtained through the head.  Coronal and sagittal  reformations were also performed. Contrast was not administered.    COMPARISON:  06/23/2024    FINDINGS:  There is motion artifact.    There is generalized cerebral volume loss.  There is hypoattenuation in a periventricular fashion, likely sequela of chronic microvascular ischemic change.There are a few punctate foci of low attenuation within the left basal ganglia, may reflect sequela of remote infarct or prominent perivascular spaces.  There is no evidence of acute major vascular territory infarct, hemorrhage, or mass.  There is no hydrocephalus.  There are no abnormal extra-axial fluid collections.  There is fluid within the left sphenoid sinus and trace opacification of the inferior most left mastoid air cells, otherwise the visualized paranasal sinuses and mastoid air cells are clear, and there is no evidence of calvarial fracture.  The visualized soft tissues are unremarkable.                                       CT Hip Without Contrast Left (Final result)  Result time 09/14/24 14:06:17      Final result by Sony Greene MD (09/14/24 14:06:17)                   Impression:      1. Proximal left femoral fracture as described.      Electronically signed by: Sony Greene MD  Date:    09/14/2024  Time:    14:06               Narrative:    EXAMINATION:  CT HIP WITHOUT CONTRAST LEFT    CLINICAL HISTORY:  Fracture, hip;    TECHNIQUE:  Axial images of the left hip were obtained at 1.25 mm intervals without administration of IV contrast.  Coronal and sagittal reformatted images were reviewed.    COMPARISON:  Radiograph 09/14/2024    FINDINGS:  There is motion artifact.  There is osteopenia.  There is acute appearing impacted fracture involving the base of the left femoral neck, with planes extending to the greater trochanter and intertrochanteric region.  The left femoral head otherwise maintains appropriate relationship with the glenoid.  There are degenerative changes of the pubic symphysis.  The left  sacroiliac joint is intact.  There is edema about the femoral fracture site.  The urinary bladder is distended without wall thickening.  There is vascular calcification.                                       X-Ray Hip 2 or 3 views Left with Pelvis when performed (Final result)  Result time 09/14/24 13:32:00      Final result by Sony Greene MD (09/14/24 13:32:00)                   Impression:      1. Proximal left femoral fracture as described.      Electronically signed by: Sony Greene MD  Date:    09/14/2024  Time:    13:32               Narrative:    EXAMINATION:  XR HIP WITH PELVIS WHEN PERFORMED 2 OR 3 VIEWS LEFT    CLINICAL HISTORY:  Pain in left hip    TECHNIQUE:  AP view of the pelvis and frog leg lateral view of the left hip were performed.    COMPARISON:  None    FINDINGS:  Two views left hip.    There is fracture involving the left femoral neck at the level of the trochanter.  On the oblique view, fracture planes appear to involve the intertrochanteric region.  No left femoroacetabular dislocation.  The sacroiliac joints are intact.  The right femoroacetabular joint is intact.  There is vascular calcification.                                      Assessment/Plan:     * Closed fracture of left hip  -presented with left hip pain after a mechanical fall (she tripped over her puppy).   -Left hip x-ray and CT left hip with roximal left femoral fracture.    -CT head with no acute intracranial abnormalities.    -Chest x-ray with no large focal consolidation.    -Orthopedic surgery was consulted  -NPO at [facility]    Hypertension  Chronic, controlled.   -resume home amlodipine with hold paramters    Latest blood pressure and vitals reviewed-     Temp:  [96.1 °F (35.6 °C)-97.6 °F (36.4 °C)]   Pulse:  [76-80]   Resp:  [13-18]   BP: (111-190)/(55-70)   SpO2:  [98 %-100 %] .   Home meds for hypertension were reviewed and noted below.   Hypertension Medications               amLODIPine (NORVASC) 5 MG tablet Take 1  "tablet (5 mg total) by mouth once daily.            While in the hospital, will manage blood pressure as follows; Continue home antihypertensive regimen    Will utilize p.r.n. blood pressure medication only if patient's blood pressure greater than 180/110 and she develops symptoms such as worsening chest pain or shortness of breath.    Type 2 diabetes mellitus  Patient's FSGs are controlled on current medication regimen.  Last A1c reviewed-   Lab Results   Component Value Date    HGBA1C 8.9 (H) 05/03/2011     Most recent fingerstick glucose reviewed- No results for input(s): "POCTGLUCOSE" in the last 24 hours.  Current correctional scale  Low  Maintain anti-hyperglycemic dose as follows-   Antihyperglycemics (From admission, onward)      Start     Stop Route Frequency Ordered    09/14/24 1608  insulin aspart U-100 pen 0-5 Units         -- SubQ Before meals & nightly PRN 09/14/24 1509          Hold Oral hypoglycemics while patient is in the hospital.  Repeat A1C  Meds: SSI PRN to maintain goal 140-180  ADA diet when appropriate, accuchecks ACHS, hypoglycemic protocol      HLD (hyperlipidemia)  -resume home pravastatin      Anemia  Anemia is likely due to Iron deficiency. Most recent hemoglobin and hematocrit are listed below.  Recent Labs     09/14/24  1410   HGB 11.1*   HCT 32.5*     Plan  - Monitor serial CBC: Daily  - Transfuse PRBC if patient becomes hemodynamically unstable, symptomatic or H/H drops below 7/21.  - Patient has not received any PRBC transfusions to date  - Patient's anemia is currently stable  - monitor. Ordered iron profile    Advanced care planning/counseling discussion  Advance Care Planning    Date: 09/14/2024    Code Status  I engaged the the patient in a voluntary conversation about the patient's preferences for care  at the very end of life. The patient wishes to have CPR or other invasive treatments performed when her heart and/or breathing stops. I communicated to the patient that her " wishes align with full code status and she agrees. Daughter at bedside and present for discussion.     A total of 16 min was spent on advance care planning, goals of care discussion, emotional support, formulating and communicating prognosis and exploring burden/benefit of various approaches of treatment. This discussion occurred on a fully voluntary basis with the verbal consent of the patient and/or family.              VTE Risk Mitigation (From admission, onward)           Ordered     enoxaparin injection 40 mg  Daily         09/14/24 1509     IP VTE HIGH RISK PATIENT  Once         09/14/24 1509     Place sequential compression device  Until discontinued         09/14/24 1509                                    Dick Ham DO  Department of Hospital Medicine  [facility] [place] - Emergency Dept

## 2024-09-14 NOTE — ANESTHESIA PREPROCEDURE EVALUATION
09/14/2024  Marya Carranza is a 84 y.o., female.      Pre-op Assessment    I have reviewed the Patient Summary Reports.     I have reviewed the Nursing Notes. I have reviewed the NPO Status.   I have reviewed the Medications.     Review of Systems  Anesthesia Hx:  No problems with previous Anesthesia             Denies Family Hx of Anesthesia complications.    Denies Personal Hx of Anesthesia complications.                    Social:  Non-Smoker, No Alcohol Use       Hematology/Oncology:       -- Anemia:                                  Cardiovascular:     Hypertension   CAD           hyperlipidemia    06/24 stress: negative for ischemia    Echo:  eft Ventricle: The left ventricle is normal in size. Normal wall thickness. There is normal systolic function with a visually estimated ejection fraction of 55 - 60%. Grade I diastolic dysfunction.  ·  Right Ventricle: Normal right ventricular cavity size. Systolic function is normal.  ·  Left Atrium: Left atrium is moderately dilated.  ·  Right Atrium: Right atrium is mildly dilated.  ·  Aortic Valve: There is mild aortic regurgitation.  ·  Mitral Valve: There is mild regurgitation.  ·  Tricuspid Valve: There is mild regurgitation.  ·  Pulmonary Artery: The estimated pulmonary artery systolic pressure is 8 mmHg.  ·  IVC/SVC: Normal venous pressure at 3 mmHg.                           Pulmonary:  Pulmonary Normal                       Renal/:  Renal/ Normal                 Hepatic/GI:  Hepatic/GI Normal                 Musculoskeletal:     Left hip fracture 2/2 tripping/falling            Endocrine:  Diabetes, type 2           Psych:  Psychiatric Normal                    Physical Exam  General: Well nourished, Cooperative, Alert and Oriented    Airway:  Mallampati: II   Mouth Opening: Normal  TM Distance: Normal  Neck ROM: Normal  ROM    Dental:  Edentulous      Wt Readings from Last 3 Encounters:   09/14/24 63.5 kg (140 lb)   06/23/24 62.1 kg (136 lb 14.5 oz)   09/22/22 63.5 kg (140 lb)     Temp Readings from Last 3 Encounters:   09/14/24 36.4 °C (97.6 °F) (Oral)   06/25/24 36.4 °C (97.6 °F) (Oral)   09/22/22 36.6 °C (97.8 °F) (Oral)     BP Readings from Last 3 Encounters:   09/14/24 (!) 151/67   06/25/24 (!) 153/65   09/22/22 (!) 194/76     Pulse Readings from Last 3 Encounters:   09/14/24 63   06/25/24 67   09/22/22 69     Lab Results   Component Value Date    WBC 7.91 09/14/2024    HGB 11.1 (L) 09/14/2024    HCT 32.5 (L) 09/14/2024    MCV 88 09/14/2024     09/14/2024       CMP  Sodium   Date Value Ref Range Status   09/14/2024 138 136 - 145 mmol/L Final     Potassium   Date Value Ref Range Status   09/14/2024 4.1 3.5 - 5.1 mmol/L Final     Chloride   Date Value Ref Range Status   09/14/2024 101 95 - 110 mmol/L Final     CO2   Date Value Ref Range Status   09/14/2024 23 23 - 29 mmol/L Final     Glucose   Date Value Ref Range Status   09/14/2024 186 (H) 70 - 110 mg/dL Final     BUN   Date Value Ref Range Status   09/14/2024 18 8 - 23 mg/dL Final     Creatinine   Date Value Ref Range Status   09/14/2024 0.8 0.5 - 1.4 mg/dL Final     Calcium   Date Value Ref Range Status   09/14/2024 9.8 8.7 - 10.5 mg/dL Final     Total Protein   Date Value Ref Range Status   09/14/2024 8.0 6.0 - 8.4 g/dL Final     Albumin   Date Value Ref Range Status   09/14/2024 3.9 3.5 - 5.2 g/dL Final     Total Bilirubin   Date Value Ref Range Status   09/14/2024 0.5 0.1 - 1.0 mg/dL Final     Comment:     For infants and newborns, interpretation of results should be based  on gestational age, weight and in agreement with clinical  observations.    Premature Infant recommended reference ranges:  Up to 24 hours.............<8.0 mg/dL  Up to 48 hours............<12.0 mg/dL  3-5 days..................<15.0 mg/dL  6-29 days.................<15.0 mg/dL       Alkaline  Phosphatase   Date Value Ref Range Status   09/14/2024 58 55 - 135 U/L Final     AST   Date Value Ref Range Status   09/14/2024 16 10 - 40 U/L Final     ALT   Date Value Ref Range Status   09/14/2024 8 (L) 10 - 44 U/L Final     Anion Gap   Date Value Ref Range Status   09/14/2024 14 8 - 16 mmol/L Final     eGFR   Date Value Ref Range Status   09/14/2024 >60 >60 mL/min/1.73 m^2 Final         Anesthesia Plan  Type of Anesthesia, risks & benefits discussed:    Anesthesia Type: Gen ETT, Spinal  Intra-op Monitoring Plan: Standard ASA Monitors  Informed Consent: Informed consent signed with the Patient and all parties understand the risks and agree with anesthesia plan.  All questions answered. Patient consented to blood products? No  ASA Score: 3    Ready For Surgery From Anesthesia Perspective.     .

## 2024-09-14 NOTE — HPI
" 84 y.o. female, with a PMHx of DM, HTN, HLD presents ED via EMS with complaints of left hip pain after sustaining a fall today.  Stated that she tripped over her dog and fell forward landing on her left hip.  Denies any head injury, loss of consciousness, neck pain, numbness or tingling, or any new weaknesses.  Required help from her family to get back up, but unable to bear weight on her left hip due to the pain.  Denies any anticoagulation.Sister at bedside. Of note, she fell a few weeks ago because she was "dehydrated" and required staples in her head.     In the ED, patient was hypertensive with systolic blood pressure in the 190s.  No hypoxia.  Labs with normocytic anemia, appears to be near baseline.  Left hip x-ray and CT left hip with roximal left femoral fracture.  CT head with no acute intracranial abnormalities.  Chest x-ray with no large focal consolidation.  Orthopedic surgery was consulted in the ED (Dr. Greene spoke with Dr. Caal).  Admitted to hospital medicine for further evaluation and management  "

## 2024-09-14 NOTE — ED TRIAGE NOTES
Pt to ED reporting trip and fall after tripping over her dog. Pt denies hitting head. Pt c/o left sided hip pain, says she landed on hip when she fell. Pt has no other complaints.

## 2024-09-14 NOTE — ASSESSMENT & PLAN NOTE
-presented with left hip pain after a mechanical fall (she tripped over her puppy).   -Left hip x-ray and CT left hip with roximal left femoral fracture.    -CT head with no acute intracranial abnormalities.    -Chest x-ray with no large focal consolidation.    -Orthopedic surgery was consulted  -NPO at [facility]

## 2024-09-14 NOTE — PLAN OF CARE
Case Management Assessment     PCP: Phillip Levy  Pharmacy: [facility]    Patient Arrived From: home   Existing Help at Home: daughter    Barriers to Discharge: none    Discharge Plan:    A. Home with family   B. Home with family    SW completed initial assessment and discussed discharge planning with patient at and her daughter Marni at her bedside. Patient lives with her daughter. Patient uses rolling walker. Patient's daughter will provide transportation for her to get home when discharge from the hospital.      09/14/24 1614   Discharge Assessment   Assessment Type Discharge Planning Assessment   Confirmed/corrected address, phone number and insurance Yes   Confirmed Demographics Correct on Facesheet   Source of Information patient   Communicated OSCAR with patient/caregiver Date not available/Unable to determine   Reason For Admission Closed fractureof left hip   People in Home child(arielle), adult   Do you expect to return to your current living situation? Yes   Do you have help at home or someone to help you manage your care at home? Yes   Who are your caregiver(s) and their phone number(s)? Marni Carranza (Daughter)  [phone] (Mobile)   Prior to hospitilization cognitive status: Alert/Oriented   Current cognitive status: Alert/Oriented   Walking or Climbing Stairs Difficulty no   Dressing/Bathing Difficulty no   Equipment Currently Used at Home walker, rolling   Readmission within 30 days? No   Patient currently being followed by outpatient case management? No   Do you currently have service(s) that help you manage your care at home? No   Do you take prescription medications? Yes   Do you have prescription coverage? Yes   Coverage PHN   Do you have any problems affording any of your prescribed medications? No   Is the patient taking medications as prescribed? yes   Who is going to help you get home at discharge? Marni Carranza (Daughter)  [phone] (Mobile)   How do you get to doctors  appointments? family or friend will provide   Are you on dialysis? No   Do you take coumadin? No   Discharge Plan A Home with family   Discharge Plan B Home with family   DME Needed Upon Discharge  none   Discharge Plan discussed with: Patient;Adult children   Transition of Care Barriers None   OTHER   Name(s) of People in Home Marni Carranza (Daughter)  [phone] (Mobile)

## 2024-09-14 NOTE — ASSESSMENT & PLAN NOTE
Advance Care Planning     Date: 09/14/2024    Code Status  I engaged the the patient in a voluntary conversation about the patient's preferences for care  at the very end of life. The patient wishes to have CPR or other invasive treatments performed when her heart and/or breathing stops. I communicated to the patient that her wishes align with full code status and she agrees. Daughter at bedside and present for discussion.     A total of 16 min was spent on advance care planning, goals of care discussion, emotional support, formulating and communicating prognosis and exploring burden/benefit of various approaches of treatment. This discussion occurred on a fully voluntary basis with the verbal consent of the patient and/or family.

## 2024-09-14 NOTE — ASSESSMENT & PLAN NOTE
"Patient's FSGs are controlled on current medication regimen.  Last A1c reviewed-   Lab Results   Component Value Date    HGBA1C 8.9 (H) 05/03/2011     Most recent fingerstick glucose reviewed- No results for input(s): "POCTGLUCOSE" in the last 24 hours.  Current correctional scale  Low  Maintain anti-hyperglycemic dose as follows-   Antihyperglycemics (From admission, onward)      Start     Stop Route Frequency Ordered    09/14/24 1608  insulin aspart U-100 pen 0-5 Units         -- SubQ Before meals & nightly PRN 09/14/24 1509          Hold Oral hypoglycemics while patient is in the hospital.  Repeat A1C  Meds: SSI PRN to maintain goal 140-180  ADA diet when appropriate, accuchecks ACHS, hypoglycemic protocol    "

## 2024-09-14 NOTE — NURSING
[facility], [facility]  Nurses Note -- 4 Eyes      9/14/2024       Skin assessed on: Q Shift      [x] No Pressure Injuries Present    [x]Prevention Measures Documented  Sacrum bruised/discolored  Elbow/Arms Bruising  Bilat feet/heels dry intact    [] Yes LDA  for Pressure Injury Previously documented     [] Yes New Pressure Injury Discovered   [] LDA for New Pressure Injury Added      Attending RN:  Dannielle Richards LPN     Second RN:  Lyndsey SUNSHINE

## 2024-09-14 NOTE — ASSESSMENT & PLAN NOTE
Chronic, controlled.   -resume home amlodipine with hold paramters    Latest blood pressure and vitals reviewed-     Temp:  [96.1 °F (35.6 °C)-97.6 °F (36.4 °C)]   Pulse:  [76-80]   Resp:  [13-18]   BP: (111-190)/(55-70)   SpO2:  [98 %-100 %] .   Home meds for hypertension were reviewed and noted below.   Hypertension Medications               amLODIPine (NORVASC) 5 MG tablet Take 1 tablet (5 mg total) by mouth once daily.            While in the hospital, will manage blood pressure as follows; Continue home antihypertensive regimen    Will utilize p.r.n. blood pressure medication only if patient's blood pressure greater than 180/110 and she develops symptoms such as worsening chest pain or shortness of breath.

## 2024-09-14 NOTE — ED PROVIDER NOTES
Encounter Date: 9/14/2024    SCRIBE #1 NOTE: ILachelle, am scribing for, and in the presence of,  Bernabe Greene MD. I have scribed the following portions of the note - Other sections scribed: HPI.       History     Chief Complaint   Patient presents with    Fall     Pt reports to ED via Ems following a trip and fall over dog, complaining og left hip pain. No shortening or rotation. Pain on movement and palpation, denies LOC or hitting head     Marya Carranza is a 84 y.o. female, with a PMHx of DM, HTN, HLD, who presents to the ED bib EMS with left hip pain secondary to a trip and fall that occurred earlier today. Patient reports tripping over her puppy and falling onto her left hip. She denies hitting her head, neck, or sustaining any other injuries. States her grandsons helped her up, but she was unable to bear weight or move her left hip d/t the pain. Notes that she ambulates with a walker, but typically uses it when she leaves her home, and did not use it today. No other exacerbating or alleviating factors. Denies headache neck pain, weakness, numbness, syncope, chest pain, dyspnea, nausea, vomiting, or other associated symptoms. Patient denies anticoagulant use.       The history is provided by the patient. No  was used.     Review of patient's allergies indicates:  No Known Allergies  Past Medical History:   Diagnosis Date    Diabetes mellitus     Hypertension      Past Surgical History:   Procedure Laterality Date    HYSTERECTOMY      INTRAMEDULLARY RODDING OF FEMUR Left 9/15/2024    Procedure: INSERTION, INTRAMEDULLARY GONZALEZ, FEMUR;  Surgeon: Jose Alberto Caal MD;  Location: [facility];  Service: Orthopedics;  Laterality: Left;    OOPHORECTOMY       Family History   Problem Relation Name Age of Onset    Breast cancer Sister       Social History     Tobacco Use    Smoking status: Never    Smokeless tobacco: Never   Substance Use Topics    Alcohol use: Never    Drug use: Never      Review of Systems   Musculoskeletal:  Positive for arthralgias.       Physical Exam     Initial Vitals [09/14/24 1225]   BP Pulse Resp Temp SpO2   (!) 190/70 76 13 96.1 °F (35.6 °C) 98 %      MAP       --         Physical Exam    Nursing note and vitals reviewed.  Constitutional: She appears well-developed and well-nourished. She is not diaphoretic. No distress.   HENT:   Head: Normocephalic and atraumatic.   Nose: Nose normal.   Eyes: EOM are normal. Pupils are equal, round, and reactive to light. No scleral icterus.   Neck: Neck supple.   Normal range of motion.  Cardiovascular:  Normal rate, regular rhythm, normal heart sounds and intact distal pulses.     Exam reveals no gallop and no friction rub.       No murmur heard.  Pulmonary/Chest: Breath sounds normal. No stridor. No respiratory distress. She has no wheezes. She has no rhonchi. She has no rales.   Abdominal: Abdomen is soft. Bowel sounds are normal. She exhibits no distension. There is no abdominal tenderness. There is no rebound and no guarding.   Musculoskeletal:         General: Tenderness present. No edema.      Cervical back: Normal range of motion and neck supple.     Neurological: She is oriented to person, place, and time. No cranial nerve deficit.   Skin: Skin is warm and dry. No rash noted.   Psychiatric: She has a normal mood and affect. Her behavior is normal.         ED Course   Procedures  Labs Reviewed   CBC W/ AUTO DIFFERENTIAL - Abnormal       Result Value    WBC 7.91      RBC 3.70 (*)     Hemoglobin 11.1 (*)     Hematocrit 32.5 (*)     MCV 88      MCH 30.0      MCHC 34.2      RDW 13.5      Platelets 294      MPV 9.8      Immature Granulocytes 0.4      Gran # (ANC) 6.0      Immature Grans (Abs) 0.03      Lymph # 1.1      Mono # 0.7      Eos # 0.1      Baso # 0.03      nRBC 0      Gran % 76.2 (*)     Lymph % 13.3 (*)     Mono % 8.7      Eosinophil % 1.0      Basophil % 0.4      Differential Method Automated     COMPREHENSIVE METABOLIC  PANEL - Abnormal    Sodium 138      Potassium 4.1      Chloride 101      CO2 23      Glucose 186 (*)     BUN 18      Creatinine 0.8      Calcium 9.8      Total Protein 8.0      Albumin 3.9      Total Bilirubin 0.5      Alkaline Phosphatase 58      AST 16      ALT 8 (*)     eGFR >60      Anion Gap 14     IRON AND TIBC - Abnormal    Iron 48      Transferrin 372      TIBC 551 (*)     Saturated Iron 9 (*)    POCT GLUCOSE - Abnormal    POCT Glucose 162 (*)    TROPONIN I    Troponin I <0.006     B-TYPE NATRIURETIC PEPTIDE    BNP 54     IRON AND TIBC   HEMOGLOBIN A1C   IRON     EKG Readings: (Independently Interpreted)   Initial Reading: No STEMI. Rhythm: Normal Sinus Rhythm. Heart Rate: 63. Ectopy: No Ectopy.   No ST segment elevation or depression concerning for acute ischemia.        ECG Results              EKG 12-lead (Final result)        Collection Time Result Time QRS Duration OHS QTC Calculation    09/14/24 14:18:01 09/15/24 19:12:11 68 452                     Final result by Interface, Lab In [facility] (09/15/24 19:12:19)                   Narrative:    Test Reason : R07.9,    Vent. Rate : 063 BPM     Atrial Rate : 063 BPM     P-R Int : 230 ms          QRS Dur : 068 ms      QT Int : 442 ms       P-R-T Axes : 063 009 058 degrees     QTc Int : 452 ms    Sinus rhythm with 1st degree A-V block  Anterior infarct (cited on or before 23-JUN-2024)  Abnormal ECG  When compared with ECG of 23-JUN-2024 14:12,  No significant change was found    Confirmed by Conrad Bush MD ([id number]) on 9/15/2024 7:12:09 PM    Referred By: [email or web address]   SELF           Confirmed By:Conrad Bush MD                                     EKG 12-lead (Final result)  Result time 09/16/24 12:41:31      Final result by Unknown User (09/16/24 12:41:31)                                      Imaging Results              X-Ray Femur 2 View Left (Final result)  Result time 09/14/24 16:04:03      Final result by Sony Greene MD (09/14/24 16:04:03)                    Impression:      1. Proximal femoral fracture as described.      Electronically signed by: Sony Greene MD  Date:    09/14/2024  Time:    16:04               Narrative:    EXAMINATION:  XR FEMUR 2 VIEW LEFT    CLINICAL HISTORY:  hip fracture;    TECHNIQUE:  AP and lateral views of the left femur were performed.    COMPARISON:  None}    FINDINGS:  Four views left femur.    There is fracture involving the femoral neck at the superior margin of the intertrochanteric region noting some impaction.  The left femoral head maintains appropriate relationship with the acetabulum.  There is vascular calcification.  There is osteopenia.  Left knee arthroplasty appears intact.  No large knee joint effusion.                                       X-Ray Chest AP Portable (Final result)  Result time 09/14/24 14:55:42      Final result by Sony Greene MD (09/14/24 14:55:42)                   Impression:      1. Chronic appearing interstitial findings, no large focal consolidation.      Electronically signed by: Sony Greene MD  Date:    09/14/2024  Time:    14:55               Narrative:    EXAMINATION:  XR CHEST AP PORTABLE    CLINICAL HISTORY:  Chest Pain;    TECHNIQUE:  Single frontal view of the chest was performed.    COMPARISON:  06/23/2024    FINDINGS:  The cardiomediastinal silhouette is not enlarged noting calcification of the aorta..  There is no pleural effusion.  The trachea is midline.  The lungs are symmetrically expanded bilaterally with coarse interstitial attenuation.  No large focal consolidation seen.  There is no pneumothorax.  The osseous structures are remarkable for degenerative change..                                       CT Head Without Contrast (Final result)  Result time 09/14/24 14:11:14      Final result by Sony Greene MD (09/14/24 14:11:14)                   Impression:      1. Allowing for motion artifact, no convincing acute intracranial abnormalities noting sequela  of chronic microvascular ischemic change and senescent change.  2. Sinus disease.      Electronically signed by: Sony Greene MD  Date:    09/14/2024  Time:    14:11               Narrative:    EXAMINATION:  CT HEAD WITHOUT CONTRAST    CLINICAL HISTORY:  Head trauma, minor (Age >= 65y);    TECHNIQUE:  Low dose axial images were obtained through the head.  Coronal and sagittal reformations were also performed. Contrast was not administered.    COMPARISON:  06/23/2024    FINDINGS:  There is motion artifact.    There is generalized cerebral volume loss.  There is hypoattenuation in a periventricular fashion, likely sequela of chronic microvascular ischemic change.There are a few punctate foci of low attenuation within the left basal ganglia, may reflect sequela of remote infarct or prominent perivascular spaces.  There is no evidence of acute major vascular territory infarct, hemorrhage, or mass.  There is no hydrocephalus.  There are no abnormal extra-axial fluid collections.  There is fluid within the left sphenoid sinus and trace opacification of the inferior most left mastoid air cells, otherwise the visualized paranasal sinuses and mastoid air cells are clear, and there is no evidence of calvarial fracture.  The visualized soft tissues are unremarkable.                                       CT Hip Without Contrast Left (Final result)  Result time 09/14/24 14:06:17      Final result by Sony Greene MD (09/14/24 14:06:17)                   Impression:      1. Proximal left femoral fracture as described.      Electronically signed by: Sony Greene MD  Date:    09/14/2024  Time:    14:06               Narrative:    EXAMINATION:  CT HIP WITHOUT CONTRAST LEFT    CLINICAL HISTORY:  Fracture, hip;    TECHNIQUE:  Axial images of the left hip were obtained at 1.25 mm intervals without administration of IV contrast.  Coronal and sagittal reformatted images were reviewed.    COMPARISON:  Radiograph  09/14/2024    FINDINGS:  There is motion artifact.  There is osteopenia.  There is acute appearing impacted fracture involving the base of the left femoral neck, with planes extending to the greater trochanter and intertrochanteric region.  The left femoral head otherwise maintains appropriate relationship with the glenoid.  There are degenerative changes of the pubic symphysis.  The left sacroiliac joint is intact.  There is edema about the femoral fracture site.  The urinary bladder is distended without wall thickening.  There is vascular calcification.                                       X-Ray Hip 2 or 3 views Left with Pelvis when performed (Final result)  Result time 09/14/24 13:32:00      Final result by Sony Gerene MD (09/14/24 13:32:00)                   Impression:      1. Proximal left femoral fracture as described.      Electronically signed by: Sony Greene MD  Date:    09/14/2024  Time:    13:32               Narrative:    EXAMINATION:  XR HIP WITH PELVIS WHEN PERFORMED 2 OR 3 VIEWS LEFT    CLINICAL HISTORY:  Pain in left hip    TECHNIQUE:  AP view of the pelvis and frog leg lateral view of the left hip were performed.    COMPARISON:  None    FINDINGS:  Two views left hip.    There is fracture involving the left femoral neck at the level of the trochanter.  On the oblique view, fracture planes appear to involve the intertrochanteric region.  No left femoroacetabular dislocation.  The sacroiliac joints are intact.  The right femoroacetabular joint is intact.  There is vascular calcification.                                       Medications   pravastatin tablet 40 mg (40 mg Oral Given 9/20/24 0066)   sodium chloride 0.9% flush 10 mL (10 mLs Intravenous Given 9/16/24 8277)   naloxone 0.4 mg/mL injection 0.02 mg (has no administration in time range)   glucose chewable tablet 16 g (has no administration in time range)   glucose chewable tablet 24 g (has no administration in time range)    glucagon (human recombinant) injection 1 mg (has no administration in time range)   insulin aspart U-100 pen 0-5 Units (3 Units Subcutaneous Given 9/19/24 1705)   acetaminophen tablet 650 mg (has no administration in time range)   ondansetron injection 4 mg (4 mg Intravenous Given 9/16/24 0356)   hydrALAZINE injection 10 mg (has no administration in time range)   amLODIPine tablet 10 mg (10 mg Oral Given 9/20/24 0949)   acetaminophen tablet 650 mg (has no administration in time range)   HYDROcodone-acetaminophen 7.5-325 mg per tablet 1 tablet (1 tablet Oral Given 9/20/24 1354)   morphine injection 2 mg (2 mg Intravenous Given 9/16/24 1405)   glucagon (human recombinant) injection 1 mg (has no administration in time range)   sodium chloride 0.9% flush 10 mL (has no administration in time range)   dextrose 10% bolus 125 mL 125 mL (has no administration in time range)   dextrose 10% bolus 250 mL 250 mL (has no administration in time range)   pantoprazole EC tablet 40 mg (40 mg Oral Given 9/20/24 0949)   0.9%  NaCl infusion (for blood administration) ( Intravenous New Bag 9/16/24 0946)   albuterol-ipratropium 2.5 mg-0.5 mg/3 mL nebulizer solution 3 mL (has no administration in time range)   sodium chloride 0.9% flush 10 mL (has no administration in time range)   famotidine tablet 20 mg (20 mg Oral Given 9/19/24 1240)   bisacodyL EC tablet 10 mg (10 mg Oral Given 9/19/24 1011)   enoxaparin injection 40 mg (40 mg Subcutaneous Given 9/20/24 1632)   morphine injection 4 mg (4 mg Intravenous Given 9/14/24 1410)   sodium chloride 0.9% bolus 500 mL 500 mL (0 mLs Intravenous Stopped 9/14/24 1534)   ceFAZolin 2 g in D5W 50 mL IVPB (MB+) (2 g Intravenous New Bag 9/15/24 1015)   mupirocin 2 % ointment ( Nasal Given 9/17/24 2052)   ceFAZolin 2 g in D5W 50 mL IVPB (MB+) (0 g Intravenous Stopped 9/16/24 0426)   magnesium sulfate 2g in water 50mL IVPB (premix) (0 g Intravenous Stopped 9/15/24 6595)   pantoprazole injection 40 mg (40  mg Intravenous Given 9/15/24 1443)   aluminum-magnesium hydroxide-simethicone 200-200-20 mg/5 mL suspension 30 mL (30 mLs Oral Given 9/15/24 1444)   magnesium oxide tablet 800 mg (800 mg Oral Given 9/16/24 0818)   furosemide injection 20 mg (20 mg Intravenous Given 9/16/24 2211)   furosemide injection 20 mg (20 mg Intravenous Given 9/16/24 2247)   magnesium sulfate 2g in water 50mL IVPB (premix) (0 g Intravenous Stopped 9/17/24 1015)   potassium, sodium phosphates 280-160-250 mg packet 2 packet (2 packets Oral Given 9/17/24 1717)   magnesium oxide tablet 800 mg (800 mg Oral Given 9/18/24 1144)   potassium, sodium phosphates 280-160-250 mg packet 2 packet (2 packets Oral Given 9/18/24 1619)   magnesium oxide tablet 400 mg (400 mg Oral Given 9/19/24 0641)   tuberculin injection 5 Units (5 Units Intradermal Given 9/19/24 1237)   magnesium citrate solution 296 mL (296 mLs Oral Given 9/19/24 1406)     Medical Decision Making  Amount and/or Complexity of Data Reviewed  Labs: ordered.  Radiology: ordered.    Risk  OTC drugs.  Prescription drug management.  Decision regarding hospitalization.            Scribe Attestation:   Scribe #1: I performed the above scribed service and the documentation accurately describes the services I performed. I attest to the accuracy of the note.                   Medical Decision Making:   Initial Assessment:   84-year-old female presenting with hip fracture after mechanical trip and fall.  On exam she is well-appearing and in no acute distress.  Pain controlled with analgesia.  Patient tripped over dog.  Suspect mechanical cause.  No prodrome.  EKG without evidence of arrhythmia.  No other sign of trauma.  Patient without significant complaints.  Neurovascularly intact to left leg.  Hip fracture noted.  Discussed with orthopedics.  Patient will be admitted to Hospital Medicine for further evaluation and treatment of hip fracture.             Clinical Impression:  Final diagnoses:  [M25.552]  Left hip pain  [R07.9] Chest pain  [S72.002A] Closed fracture of left hip, initial encounter (Primary)  [E87.1] Hyponatremia  [E78.5] Hyperlipidemia, unspecified hyperlipidemia type  [I10] Hypertension, unspecified type  [D64.9] Anemia, unspecified type  [E83.42] Hypomagnesemia  [I50.33] Acute on chronic diastolic heart failure          ED Disposition Condition    Admit Stable             I, Bernabe Greene, personally performed the services described in this documentation. All medical record entries made by the scribe were at my direction and in my presence. I have reviewed the chart and agree that the record reflects my personal performance and is accurate and complete.      DISCLAIMER: This note was prepared with [employer] voice recognition transcription software. Garbled syntax, mangled pronouns, and other bizarre constructions may be attributed to that software system.       Bernabe Greene MD  09/20/24 1927

## 2024-09-14 NOTE — SUBJECTIVE & OBJECTIVE
Past Medical History:   Diagnosis Date    Diabetes mellitus     Hypertension        Past Surgical History:   Procedure Laterality Date    HYSTERECTOMY      OOPHORECTOMY         Review of patient's allergies indicates:  No Known Allergies    Current Facility-Administered Medications on File Prior to Encounter   Medication    acetaminophen tablet 650 mg    albuterol inhaler 2 puff    diphenhydrAMINE injection 25 mg    EPINEPHrine (EPIPEN) 0.3 mg/0.3 mL pen injection 0.3 mg    methylPREDNISolone sodium succinate injection 40 mg    ondansetron disintegrating tablet 4 mg    sodium chloride 0.9% 500 mL flush bag    sodium chloride 0.9% flush 10 mL     Current Outpatient Medications on File Prior to Encounter   Medication Sig    amLODIPine (NORVASC) 5 MG tablet Take 1 tablet (5 mg total) by mouth once daily.    baclofen (LIORESAL) 10 MG tablet Take 0.5 tablets (5 mg total) by mouth 3 (three) times daily.    omeprazole (PRILOSEC) 20 MG capsule Take 20 mg by mouth 2 (two) times daily.    pravastatin (PRAVACHOL) 40 MG tablet Take 40 mg by mouth once daily.     Family History       Problem Relation (Age of Onset)    Breast cancer Sister          Tobacco Use    Smoking status: Never    Smokeless tobacco: Never   Substance and Sexual Activity    Alcohol use: Never    Drug use: Never    Sexual activity: Not on file     Review of Systems   Constitutional:  Positive for activity change. Negative for chills, fatigue and fever.   Respiratory:  Negative for cough, chest tightness and shortness of breath.    Cardiovascular:  Negative for chest pain, palpitations and leg swelling.   Gastrointestinal:  Negative for abdominal pain, diarrhea, nausea and vomiting.   Genitourinary:  Negative for difficulty urinating and dysuria.   Musculoskeletal:  Positive for arthralgias and gait problem. Negative for joint swelling.   Neurological:  Positive for dizziness and weakness. Negative for syncope and headaches.   Psychiatric/Behavioral:   Negative for confusion and hallucinations.      Objective:     Vital Signs (Most Recent):  Temp: 97.6 °F (36.4 °C) (09/14/24 1421)  Pulse: 63 (09/14/24 1545)  Resp: 18 (09/14/24 1421)  BP: (!) 151/67 (09/14/24 1508)  SpO2: 100 % (09/14/24 1421) Vital Signs (24h Range):  Temp:  [96.1 °F (35.6 °C)-97.6 °F (36.4 °C)] 97.6 °F (36.4 °C)  Pulse:  [63-80] 63  Resp:  [13-18] 18  SpO2:  [98 %-100 %] 100 %  BP: (111-190)/(55-70) 151/67     Weight: 63.5 kg (140 lb)  Body mass index is 24.03 kg/m².     Physical Exam  Vitals and nursing note reviewed.   Constitutional:       General: She is not in acute distress.     Appearance: She is not ill-appearing.   HENT:      Nose: Nose normal.      Mouth/Throat:      Mouth: Mucous membranes are moist.   Eyes:      Extraocular Movements: Extraocular movements intact.      Pupils: Pupils are equal, round, and reactive to light.   Cardiovascular:      Rate and Rhythm: Normal rate and regular rhythm.      Heart sounds: No murmur heard.  Pulmonary:      Effort: Pulmonary effort is normal. No respiratory distress.      Breath sounds: Normal breath sounds. No wheezing.   Abdominal:      General: There is no distension.      Palpations: Abdomen is soft.      Tenderness: There is no abdominal tenderness.   Musculoskeletal:         General: Tenderness (left hip pain) present. No swelling.      Right lower leg: No edema.      Left lower leg: No edema.      Comments: ROM limited due to pain    Skin:     General: Skin is warm and dry.   Neurological:      General: No focal deficit present.      Mental Status: She is alert and oriented to person, place, and time.   Psychiatric:         Mood and Affect: Mood normal.         Thought Content: Thought content normal.              CRANIAL NERVES     CN III, IV, VI   Pupils are equal, round, and reactive to light.       Significant Labs: All pertinent labs within the past 24 hours have been reviewed.    CBC:   Recent Labs   Lab 09/14/24  1410   WBC 7.91    HGB 11.1*   HCT 32.5*        CMP:   Recent Labs   Lab 09/14/24  1410      K 4.1      CO2 23   *   BUN 18   CREATININE 0.8   CALCIUM 9.8   PROT 8.0   ALBUMIN 3.9   BILITOT 0.5   ALKPHOS 58   AST 16   ALT 8*   ANIONGAP 14     Cardiac Markers:   Recent Labs   Lab 09/14/24  1410   BNP 54       Recent Labs   Lab 09/14/24  1410   TROPONINI <0.006         Significant Imaging: I have reviewed all pertinent imaging results/findings within the past 24 hours.    Imaging Results              X-Ray Femur 2 View Left (In process)  Result time 09/14/24 16:03:11                     X-Ray Chest AP Portable (Final result)  Result time 09/14/24 14:55:42      Final result by Sony Greene MD (09/14/24 14:55:42)                   Impression:      1. Chronic appearing interstitial findings, no large focal consolidation.      Electronically signed by: Sony Greene MD  Date:    09/14/2024  Time:    14:55               Narrative:    EXAMINATION:  XR CHEST AP PORTABLE    CLINICAL HISTORY:  Chest Pain;    TECHNIQUE:  Single frontal view of the chest was performed.    COMPARISON:  06/23/2024    FINDINGS:  The cardiomediastinal silhouette is not enlarged noting calcification of the aorta..  There is no pleural effusion.  The trachea is midline.  The lungs are symmetrically expanded bilaterally with coarse interstitial attenuation.  No large focal consolidation seen.  There is no pneumothorax.  The osseous structures are remarkable for degenerative change..                                       CT Head Without Contrast (Final result)  Result time 09/14/24 14:11:14      Final result by Sony Greene MD (09/14/24 14:11:14)                   Impression:      1. Allowing for motion artifact, no convincing acute intracranial abnormalities noting sequela of chronic microvascular ischemic change and senescent change.  2. Sinus disease.      Electronically signed by: Sony Greene  MD  Date:    09/14/2024  Time:    14:11               Narrative:    EXAMINATION:  CT HEAD WITHOUT CONTRAST    CLINICAL HISTORY:  Head trauma, minor (Age >= 65y);    TECHNIQUE:  Low dose axial images were obtained through the head.  Coronal and sagittal reformations were also performed. Contrast was not administered.    COMPARISON:  06/23/2024    FINDINGS:  There is motion artifact.    There is generalized cerebral volume loss.  There is hypoattenuation in a periventricular fashion, likely sequela of chronic microvascular ischemic change.There are a few punctate foci of low attenuation within the left basal ganglia, may reflect sequela of remote infarct or prominent perivascular spaces.  There is no evidence of acute major vascular territory infarct, hemorrhage, or mass.  There is no hydrocephalus.  There are no abnormal extra-axial fluid collections.  There is fluid within the left sphenoid sinus and trace opacification of the inferior most left mastoid air cells, otherwise the visualized paranasal sinuses and mastoid air cells are clear, and there is no evidence of calvarial fracture.  The visualized soft tissues are unremarkable.                                       CT Hip Without Contrast Left (Final result)  Result time 09/14/24 14:06:17      Final result by Sony Greene MD (09/14/24 14:06:17)                   Impression:      1. Proximal left femoral fracture as described.      Electronically signed by: Sony Greene MD  Date:    09/14/2024  Time:    14:06               Narrative:    EXAMINATION:  CT HIP WITHOUT CONTRAST LEFT    CLINICAL HISTORY:  Fracture, hip;    TECHNIQUE:  Axial images of the left hip were obtained at 1.25 mm intervals without administration of IV contrast.  Coronal and sagittal reformatted images were reviewed.    COMPARISON:  Radiograph 09/14/2024    FINDINGS:  There is motion artifact.  There is osteopenia.  There is acute appearing impacted fracture involving the base of the  left femoral neck, with planes extending to the greater trochanter and intertrochanteric region.  The left femoral head otherwise maintains appropriate relationship with the glenoid.  There are degenerative changes of the pubic symphysis.  The left sacroiliac joint is intact.  There is edema about the femoral fracture site.  The urinary bladder is distended without wall thickening.  There is vascular calcification.                                       X-Ray Hip 2 or 3 views Left with Pelvis when performed (Final result)  Result time 09/14/24 13:32:00      Final result by Sony Greene MD (09/14/24 13:32:00)                   Impression:      1. Proximal left femoral fracture as described.      Electronically signed by: Sony Greene MD  Date:    09/14/2024  Time:    13:32               Narrative:    EXAMINATION:  XR HIP WITH PELVIS WHEN PERFORMED 2 OR 3 VIEWS LEFT    CLINICAL HISTORY:  Pain in left hip    TECHNIQUE:  AP view of the pelvis and frog leg lateral view of the left hip were performed.    COMPARISON:  None    FINDINGS:  Two views left hip.    There is fracture involving the left femoral neck at the level of the trochanter.  On the oblique view, fracture planes appear to involve the intertrochanteric region.  No left femoroacetabular dislocation.  The sacroiliac joints are intact.  The right femoroacetabular joint is intact.  There is vascular calcification.

## 2024-09-14 NOTE — ASSESSMENT & PLAN NOTE
Anemia is likely due to Iron deficiency. Most recent hemoglobin and hematocrit are listed below.  Recent Labs     09/14/24  1410   HGB 11.1*   HCT 32.5*     Plan  - Monitor serial CBC: Daily  - Transfuse PRBC if patient becomes hemodynamically unstable, symptomatic or H/H drops below 7/21.  - Patient has not received any PRBC transfusions to date  - Patient's anemia is currently stable  - monitor. Ordered iron profile

## 2024-09-14 NOTE — NURSING
Patient has arrived on the unit via stretcher on room air, awake and alert. Family at bedside. Oriented to room, situated in bed, vitals and 4 eyes completed. Tele in place. Bed in lowest position  wheels locked, call light in reach.

## 2024-09-15 ENCOUNTER — ANESTHESIA (OUTPATIENT)
Dept: SURGERY | Facility: HOSPITAL | Age: 84
End: 2024-09-15
Payer: MEDICARE

## 2024-09-15 LAB
ANION GAP SERPL CALC-SCNC: 13 MMOL/L (ref 8–16)
BASOPHILS # BLD AUTO: 0.02 K/UL (ref 0–0.2)
BASOPHILS # BLD AUTO: 0.04 K/UL (ref 0–0.2)
BASOPHILS NFR BLD: 0.3 % (ref 0–1.9)
BASOPHILS NFR BLD: 0.5 % (ref 0–1.9)
BUN SERPL-MCNC: 15 MG/DL (ref 8–23)
CALCIUM SERPL-MCNC: 8.8 MG/DL (ref 8.7–10.5)
CHLORIDE SERPL-SCNC: 99 MMOL/L (ref 95–110)
CO2 SERPL-SCNC: 24 MMOL/L (ref 23–29)
CREAT SERPL-MCNC: 0.8 MG/DL (ref 0.5–1.4)
DIFFERENTIAL METHOD BLD: ABNORMAL
DIFFERENTIAL METHOD BLD: ABNORMAL
EOSINOPHIL # BLD AUTO: 0 K/UL (ref 0–0.5)
EOSINOPHIL # BLD AUTO: 0.1 K/UL (ref 0–0.5)
EOSINOPHIL NFR BLD: 0.5 % (ref 0–8)
EOSINOPHIL NFR BLD: 0.8 % (ref 0–8)
ERYTHROCYTE [DISTWIDTH] IN BLOOD BY AUTOMATED COUNT: 13.6 % (ref 11.5–14.5)
ERYTHROCYTE [DISTWIDTH] IN BLOOD BY AUTOMATED COUNT: 13.8 % (ref 11.5–14.5)
EST. GFR  (NO RACE VARIABLE): >60 ML/MIN/1.73 M^2
ESTIMATED AVG GLUCOSE: 166 MG/DL (ref 68–131)
GLUCOSE SERPL-MCNC: 166 MG/DL (ref 70–110)
HBA1C MFR BLD: 7.4 % (ref 4–5.6)
HCT VFR BLD AUTO: 26.1 % (ref 37–48.5)
HCT VFR BLD AUTO: 30 % (ref 37–48.5)
HGB BLD-MCNC: 8.6 G/DL (ref 12–16)
HGB BLD-MCNC: 9.9 G/DL (ref 12–16)
IMM GRANULOCYTES # BLD AUTO: 0.02 K/UL (ref 0–0.04)
IMM GRANULOCYTES # BLD AUTO: 0.03 K/UL (ref 0–0.04)
IMM GRANULOCYTES NFR BLD AUTO: 0.3 % (ref 0–0.5)
IMM GRANULOCYTES NFR BLD AUTO: 0.4 % (ref 0–0.5)
LYMPHOCYTES # BLD AUTO: 1.1 K/UL (ref 1–4.8)
LYMPHOCYTES # BLD AUTO: 1.2 K/UL (ref 1–4.8)
LYMPHOCYTES NFR BLD: 14.1 % (ref 18–48)
LYMPHOCYTES NFR BLD: 16.4 % (ref 18–48)
MAGNESIUM SERPL-MCNC: 1.2 MG/DL (ref 1.6–2.6)
MCH RBC QN AUTO: 29.3 PG (ref 27–31)
MCH RBC QN AUTO: 29.9 PG (ref 27–31)
MCHC RBC AUTO-ENTMCNC: 33 G/DL (ref 32–36)
MCHC RBC AUTO-ENTMCNC: 33 G/DL (ref 32–36)
MCV RBC AUTO: 89 FL (ref 82–98)
MCV RBC AUTO: 91 FL (ref 82–98)
MONOCYTES # BLD AUTO: 1 K/UL (ref 0.3–1)
MONOCYTES # BLD AUTO: 1 K/UL (ref 0.3–1)
MONOCYTES NFR BLD: 13.2 % (ref 4–15)
MONOCYTES NFR BLD: 13.4 % (ref 4–15)
NEUTROPHILS # BLD AUTO: 5.1 K/UL (ref 1.8–7.7)
NEUTROPHILS # BLD AUTO: 5.5 K/UL (ref 1.8–7.7)
NEUTROPHILS NFR BLD: 69.1 % (ref 38–73)
NEUTROPHILS NFR BLD: 71 % (ref 38–73)
NRBC BLD-RTO: 0 /100 WBC
NRBC BLD-RTO: 0 /100 WBC
OHS QRS DURATION: 68 MS
OHS QTC CALCULATION: 452 MS
PHOSPHATE SERPL-MCNC: 4.8 MG/DL (ref 2.7–4.5)
PLATELET # BLD AUTO: 219 K/UL (ref 150–450)
PLATELET # BLD AUTO: 223 K/UL (ref 150–450)
PMV BLD AUTO: 11.3 FL (ref 9.2–12.9)
PMV BLD AUTO: 9.9 FL (ref 9.2–12.9)
POCT GLUCOSE: 168 MG/DL (ref 70–110)
POCT GLUCOSE: 169 MG/DL (ref 70–110)
POCT GLUCOSE: 239 MG/DL (ref 70–110)
POTASSIUM SERPL-SCNC: 3.8 MMOL/L (ref 3.5–5.1)
RBC # BLD AUTO: 2.88 M/UL (ref 4–5.4)
RBC # BLD AUTO: 3.38 M/UL (ref 4–5.4)
SODIUM SERPL-SCNC: 136 MMOL/L (ref 136–145)
WBC # BLD AUTO: 7.43 K/UL (ref 3.9–12.7)
WBC # BLD AUTO: 7.74 K/UL (ref 3.9–12.7)

## 2024-09-15 PROCEDURE — 94761 N-INVAS EAR/PLS OXIMETRY MLT: CPT

## 2024-09-15 PROCEDURE — 83735 ASSAY OF MAGNESIUM: CPT | Performed by: STUDENT IN AN ORGANIZED HEALTH CARE EDUCATION/TRAINING PROGRAM

## 2024-09-15 PROCEDURE — 85025 COMPLETE CBC W/AUTO DIFF WBC: CPT | Mod: 91 | Performed by: ANESTHESIOLOGY

## 2024-09-15 PROCEDURE — 36415 COLL VENOUS BLD VENIPUNCTURE: CPT | Performed by: STUDENT IN AN ORGANIZED HEALTH CARE EDUCATION/TRAINING PROGRAM

## 2024-09-15 PROCEDURE — 36000711: Performed by: ORTHOPAEDIC SURGERY

## 2024-09-15 PROCEDURE — 36000710: Performed by: ORTHOPAEDIC SURGERY

## 2024-09-15 PROCEDURE — 63600175 PHARM REV CODE 636 W HCPCS: Performed by: NURSE ANESTHETIST, CERTIFIED REGISTERED

## 2024-09-15 PROCEDURE — 63600175 PHARM REV CODE 636 W HCPCS: Performed by: ORTHOPAEDIC SURGERY

## 2024-09-15 PROCEDURE — 37000008 HC ANESTHESIA 1ST 15 MINUTES: Performed by: ORTHOPAEDIC SURGERY

## 2024-09-15 PROCEDURE — 21400001 HC TELEMETRY ROOM

## 2024-09-15 PROCEDURE — 63600175 PHARM REV CODE 636 W HCPCS: Performed by: STUDENT IN AN ORGANIZED HEALTH CARE EDUCATION/TRAINING PROGRAM

## 2024-09-15 PROCEDURE — C1713 ANCHOR/SCREW BN/BN,TIS/BN: HCPCS | Performed by: ORTHOPAEDIC SURGERY

## 2024-09-15 PROCEDURE — 36415 COLL VENOUS BLD VENIPUNCTURE: CPT | Performed by: ANESTHESIOLOGY

## 2024-09-15 PROCEDURE — 25000003 PHARM REV CODE 250: Performed by: ORTHOPAEDIC SURGERY

## 2024-09-15 PROCEDURE — 71000033 HC RECOVERY, INTIAL HOUR: Performed by: ORTHOPAEDIC SURGERY

## 2024-09-15 PROCEDURE — 0QS706Z REPOSITION LEFT UPPER FEMUR WITH INTRAMEDULLARY INTERNAL FIXATION DEVICE, OPEN APPROACH: ICD-10-PCS | Performed by: ORTHOPAEDIC SURGERY

## 2024-09-15 PROCEDURE — 63600175 PHARM REV CODE 636 W HCPCS: Mod: JZ,JG | Performed by: ANESTHESIOLOGY

## 2024-09-15 PROCEDURE — 37000009 HC ANESTHESIA EA ADD 15 MINS: Performed by: ORTHOPAEDIC SURGERY

## 2024-09-15 PROCEDURE — 71000039 HC RECOVERY, EACH ADD'L HOUR: Performed by: ORTHOPAEDIC SURGERY

## 2024-09-15 PROCEDURE — 27201423 OPTIME MED/SURG SUP & DEVICES STERILE SUPPLY: Performed by: ORTHOPAEDIC SURGERY

## 2024-09-15 PROCEDURE — 25000003 PHARM REV CODE 250: Performed by: NURSE ANESTHETIST, CERTIFIED REGISTERED

## 2024-09-15 PROCEDURE — 84100 ASSAY OF PHOSPHORUS: CPT | Performed by: STUDENT IN AN ORGANIZED HEALTH CARE EDUCATION/TRAINING PROGRAM

## 2024-09-15 PROCEDURE — 85025 COMPLETE CBC W/AUTO DIFF WBC: CPT | Performed by: STUDENT IN AN ORGANIZED HEALTH CARE EDUCATION/TRAINING PROGRAM

## 2024-09-15 PROCEDURE — 80048 BASIC METABOLIC PNL TOTAL CA: CPT | Performed by: STUDENT IN AN ORGANIZED HEALTH CARE EDUCATION/TRAINING PROGRAM

## 2024-09-15 PROCEDURE — C1769 GUIDE WIRE: HCPCS | Performed by: ORTHOPAEDIC SURGERY

## 2024-09-15 PROCEDURE — 25000003 PHARM REV CODE 250: Performed by: STUDENT IN AN ORGANIZED HEALTH CARE EDUCATION/TRAINING PROGRAM

## 2024-09-15 DEVICE — NAIL IM CANN 130 DEG 10X170: Type: IMPLANTABLE DEVICE | Site: FEMUR | Status: FUNCTIONAL

## 2024-09-15 DEVICE — SCREW TFN ADVANCE 90MM: Type: IMPLANTABLE DEVICE | Site: FEMUR | Status: FUNCTIONAL

## 2024-09-15 DEVICE — SCREW BONE LOCK IM NAIL 34X5MM: Type: IMPLANTABLE DEVICE | Site: FEMUR | Status: FUNCTIONAL

## 2024-09-15 RX ORDER — EPHEDRINE SULFATE 50 MG/ML
INJECTION, SOLUTION INTRAVENOUS
Status: DISCONTINUED | OUTPATIENT
Start: 2024-09-15 | End: 2024-09-15

## 2024-09-15 RX ORDER — ALUMINUM HYDROXIDE, MAGNESIUM HYDROXIDE, AND SIMETHICONE 1200; 120; 1200 MG/30ML; MG/30ML; MG/30ML
30 SUSPENSION ORAL ONCE
Status: COMPLETED | OUTPATIENT
Start: 2024-09-15 | End: 2024-09-15

## 2024-09-15 RX ORDER — PANTOPRAZOLE SODIUM 40 MG/10ML
40 INJECTION, POWDER, LYOPHILIZED, FOR SOLUTION INTRAVENOUS ONCE
Status: COMPLETED | OUTPATIENT
Start: 2024-09-15 | End: 2024-09-15

## 2024-09-15 RX ORDER — FAMOTIDINE 20 MG/1
20 TABLET, FILM COATED ORAL DAILY
Status: DISCONTINUED | OUTPATIENT
Start: 2024-09-16 | End: 2024-09-15

## 2024-09-15 RX ORDER — HYDROCODONE BITARTRATE AND ACETAMINOPHEN 5; 325 MG/1; MG/1
1 TABLET ORAL EVERY 4 HOURS PRN
Status: DISCONTINUED | OUTPATIENT
Start: 2024-09-15 | End: 2024-09-15

## 2024-09-15 RX ORDER — HYDROCODONE BITARTRATE AND ACETAMINOPHEN 7.5; 325 MG/1; MG/1
1 TABLET ORAL EVERY 6 HOURS PRN
Status: DISCONTINUED | OUTPATIENT
Start: 2024-09-15 | End: 2024-09-20 | Stop reason: HOSPADM

## 2024-09-15 RX ORDER — MORPHINE SULFATE 4 MG/ML
2 INJECTION, SOLUTION INTRAMUSCULAR; INTRAVENOUS EVERY 4 HOURS PRN
Status: DISCONTINUED | OUTPATIENT
Start: 2024-09-15 | End: 2024-09-20 | Stop reason: HOSPADM

## 2024-09-15 RX ORDER — DOCUSATE SODIUM 100 MG/1
100 CAPSULE, LIQUID FILLED ORAL 2 TIMES DAILY
Status: DISCONTINUED | OUTPATIENT
Start: 2024-09-15 | End: 2024-09-20

## 2024-09-15 RX ORDER — BUPIVACAINE HYDROCHLORIDE 5 MG/ML
INJECTION, SOLUTION EPIDURAL; INTRACAUDAL
Status: COMPLETED | OUTPATIENT
Start: 2024-09-15 | End: 2024-09-15

## 2024-09-15 RX ORDER — SODIUM CHLORIDE 9 MG/ML
INJECTION, SOLUTION INTRAVENOUS CONTINUOUS
Status: DISCONTINUED | OUTPATIENT
Start: 2024-09-15 | End: 2024-09-16

## 2024-09-15 RX ORDER — ONDANSETRON HYDROCHLORIDE 2 MG/ML
INJECTION, SOLUTION INTRAVENOUS
Status: DISCONTINUED | OUTPATIENT
Start: 2024-09-15 | End: 2024-09-15

## 2024-09-15 RX ORDER — OXYCODONE HYDROCHLORIDE 5 MG/1
5 TABLET ORAL EVERY 4 HOURS PRN
Status: DISCONTINUED | OUTPATIENT
Start: 2024-09-15 | End: 2024-09-15

## 2024-09-15 RX ORDER — AMLODIPINE BESYLATE 5 MG/1
10 TABLET ORAL DAILY
Status: DISCONTINUED | OUTPATIENT
Start: 2024-09-16 | End: 2024-09-20 | Stop reason: HOSPADM

## 2024-09-15 RX ORDER — MAGNESIUM SULFATE HEPTAHYDRATE 40 MG/ML
4 INJECTION, SOLUTION INTRAVENOUS ONCE
Status: COMPLETED | OUTPATIENT
Start: 2024-09-15 | End: 2024-09-15

## 2024-09-15 RX ORDER — LIDOCAINE HYDROCHLORIDE 20 MG/ML
INJECTION INTRAVENOUS
Status: DISCONTINUED | OUTPATIENT
Start: 2024-09-15 | End: 2024-09-15

## 2024-09-15 RX ORDER — SODIUM CHLORIDE 0.9 % (FLUSH) 0.9 %
10 SYRINGE (ML) INJECTION
Status: DISCONTINUED | OUTPATIENT
Start: 2024-09-15 | End: 2024-09-20 | Stop reason: HOSPADM

## 2024-09-15 RX ORDER — FENTANYL CITRATE 50 UG/ML
INJECTION, SOLUTION INTRAMUSCULAR; INTRAVENOUS
Status: DISCONTINUED | OUTPATIENT
Start: 2024-09-15 | End: 2024-09-15

## 2024-09-15 RX ORDER — MAGNESIUM SULFATE HEPTAHYDRATE 40 MG/ML
2 INJECTION, SOLUTION INTRAVENOUS ONCE
Status: DISCONTINUED | OUTPATIENT
Start: 2024-09-15 | End: 2024-09-15

## 2024-09-15 RX ORDER — PHENYLEPHRINE HYDROCHLORIDE 10 MG/ML
INJECTION INTRAVENOUS
Status: DISCONTINUED | OUTPATIENT
Start: 2024-09-15 | End: 2024-09-15

## 2024-09-15 RX ORDER — CEFAZOLIN SODIUM 1 G/3ML
2 INJECTION, POWDER, FOR SOLUTION INTRAMUSCULAR; INTRAVENOUS
Status: DISCONTINUED | OUTPATIENT
Start: 2024-09-15 | End: 2024-09-15

## 2024-09-15 RX ORDER — GLUCAGON 1 MG
1 KIT INJECTION
Status: DISCONTINUED | OUTPATIENT
Start: 2024-09-15 | End: 2024-09-20 | Stop reason: HOSPADM

## 2024-09-15 RX ORDER — HYDRALAZINE HYDROCHLORIDE 20 MG/ML
10 INJECTION INTRAMUSCULAR; INTRAVENOUS EVERY 6 HOURS PRN
Status: DISCONTINUED | OUTPATIENT
Start: 2024-09-15 | End: 2024-09-20 | Stop reason: HOSPADM

## 2024-09-15 RX ORDER — PROPOFOL 10 MG/ML
VIAL (ML) INTRAVENOUS
Status: DISCONTINUED | OUTPATIENT
Start: 2024-09-15 | End: 2024-09-15

## 2024-09-15 RX ORDER — ACETAMINOPHEN 325 MG/1
650 TABLET ORAL EVERY 8 HOURS PRN
Status: DISCONTINUED | OUTPATIENT
Start: 2024-09-15 | End: 2024-09-20 | Stop reason: HOSPADM

## 2024-09-15 RX ORDER — FAMOTIDINE 20 MG/1
20 TABLET, FILM COATED ORAL DAILY
Status: DISCONTINUED | OUTPATIENT
Start: 2024-09-16 | End: 2024-09-16

## 2024-09-15 RX ORDER — MUPIROCIN 20 MG/G
OINTMENT TOPICAL 2 TIMES DAILY
Status: COMPLETED | OUTPATIENT
Start: 2024-09-15 | End: 2024-09-17

## 2024-09-15 RX ORDER — PROPOFOL 10 MG/ML
VIAL (ML) INTRAVENOUS CONTINUOUS PRN
Status: DISCONTINUED | OUTPATIENT
Start: 2024-09-15 | End: 2024-09-15

## 2024-09-15 RX ADMIN — PHENYLEPHRINE HYDROCHLORIDE 100 MCG: 10 INJECTION INTRAVENOUS at 10:09

## 2024-09-15 RX ADMIN — SODIUM CHLORIDE, SODIUM LACTATE, POTASSIUM CHLORIDE, AND CALCIUM CHLORIDE: .6; .31; .03; .02 INJECTION, SOLUTION INTRAVENOUS at 11:09

## 2024-09-15 RX ADMIN — ENOXAPARIN SODIUM 40 MG: 40 INJECTION SUBCUTANEOUS at 05:09

## 2024-09-15 RX ADMIN — PHENYLEPHRINE HYDROCHLORIDE 100 MCG: 10 INJECTION INTRAVENOUS at 11:09

## 2024-09-15 RX ADMIN — ONDANSETRON 4 MG: 2 INJECTION, SOLUTION INTRAMUSCULAR; INTRAVENOUS at 10:09

## 2024-09-15 RX ADMIN — FENTANYL CITRATE 50 MCG: 50 INJECTION, SOLUTION INTRAMUSCULAR; INTRAVENOUS at 10:09

## 2024-09-15 RX ADMIN — PROPOFOL 20 MG: 10 INJECTION, EMULSION INTRAVENOUS at 10:09

## 2024-09-15 RX ADMIN — CEFAZOLIN 2 G: 2 INJECTION, POWDER, FOR SOLUTION INTRAMUSCULAR; INTRAVENOUS at 10:09

## 2024-09-15 RX ADMIN — DOCUSATE SODIUM 100 MG: 100 CAPSULE, LIQUID FILLED ORAL at 09:09

## 2024-09-15 RX ADMIN — HYDROCODONE BITARTRATE AND ACETAMINOPHEN 1 TABLET: 5; 325 TABLET ORAL at 04:09

## 2024-09-15 RX ADMIN — PROPOFOL 30 MCG/KG/MIN: 10 INJECTION, EMULSION INTRAVENOUS at 10:09

## 2024-09-15 RX ADMIN — GLYCOPYRROLATE 0.1 MG: 0.2 INJECTION, SOLUTION INTRAMUSCULAR; INTRAVITREAL at 10:09

## 2024-09-15 RX ADMIN — OXYCODONE HYDROCHLORIDE 5 MG: 5 TABLET ORAL at 05:09

## 2024-09-15 RX ADMIN — SODIUM CHLORIDE, SODIUM LACTATE, POTASSIUM CHLORIDE, AND CALCIUM CHLORIDE: .6; .31; .03; .02 INJECTION, SOLUTION INTRAVENOUS at 10:09

## 2024-09-15 RX ADMIN — PANTOPRAZOLE SODIUM 40 MG: 40 INJECTION, POWDER, LYOPHILIZED, FOR SOLUTION INTRAVENOUS at 02:09

## 2024-09-15 RX ADMIN — HYDROCODONE BITARTRATE AND ACETAMINOPHEN 1 TABLET: 7.5; 325 TABLET ORAL at 09:09

## 2024-09-15 RX ADMIN — FENTANYL CITRATE 25 MCG: 50 INJECTION, SOLUTION INTRAMUSCULAR; INTRAVENOUS at 10:09

## 2024-09-15 RX ADMIN — CEFAZOLIN 2 G: 2 INJECTION, POWDER, FOR SOLUTION INTRAMUSCULAR; INTRAVENOUS at 09:09

## 2024-09-15 RX ADMIN — ONDANSETRON 4 MG: 2 INJECTION INTRAMUSCULAR; INTRAVENOUS at 12:09

## 2024-09-15 RX ADMIN — MAGNESIUM SULFATE HEPTAHYDRATE 4 G: 40 INJECTION, SOLUTION INTRAVENOUS at 02:09

## 2024-09-15 RX ADMIN — ALUMINUM HYDROXIDE, MAGNESIUM HYDROXIDE, AND SIMETHICONE 30 ML: 1200; 120; 1200 SUSPENSION ORAL at 02:09

## 2024-09-15 RX ADMIN — EPHEDRINE SULFATE 10 MG: 50 INJECTION INTRAVENOUS at 11:09

## 2024-09-15 RX ADMIN — HYDROCODONE BITARTRATE AND ACETAMINOPHEN 1 TABLET: 5; 325 TABLET ORAL at 05:09

## 2024-09-15 RX ADMIN — PHENYLEPHRINE HYDROCHLORIDE 200 MCG: 10 INJECTION INTRAVENOUS at 11:09

## 2024-09-15 RX ADMIN — MUPIROCIN: 20 OINTMENT TOPICAL at 09:09

## 2024-09-15 RX ADMIN — BUPIVACAINE HYDROCHLORIDE 2.5 ML: 5 INJECTION, SOLUTION EPIDURAL; INTRACAUDAL; PERINEURAL at 10:09

## 2024-09-15 RX ADMIN — LIDOCAINE HYDROCHLORIDE 60 MG: 20 INJECTION, SOLUTION INTRAVENOUS at 10:09

## 2024-09-15 RX ADMIN — SODIUM CHLORIDE: 9 INJECTION, SOLUTION INTRAVENOUS at 09:09

## 2024-09-15 NOTE — PLAN OF CARE
Problem: Adult Inpatient Plan of Care  Goal: Plan of Care Review  Outcome: Progressing  Flowsheets (Taken 9/14/2024 1906)  Plan of Care Reviewed With: patient     Problem: Diabetes Comorbidity  Goal: Blood Glucose Level Within Targeted Range  Outcome: Progressing  Intervention: Monitor and Manage Glycemia  Flowsheets (Taken 9/14/2024 1906)  Glycemic Management: blood glucose monitored     Problem: Fall Injury Risk  Goal: Absence of Fall and Fall-Related Injury  Outcome: Progressing  Intervention: Identify and Manage Contributors  Flowsheets (Taken 9/14/2024 1906)  Self-Care Promotion:   independence encouraged   BADL personal objects within reach   BADL personal routines maintained  Medication Review/Management: medications reviewed  Intervention: Promote Injury-Free Environment  Flowsheets (Taken 9/14/2024 1906)  Safety Promotion/Fall Prevention:   assistive device/personal item within reach   medications reviewed

## 2024-09-15 NOTE — NURSING
Report received from night nurse BITA Boyer. Walking rounds completed. Visualized patient and assessed patient's overall condition and appearance. No acute distress noted. Will continue to monitor

## 2024-09-15 NOTE — CONSULTS
CELINAC. left hip pain status post fall with intertrochanteric femur fracture    HPI: Marya Chopra84 y.o. complaining of left hip pain status post fall suffering with intertrochanteric femur fracture.  It appears the fracture line comes just proximal to the lesser troch and the lesser troch appears to be stable.  We will plan for an operative fixation with intramedullary nail for this fracture.  The patient is agreeable to this she signed the consents.  She normally ambulates without any assistive devices as community ambulator.  She suffered a fall yesterday and was unable to stand up.  She was admitted through the emergency room.  She has had some frequent falls recently.    ROS:   Pertinent positives:  Left hip pain   Negatives: F/C, N/V, CP, SOB,    All other 14 point ROS negative    PMH:   Past Medical History:   Diagnosis Date    Diabetes mellitus     Hypertension        PSH:   Past Surgical History:   Procedure Laterality Date    HYSTERECTOMY      OOPHORECTOMY         Social Hx:   Social History     Occupational History    Not on file   Tobacco Use    Smoking status: Never    Smokeless tobacco: Never   Substance and Sexual Activity    Alcohol use: Never    Drug use: Never    Sexual activity: Not on file       Medications:    No current facility-administered medications on file prior to encounter.     Current Outpatient Medications on File Prior to Encounter   Medication Sig Dispense Refill    amLODIPine (NORVASC) 5 MG tablet Take 1 tablet (5 mg total) by mouth once daily. 90 tablet 0    baclofen (LIORESAL) 10 MG tablet Take 0.5 tablets (5 mg total) by mouth 3 (three) times daily. 7 tablet 0    omeprazole (PRILOSEC) 20 MG capsule Take 20 mg by mouth 2 (two) times daily.      pravastatin (PRAVACHOL) 40 MG tablet Take 40 mg by mouth once daily.  3         PE:         Vitals:    09/15/24 0848   BP: (!) 157/62   Pulse: 78   Resp: (!) 21   Temp: 98 °F (36.7 °C)       Estimated body mass index is 19.92 kg/m² as  "calculated from the following:    Height as of this encounter: 5' 3" (1.6 m).    Weight as of this encounter: 51 kg (112 lb 7 oz).     General frail, elderly     Extremity:  Examination left hip she has pain with any attempted range of motion left hip.  Positive pain on palpation around the left hip.  No cellulitis erythema worrisome signs for infection.  She appears to be neurovascular intact distally to left lower extremity.    Labs:    Lab Results   Component Value Date    WBC 7.43 09/15/2024    HGB 9.9 (L) 09/15/2024    HCT 30.0 (L) 09/15/2024    MCV 89 09/15/2024     09/15/2024           BMP  Lab Results   Component Value Date     09/15/2024    K 3.8 09/15/2024    CL 99 09/15/2024    CO2 24 09/15/2024    BUN 15 09/15/2024    CREATININE 0.8 09/15/2024    CALCIUM 8.8 09/15/2024    ANIONGAP 13 09/15/2024    EGFRNORACEVR >60 09/15/2024       Lab Results   Component Value Date    INR 1.0 04/29/2011       No results found for: "SEDRATE"    No results found for: "CRP"    Radiography:  Film    Interpretation    X-rays and CT scan of the left hip shows a displaced left hip intertrochanteric femur fracture it appears that the lesser troch is still intact the fracture line exits just proximal to the lesser trochanter.    A/P  84 y.o.female with a left hip intertrochanteric femur fracture closed displaced comminuted but appears to be stable.    Will plan for intramedullary nailing of the left hip today to fix the fracture.  The patient is agreeable to this and signed the consents.      Risks and complications were discussed including but not limited to the risks of anesthetic complications, infection, wound healing complications, bleeding, injury to nerve, vessels, anr or soft tissues. Need to repeat or perform future operations, instability, limb length inequality, rotational discrepancy, non-union, mal-union,  neurologic dysfunction including numbness, DVT, pulmonary embolism, myocardial infarction, stroke " and death among others were discussed, and the patient elects to proceed.      Jose Alberto Caal MD

## 2024-09-15 NOTE — BRIEF OP NOTE
[facility] Surg  Brief Operative Note    SUMMARY     Surgery Date: 9/15/2024     Surgeons and Role:     * Jose Alberto Caal MD - Primary    Assisting Surgeon: None    Pre-op Diagnosis:  Closed left hip fracture [S72.002A]    Post-op Diagnosis:  Post-Op Diagnosis Codes:     * Closed left hip fracture [S72.002A]    Procedure(s) (LRB):  INSERTION, INTRAMEDULLARY GONZALEZ, FEMUR (Left)    Anesthesia: Choice    Implants:  * No implants in log *    Operative Findings: left hip intertroch fracture    Estimated Blood Loss: * No values recorded between 9/15/2024 10:50 AM and 9/15/2024 11:30 AM *    Estimated Blood Loss has not been documented. EBL = 100cc.         Specimens:   Specimen (24h ago, onward)      None            [id number]

## 2024-09-15 NOTE — ASSESSMENT & PLAN NOTE
Chronic, uncontrolled.   -resume home amlodipine   -BP uncontrolled, titrate home amlodipine. Increased to 10mg on 09/15  -IV hydralazine PRN for SBP>180    Latest blood pressure and vitals reviewed-     Temp:  [96.1 °F (35.6 °C)-98.5 °F (36.9 °C)]   Pulse:  [63-83]   Resp:  [13-21]   BP: (111-193)/(55-87)   SpO2:  [90 %-100 %] .   Home meds for hypertension were reviewed and noted below.   Hypertension Medications               amLODIPine (NORVASC) 5 MG tablet Take 1 tablet (5 mg total) by mouth once daily.            While in the hospital, will manage blood pressure as follows; Continue home antihypertensive regimen    Will utilize p.r.n. blood pressure medication only if patient's blood pressure greater than 180/110 and she develops symptoms such as worsening chest pain or shortness of breath.

## 2024-09-15 NOTE — PROGRESS NOTES
"[facility] Medicine  Progress Note    Patient Name: Marya Carranza  MRN: [id number]  Patient Class: IP- Inpatient   Admission Date: 9/14/2024  Length of Stay: 1 days  Attending Physician: Dick Ham DO  Primary Care Provider: Phillip Levy MD        Subjective:     Principal Problem:Closed fracture of left hip        HPI:   84 y.o. female, with a PMHx of DM, HTN, HLD presents ED via EMS with complaints of left hip pain after sustaining a fall today.  Stated that she tripped over her dog and fell forward landing on her left hip.  Denies any head injury, loss of consciousness, neck pain, numbness or tingling, or any new weaknesses.  Required help from her family to get back up, but unable to bear weight on her left hip due to the pain.  Denies any anticoagulation.Sister at bedside. Of note, she fell a few weeks ago because she was "dehydrated" and required staples in her head.     In the ED, patient was hypertensive with systolic blood pressure in the 190s.  No hypoxia.  Labs with normocytic anemia, appears to be near baseline.  Left hip x-ray and CT left hip with roximal left femoral fracture.  CT head with no acute intracranial abnormalities.  Chest x-ray with no large focal consolidation.  Orthopedic surgery was consulted in the ED (Dr. Greene spoke with Dr. Caal).  Admitted to hospital medicine for further evaluation and management    Overview/Hospital Course:  84 y.o. female admitted on 09/14/24 for acute proximal left femoral fracture.  Orthopedic surgery consulted-plan for intramedullary nailing of the left hip on 09/15/2024.  Likely will need PT/OT postoperatively.  Also found to have hypomagnesemia, electrolytes replaced as    Interval History:  No acute overnight events.  Patient remained afebrile.  Complains of significant left hip pain, better controlled with pain medications.  Plan for surgery today    Review of Systems   Constitutional:  Positive for activity change. " Negative for chills, fatigue and fever.   Respiratory:  Negative for cough, chest tightness and shortness of breath.    Cardiovascular:  Negative for chest pain.   Musculoskeletal:  Positive for arthralgias and gait problem. Negative for joint swelling.   Neurological:  Positive for weakness. Negative for dizziness, syncope and headaches.   Psychiatric/Behavioral:  Negative for confusion and hallucinations.      Objective:     Vital Signs (Most Recent):  Temp: 98.2 °F (36.8 °C) (09/15/24 0908)  Pulse: 79 (09/15/24 0930)  Resp: (!) 21 (09/15/24 0848)  BP: (!) 193/87 (09/15/24 0930)  SpO2: 97 % (09/15/24 0930) Vital Signs (24h Range):  Temp:  [96.1 °F (35.6 °C)-98.5 °F (36.9 °C)] 98.2 °F (36.8 °C)  Pulse:  [63-83] 79  Resp:  [13-21] 21  SpO2:  [90 %-100 %] 97 %  BP: (111-193)/(55-87) 193/87     Weight: 51 kg (112 lb 7 oz)  Body mass index is 19.92 kg/m².    Intake/Output Summary (Last 24 hours) at 9/15/2024 0959  Last data filed at 9/15/2024 0608  Gross per 24 hour   Intake 500 ml   Output 300 ml   Net 200 ml         Physical Exam  Vitals and nursing note reviewed.   Constitutional:       General: She is not in acute distress.     Appearance: She is not ill-appearing.   HENT:      Nose: Nose normal.      Mouth/Throat:      Mouth: Mucous membranes are moist.   Eyes:      Extraocular Movements: Extraocular movements intact.      Pupils: Pupils are equal, round, and reactive to light.   Cardiovascular:      Rate and Rhythm: Normal rate and regular rhythm.      Heart sounds: No murmur heard.  Pulmonary:      Effort: Pulmonary effort is normal. No respiratory distress.      Breath sounds: Normal breath sounds. No wheezing.   Abdominal:      General: There is no distension.      Palpations: Abdomen is soft.      Tenderness: There is no abdominal tenderness.   Musculoskeletal:         General: Tenderness (left hip pain) present. No swelling.      Right lower leg: No edema.      Left lower leg: No edema.      Comments: ROM  limited due to pain. She appears to be neurovascular intact distally to left lower extremity.   Skin:     General: Skin is warm and dry.   Neurological:      General: No focal deficit present.      Mental Status: She is alert and oriented to person, place, and time.   Psychiatric:         Mood and Affect: Mood normal.         Thought Content: Thought content normal.             Significant Labs: All pertinent labs within the past 24 hours have been reviewed.    Significant Imaging: I have reviewed all pertinent imaging results/findings within the past 24 hours.    Assessment/Plan:      * Closed fracture of left hip  -presented with left hip pain after a mechanical fall (she tripped over her puppy).   -Left hip x-ray and CT left hip with roximal left femoral fracture.    -CT head with no acute intracranial abnormalities.    -Chest x-ray with no large focal consolidation.    -Orthopedic surgery was consulted: plan for intramedullary nailing of the left hip on 09/15/2024.    -Likely will need PT/OT postoperatively.    Hypertension  Chronic, uncontrolled.   -resume home amlodipine   -BP uncontrolled, titrate home amlodipine. Increased to 10mg on 09/15  -IV hydralazine PRN for SBP>180    Latest blood pressure and vitals reviewed-     Temp:  [96.1 °F (35.6 °C)-98.5 °F (36.9 °C)]   Pulse:  [63-83]   Resp:  [13-21]   BP: (111-193)/(55-87)   SpO2:  [90 %-100 %] .   Home meds for hypertension were reviewed and noted below.   Hypertension Medications               amLODIPine (NORVASC) 5 MG tablet Take 1 tablet (5 mg total) by mouth once daily.            While in the hospital, will manage blood pressure as follows; Continue home antihypertensive regimen    Will utilize p.r.n. blood pressure medication only if patient's blood pressure greater than 180/110 and she develops symptoms such as worsening chest pain or shortness of breath.    Type 2 diabetes mellitus  Patient's FSGs are controlled on current medication regimen.  Last  A1c reviewed-   Lab Results   Component Value Date    HGBA1C 8.9 (H) 05/03/2011     Most recent fingerstick glucose reviewed-   Recent Labs   Lab 09/14/24  1518 09/14/24  2002 09/15/24  0147 09/15/24  0518   POCTGLUCOSE 162* 195* 169* 168*     Current correctional scale  Low  Maintain anti-hyperglycemic dose as follows-   Antihyperglycemics (From admission, onward)      Start     Stop Route Frequency Ordered    09/14/24 1608  insulin aspart U-100 pen 0-5 Units         -- SubQ Before meals & nightly PRN 09/14/24 1509          Hold Oral hypoglycemics while patient is in the hospital.  Repeat A1C  Meds: SSI PRN to maintain goal 140-180  ADA diet when appropriate, accuchecks ACHS, hypoglycemic protocol      HLD (hyperlipidemia)  -resume home pravastatin      Anemia  Anemia is likely due to Iron deficiency. Most recent hemoglobin and hematocrit are listed below.  Recent Labs     09/14/24  1410 09/15/24  0446   HGB 11.1* 9.9*   HCT 32.5* 30.0*       Plan  - Monitor serial CBC: Daily  - Transfuse PRBC if patient becomes hemodynamically unstable, symptomatic or H/H drops below 7/21.  - Patient has not received any PRBC transfusions to date  - Patient's anemia is currently stable  - monitor. Ordered iron profile    Hypomagnesemia  Patient has Abnormal Magnesium: hypomagnesemia. Will continue to monitor electrolytes closely. Will replace the affected electrolytes and repeat labs to be done after interventions completed. The patient's magnesium results have been reviewed and are listed below.  Recent Labs   Lab 09/15/24  0446   MG 1.2*        Advanced care planning/counseling discussion  Advance Care Planning    Date: 09/14/2024    Code Status  I engaged the the patient in a voluntary conversation about the patient's preferences for care  at the very end of life. The patient wishes to have CPR or other invasive treatments performed when her heart and/or breathing stops. I communicated to the patient that her wishes align with  full code status and she agrees. Daughter at bedside and present for discussion.     A total of 16 min was spent on advance care planning, goals of care discussion, emotional support, formulating and communicating prognosis and exploring burden/benefit of various approaches of treatment. This discussion occurred on a fully voluntary basis with the verbal consent of the patient and/or family.             VTE Risk Mitigation (From admission, onward)           Ordered     enoxaparin injection 40 mg  Daily         09/14/24 1509     IP VTE HIGH RISK PATIENT  Once         09/14/24 1509     Place sequential compression device  Until discontinued         09/14/24 1509                    Discharge Planning   OSCAR:      Code Status: Full Code   Is the patient medically ready for discharge?:     Reason for patient still in hospital (select all that apply): Patient trending condition, Treatment, Consult recommendations, and PT / OT recommendations  Discharge Plan A: Home with family                  DebbiePrince Ham DO  Department of Hospital Medicine   [facility]

## 2024-09-15 NOTE — NURSING
Received handoff report from morning shift. Patient lying on bed AAOx4, on RA, no acute distress noted, breathing even and unlabored.  Safety precautions maintained. Care assumed at this time.

## 2024-09-15 NOTE — CARE UPDATE
Nurse reported patient complaining of abdominal pain associated with belching and nausea. EKG obtained. No acute ischemic changes. BP noted to be in the SBP in the 60s briefly but quickly improved. Repeat CBC with Hgb of 8.6, down from 9.9 this AM. Will not transfuse at this time. She states it feels like she has something in her stomach that she needs to get up. Pain is reproducible, in the u pper abdomen. Will order XR abdomen and CXR to be complete. Hx of GERD. Sounds like possible reflux. Will order simethicone. Also ask the nurse to give the IV mag that was ordered prior to surgery.

## 2024-09-15 NOTE — ASSESSMENT & PLAN NOTE
-presented with left hip pain after a mechanical fall (she tripped over her puppy).   -Left hip x-ray and CT left hip with roximal left femoral fracture.    -CT head with no acute intracranial abnormalities.    -Chest x-ray with no large focal consolidation.    -Orthopedic surgery was consulted: plan for intramedullary nailing of the left hip on 09/15/2024.    -Likely will need PT/OT postoperatively.

## 2024-09-15 NOTE — PLAN OF CARE
Problem: Adult Inpatient Plan of Care  Goal: Plan of Care Review  Outcome: Progressing  Goal: Optimal Comfort and Wellbeing  Outcome: Progressing  Intervention: Monitor Pain and Promote Comfort  Flowsheets (Taken 9/15/2024 3209)  Pain Management Interventions:   care clustered   position adjusted

## 2024-09-15 NOTE — ANESTHESIA PROCEDURE NOTES
Spinal    Diagnosis: L Femur fracture  Patient location during procedure: OR  Start time: 9/15/2024 11:23 AM  Timeout: 9/15/2024 10:22 AM  End time: 9/15/2024 10:34 AM    Staffing  Authorizing Provider: Roman Sandy MD  Performing Provider: Roman Sandy MD    Staffing  Performed by: Roman Sandy MD  Authorized by: Roman Sandy MD    Preanesthetic Checklist  Completed: patient identified, IV checked, site marked, risks and benefits discussed, surgical consent, monitors and equipment checked, pre-op evaluation and timeout performed  Spinal Block  Patient position: right lateral decubitus  Prep: ChloraPrep  Patient monitoring: heart rate, cardiac monitor, continuous pulse ox and frequent blood pressure checks  Approach: midline  Location: L3-4  Injection technique: single shot  CSF Fluid: clear free-flowing CSF  Needle  Needle type: Shelliecke   Needle gauge: 22 G  Needle length: 3.5 in  Additional Documentation: no paresthesia on injection  Needle localization: anatomical landmarks  Assessment  Sensory level: T9   Dermatomal levels determined by pinch or prick  Ease of block: moderate  Patient's tolerance of the procedure: comfortable throughout block  Medications:    Medications: bupivacaine (pf) (MARCAINE) injection 0.5% - Intraspinal   2.5 mL - 9/15/2024 10:30:00 AM

## 2024-09-15 NOTE — NURSING
Patient transported to OR via bed to scheduled procedure. Pre-op vitals: /81 automatic with a manual recheck of 172/80, oral temp 98.2, O2 93% on RA, pulse 80. NAD noted.

## 2024-09-15 NOTE — HOSPITAL COURSE
84 y.o. female admitted on 09/14/24 for acute proximal left femoral fracture.  Orthopedic surgery consulted-s/p intramedullary nailing of the left hip on 09/15/2024.  PT/OT consulted postoperatively-recommends moderate intensity there.  Also found to have hypomagnesemia, electrolytes replaced as needed. Hgb 6.6 on 09/16, transfused 1U pRBC oon 09/16.  Continue symptomatic management and supportive care.  Pain control.  Overnight on 09/16, patient with acute hypoxia with O2 sats in the 80s, requiring nasal cannula with improvement.  History of diastolic heart failure.  BNP 1062.  Of note patient received fluids postoperatively and also unit of blood on 09/16, likely overloaded. CXR w/Right basilar opacity, which may be related to atelectasis or early infiltrate.  Given IV Lasix with improvement.  Encourage IS.  Wean O2 as tolerated.  Patient medically stable, awaiting skilled nursing facility placed. Now accepted to [facility].    Still with some left hip pain, but well controlled. Pt denies any fever, headaches, vision changes, chest pain, shortness of breath, palpitations, abdominal pain, nausea, vomiting, or any new weaknesses. Feels ready to go to SNF. Patient's exam on discharge was as follow: Patient is alert and oriented, appears in no acute distress, heart with regular rate and rhythm, lungs- RLL breath sounds improving , abdomen soft, and nontender. Bilateral lower extremities without any edema or calf tenderness.  Incision/ dressing clean/dry/intact     Patient was counseled regarding any abnormal labs, differential diagnosis, treatment options, risk-benefit, lifestyle changes, prognosis, current condition, and medications. Patient was interactive and attentive.  Patient's questions were answered in a respectful and timely manner. Patient was instructed to follow-up with PCP within 1 week after SNF and to continue taking medications as prescribed.  Instructed to also follow up with Orthopedic surgery for  post operative follow. Also, extensively discussed the risks, benefits, and side effects of patient's medications. Discussed with patient about any medication changes. Patient verbalized understanding and agrees to treatment plan.  Patient is stable for discharge.  Patient has no other questions or concerns at this time.  ED precautions discussed with the patient.    Vital signs are stable. Tolerating p.o. intake without any nausea or vomiting. Afebrile for over 24 hours. Patient is in stable condition and has no questions or concerns. Patient will be discharge to skilled nursing facility once accepted and facility is prepared for patient's arrival .  Plan of care completed.  CM/SW to assist with discharge planning.      Vitals:    09/20/24 0741 09/20/24 0817 09/20/24 1115 09/20/24 1354   BP:   (!) 171/72    BP Location:       Patient Position:   Lying    Pulse: 87 87 97    Resp:   16 20   Temp:   97.2 °F (36.2 °C)    TempSrc:   Axillary    SpO2:   (!) 92%    Weight:       Height:

## 2024-09-15 NOTE — PLAN OF CARE
Problem: Adult Inpatient Plan of Care  Goal: Optimal Comfort and Wellbeing  Intervention: Monitor Pain and Promote Comfort  Flowsheets (Taken 9/15/2024 0322)  Pain Management Interventions:   care clustered   medication offered   position adjusted   pillow support provided   quiet environment facilitated   warm blanket provided     Problem: Diabetes Comorbidity  Goal: Blood Glucose Level Within Targeted Range  Intervention: Monitor and Manage Glycemia  Flowsheets (Taken 9/15/2024 0322)  Glycemic Management: blood glucose monitored     Problem: Fall Injury Risk  Goal: Absence of Fall and Fall-Related Injury  Intervention: Promote Injury-Free Environment  Flowsheets (Taken 9/15/2024 0322)  Safety Promotion/Fall Prevention:   assistive device/personal item within reach   bed alarm set   medications reviewed   side rails raised x 2   room near unit station     NAD throughout the night

## 2024-09-15 NOTE — NURSING
Received report from Veronique Tesfaye in OR. Patient tolerated Left hip nailing well. No drainage to incisions. BP 97/47, HR 81 O2 91% on RA. Awaiting patient's arrival to floor.

## 2024-09-15 NOTE — ASSESSMENT & PLAN NOTE
Patient has Abnormal Magnesium: hypomagnesemia. Will continue to monitor electrolytes closely. Will replace the affected electrolytes and repeat labs to be done after interventions completed. The patient's magnesium results have been reviewed and are listed below.  Recent Labs   Lab 09/15/24  0446   MG 1.2*

## 2024-09-15 NOTE — NURSING
Patient arrived to floor in stable condition. Call bell within reach, family at bedside and bed in lowest position. Patient resting.  Visualized and assessed patient's overall condition and appearance. No complaints and acute distress noted. Will continue to monitor throughout shift.

## 2024-09-15 NOTE — ASSESSMENT & PLAN NOTE
Patient's FSGs are controlled on current medication regimen.  Last A1c reviewed-   Lab Results   Component Value Date    HGBA1C 8.9 (H) 05/03/2011     Most recent fingerstick glucose reviewed-   Recent Labs   Lab 09/14/24  1518 09/14/24  2002 09/15/24  0147 09/15/24  0518   POCTGLUCOSE 162* 195* 169* 168*     Current correctional scale  Low  Maintain anti-hyperglycemic dose as follows-   Antihyperglycemics (From admission, onward)      Start     Stop Route Frequency Ordered    09/14/24 1608  insulin aspart U-100 pen 0-5 Units         -- SubQ Before meals & nightly PRN 09/14/24 1509          Hold Oral hypoglycemics while patient is in the hospital.  Repeat A1C  Meds: SSI PRN to maintain goal 140-180  ADA diet when appropriate, accuchecks ACHS, hypoglycemic protocol

## 2024-09-15 NOTE — OR NURSING
EKG completed and in and out catheter completed with 100ml clear yellow . Dr Sandy at bedside. Pt remains uncomfortable but says it doesn't hurt but is pressure at top of stomach,

## 2024-09-15 NOTE — SUBJECTIVE & OBJECTIVE
Interval History:  No acute overnight events.  Patient remained afebrile.  Complains of significant left hip pain, better controlled with pain medications.  Plan for surgery today    Review of Systems   Constitutional:  Positive for activity change. Negative for chills, fatigue and fever.   Respiratory:  Negative for cough, chest tightness and shortness of breath.    Cardiovascular:  Negative for chest pain.   Musculoskeletal:  Positive for arthralgias and gait problem. Negative for joint swelling.   Neurological:  Positive for weakness. Negative for dizziness, syncope and headaches.   Psychiatric/Behavioral:  Negative for confusion and hallucinations.      Objective:     Vital Signs (Most Recent):  Temp: 98.2 °F (36.8 °C) (09/15/24 0908)  Pulse: 79 (09/15/24 0930)  Resp: (!) 21 (09/15/24 0848)  BP: (!) 193/87 (09/15/24 0930)  SpO2: 97 % (09/15/24 0930) Vital Signs (24h Range):  Temp:  [96.1 °F (35.6 °C)-98.5 °F (36.9 °C)] 98.2 °F (36.8 °C)  Pulse:  [63-83] 79  Resp:  [13-21] 21  SpO2:  [90 %-100 %] 97 %  BP: (111-193)/(55-87) 193/87     Weight: 51 kg (112 lb 7 oz)  Body mass index is 19.92 kg/m².    Intake/Output Summary (Last 24 hours) at 9/15/2024 0959  Last data filed at 9/15/2024 0608  Gross per 24 hour   Intake 500 ml   Output 300 ml   Net 200 ml         Physical Exam  Vitals and nursing note reviewed.   Constitutional:       General: She is not in acute distress.     Appearance: She is not ill-appearing.   HENT:      Nose: Nose normal.      Mouth/Throat:      Mouth: Mucous membranes are moist.   Eyes:      Extraocular Movements: Extraocular movements intact.      Pupils: Pupils are equal, round, and reactive to light.   Cardiovascular:      Rate and Rhythm: Normal rate and regular rhythm.      Heart sounds: No murmur heard.  Pulmonary:      Effort: Pulmonary effort is normal. No respiratory distress.      Breath sounds: Normal breath sounds. No wheezing.   Abdominal:      General: There is no distension.       Palpations: Abdomen is soft.      Tenderness: There is no abdominal tenderness.   Musculoskeletal:         General: Tenderness (left hip pain) present. No swelling.      Right lower leg: No edema.      Left lower leg: No edema.      Comments: ROM limited due to pain. She appears to be neurovascular intact distally to left lower extremity.   Skin:     General: Skin is warm and dry.   Neurological:      General: No focal deficit present.      Mental Status: She is alert and oriented to person, place, and time.   Psychiatric:         Mood and Affect: Mood normal.         Thought Content: Thought content normal.             Significant Labs: All pertinent labs within the past 24 hours have been reviewed.    Significant Imaging: I have reviewed all pertinent imaging results/findings within the past 24 hours.

## 2024-09-15 NOTE — NURSING
[facility]  Nurses Note -- 4 Eyes      9/15/2024       Skin assessed on: Q Shift      [x] No Pressure Injuries Present    []Prevention Measures Documented    [] Yes LDA  for Pressure Injury Previously documented     [] Yes New Pressure Injury Discovered   [] LDA for New Pressure Injury Added      Attending RN:  Merlin Dejesus RN     Second RN:  HARSHAD Spicer

## 2024-09-15 NOTE — NURSING
[facility]  Nurses Note -- 4 Eyes      9/15/2024       Skin assessed on: Q Shift      [x] No Pressure Injuries Present    []Prevention Measures Documented    [] Yes LDA  for Pressure Injury Previously documented     [] Yes New Pressure Injury Discovered   [] LDA for New Pressure Injury Added      Attending RN:  Tanesha Gannon LPN     Second RN:  BITA Boyer

## 2024-09-15 NOTE — OP NOTE
09/15/2024    PREOPERATIVE DIAGNOSIS:  Left intertrochanteric hip fracture.    POSTOPERATIVE DIAGNOSIS:  Left intertrochanteric hip fracture.    PROCEDURE:  Left hip IM nailing for intertrochanteric hip fracture. (CPT# 93196)    SURGEON:  Jose Alberto Caal M.D.    ASSISTANT:   Emilie miner.    ANESTHESIA:  spinal.    ESTIMATED BLOOD LOSS:  100 mL    INDICATIONS:  The patient is a 84 y.o. who fell previously.  Clinical evaluation was consistent with hip pathology and radiographs revealed an intertrochanteric hip fracture.  She was admitted to the hospital where preoperative medical clearance was obtained and it was recommended at this time to proceed with intramedullary nailing.  Risks and complications were discussed including, but not limited to risks of anesthetic complications, infections, wound healing complications, nonunion, malunion, hardware failure, pain, stiffness, DVT, pulmonary embolism and death among others and she elected to proceed.    DESCRIPTION OF PROCEDURE:  The patient was taken Operating Room where anesthesia was administered by the Anesthesia Department.  She was then placed in the fracture table and all superficial neurovascular structures were well padded.  The left lower extremity was then sterilely prepped and draped in the normal fashion after fluroscopy was used to confirm adequate reduction.   .    A 4 cm longitudinal incision was made just proximal to the greater trochanter.  The subcutaneous tissue and gluteal fascia were incised.  A threaded guide pin was then placed in the tip of the greater trochanter and extended and introduced into the proximal femur under AP and lateral fluoroscopy.  The Synthes one-step reamer was then used to ream proximally.  A Synthes short TFN size 10 mm nail was then passed in to the femur.  The proximal interlocking device was then placed and a 3-cm longitudinal incision was made over the lateral aspect of the proximal thigh and the fascia wendy was  incised.  A threaded guide pin was then placed through the lateral cortex of the femur through the femoral neck and into the femoral head and measured at 90 mm and over reamed.  A 90 mm screw was then placed under fluoroscopy and satisfactory position was noted on AP and lateral fluoroscopy and the setscrew was then set. The proximal interlocking device was then removed.    Distal interlocking was done with a single lateral to medial interlocking screw through a 1 cm incision under fluoroscopic guidance.  All wounds were then thoroughly irrigated.  Subcutaneous tissues were closed with interrupted inverted of #3-0 Vicryl.  Skin was approximated using skin staples.  Sterile dressing was applied.  Anesthesia was reversed and she was returned to the Postanesthesia Care Unit in stable condition.    As the attending surgeon I was physically present for the key/critical portions of the procedure.

## 2024-09-15 NOTE — TRANSFER OF CARE
"Anesthesia Transfer of Care Note    Patient: Marya Carranza    Procedure(s) Performed: Procedure(s) (LRB):  INSERTION, INTRAMEDULLARY GONZALEZ, FEMUR (Left)    Patient location: PACU    Anesthesia Type: spinal and MAC    Transport from OR: Transported from OR on 2-3 L/min O2 by NC with adequate spontaneous ventilation    Post pain: adequate analgesia    Post assessment: no apparent anesthetic complications and tolerated procedure well    Post vital signs: stable    Level of consciousness: awake, alert and oriented    Nausea/Vomiting: no nausea/vomiting    Complications: none    Transfer of care protocol was followed    Last vitals: Visit Vitals  BP (!) 129/58 (BP Location: Right arm)   Pulse 93   Temp 36.7 °C (98.1 °F) (Oral)   Resp 17   Ht 5' 3" (1.6 m)   Wt 51 kg (112 lb 7 oz)   SpO2 97%   Breastfeeding No   BMI 19.92 kg/m²     "

## 2024-09-15 NOTE — ASSESSMENT & PLAN NOTE
Anemia is likely due to Iron deficiency. Most recent hemoglobin and hematocrit are listed below.  Recent Labs     09/14/24  1410 09/15/24  0446   HGB 11.1* 9.9*   HCT 32.5* 30.0*       Plan  - Monitor serial CBC: Daily  - Transfuse PRBC if patient becomes hemodynamically unstable, symptomatic or H/H drops below 7/21.  - Patient has not received any PRBC transfusions to date  - Patient's anemia is currently stable  - monitor. Ordered iron profile

## 2024-09-16 LAB
ABO + RH BLD: NORMAL
ALLENS TEST: ABNORMAL
ANION GAP SERPL CALC-SCNC: 11 MMOL/L (ref 8–16)
BASOPHILS # BLD AUTO: 0.02 K/UL (ref 0–0.2)
BASOPHILS NFR BLD: 0.2 % (ref 0–1.9)
BLD GP AB SCN CELLS X3 SERPL QL: NORMAL
BLD PROD TYP BPU: NORMAL
BLOOD UNIT EXPIRATION DATE: NORMAL
BLOOD UNIT TYPE CODE: 9500
BLOOD UNIT TYPE: NORMAL
BNP SERPL-MCNC: 1062 PG/ML (ref 0–99)
BUN SERPL-MCNC: 17 MG/DL (ref 8–23)
CALCIUM SERPL-MCNC: 8.5 MG/DL (ref 8.7–10.5)
CHLORIDE SERPL-SCNC: 100 MMOL/L (ref 95–110)
CO2 SERPL-SCNC: 23 MMOL/L (ref 23–29)
CODING SYSTEM: NORMAL
CREAT SERPL-MCNC: 0.8 MG/DL (ref 0.5–1.4)
CROSSMATCH INTERPRETATION: NORMAL
DELSYS: ABNORMAL
DIFFERENTIAL METHOD BLD: ABNORMAL
DISPENSE STATUS: NORMAL
EOSINOPHIL # BLD AUTO: 0 K/UL (ref 0–0.5)
EOSINOPHIL NFR BLD: 0.1 % (ref 0–8)
ERYTHROCYTE [DISTWIDTH] IN BLOOD BY AUTOMATED COUNT: 13.9 % (ref 11.5–14.5)
EST. GFR  (NO RACE VARIABLE): >60 ML/MIN/1.73 M^2
FIO2: 32
FLOW: 3
GLUCOSE SERPL-MCNC: 204 MG/DL (ref 70–110)
HCO3 UR-SCNC: 25.4 MMOL/L (ref 24–28)
HCT VFR BLD AUTO: 20.9 % (ref 37–48.5)
HCT VFR BLD AUTO: 25.3 % (ref 37–48.5)
HCT VFR BLD AUTO: 26.6 % (ref 37–48.5)
HGB BLD-MCNC: 6.6 G/DL (ref 12–16)
HGB BLD-MCNC: 8.6 G/DL (ref 12–16)
HGB BLD-MCNC: 8.8 G/DL (ref 12–16)
IMM GRANULOCYTES # BLD AUTO: 0.04 K/UL (ref 0–0.04)
IMM GRANULOCYTES NFR BLD AUTO: 0.5 % (ref 0–0.5)
LYMPHOCYTES # BLD AUTO: 0.8 K/UL (ref 1–4.8)
LYMPHOCYTES NFR BLD: 10.2 % (ref 18–48)
MAGNESIUM SERPL-MCNC: 1.7 MG/DL (ref 1.6–2.6)
MCH RBC QN AUTO: 28.6 PG (ref 27–31)
MCHC RBC AUTO-ENTMCNC: 31.6 G/DL (ref 32–36)
MCV RBC AUTO: 91 FL (ref 82–98)
MODE: ABNORMAL
MONOCYTES # BLD AUTO: 1.2 K/UL (ref 0.3–1)
MONOCYTES NFR BLD: 14.2 % (ref 4–15)
NEUTROPHILS # BLD AUTO: 6.1 K/UL (ref 1.8–7.7)
NEUTROPHILS NFR BLD: 74.8 % (ref 38–73)
NRBC BLD-RTO: 0 /100 WBC
PCO2 BLDA: 37.1 MMHG (ref 35–45)
PH SMN: 7.44 [PH] (ref 7.35–7.45)
PHOSPHATE SERPL-MCNC: 3.8 MG/DL (ref 2.7–4.5)
PLATELET # BLD AUTO: 209 K/UL (ref 150–450)
PMV BLD AUTO: 10.5 FL (ref 9.2–12.9)
PO2 BLDA: 49 MMHG (ref 80–100)
POC BE: 1 MMOL/L
POC SATURATED O2: 86 % (ref 95–100)
POC TCO2: 26 MMOL/L (ref 23–27)
POCT GLUCOSE: 199 MG/DL (ref 70–110)
POCT GLUCOSE: 227 MG/DL (ref 70–110)
POCT GLUCOSE: 253 MG/DL (ref 70–110)
POCT GLUCOSE: 253 MG/DL (ref 70–110)
POCT GLUCOSE: 282 MG/DL (ref 70–110)
POTASSIUM SERPL-SCNC: 4 MMOL/L (ref 3.5–5.1)
RBC # BLD AUTO: 2.31 M/UL (ref 4–5.4)
SAMPLE: ABNORMAL
SITE: ABNORMAL
SODIUM SERPL-SCNC: 134 MMOL/L (ref 136–145)
SP02: 93
SPECIMEN OUTDATE: NORMAL
TRANS ERYTHROCYTES VOL PATIENT: NORMAL ML
WBC # BLD AUTO: 8.17 K/UL (ref 3.9–12.7)

## 2024-09-16 PROCEDURE — 86920 COMPATIBILITY TEST SPIN: CPT | Performed by: STUDENT IN AN ORGANIZED HEALTH CARE EDUCATION/TRAINING PROGRAM

## 2024-09-16 PROCEDURE — 97530 THERAPEUTIC ACTIVITIES: CPT

## 2024-09-16 PROCEDURE — 25000003 PHARM REV CODE 250: Performed by: ORTHOPAEDIC SURGERY

## 2024-09-16 PROCEDURE — 63600175 PHARM REV CODE 636 W HCPCS: Performed by: ORTHOPAEDIC SURGERY

## 2024-09-16 PROCEDURE — 86900 BLOOD TYPING SEROLOGIC ABO: CPT | Performed by: STUDENT IN AN ORGANIZED HEALTH CARE EDUCATION/TRAINING PROGRAM

## 2024-09-16 PROCEDURE — 84100 ASSAY OF PHOSPHORUS: CPT | Performed by: ORTHOPAEDIC SURGERY

## 2024-09-16 PROCEDURE — 94760 N-INVAS EAR/PLS OXIMETRY 1: CPT

## 2024-09-16 PROCEDURE — 36430 TRANSFUSION BLD/BLD COMPNT: CPT

## 2024-09-16 PROCEDURE — 36415 COLL VENOUS BLD VENIPUNCTURE: CPT | Mod: XB | Performed by: INTERNAL MEDICINE

## 2024-09-16 PROCEDURE — P9021 RED BLOOD CELLS UNIT: HCPCS | Performed by: STUDENT IN AN ORGANIZED HEALTH CARE EDUCATION/TRAINING PROGRAM

## 2024-09-16 PROCEDURE — 83880 ASSAY OF NATRIURETIC PEPTIDE: CPT | Performed by: INTERNAL MEDICINE

## 2024-09-16 PROCEDURE — 85025 COMPLETE CBC W/AUTO DIFF WBC: CPT | Performed by: ORTHOPAEDIC SURGERY

## 2024-09-16 PROCEDURE — 25000003 PHARM REV CODE 250: Performed by: STUDENT IN AN ORGANIZED HEALTH CARE EDUCATION/TRAINING PROGRAM

## 2024-09-16 PROCEDURE — 30233N1 TRANSFUSION OF NONAUTOLOGOUS RED BLOOD CELLS INTO PERIPHERAL VEIN, PERCUTANEOUS APPROACH: ICD-10-PCS | Performed by: ORTHOPAEDIC SURGERY

## 2024-09-16 PROCEDURE — 83735 ASSAY OF MAGNESIUM: CPT | Performed by: ORTHOPAEDIC SURGERY

## 2024-09-16 PROCEDURE — 36415 COLL VENOUS BLD VENIPUNCTURE: CPT | Performed by: STUDENT IN AN ORGANIZED HEALTH CARE EDUCATION/TRAINING PROGRAM

## 2024-09-16 PROCEDURE — 97165 OT EVAL LOW COMPLEX 30 MIN: CPT

## 2024-09-16 PROCEDURE — 21400001 HC TELEMETRY ROOM

## 2024-09-16 PROCEDURE — 36415 COLL VENOUS BLD VENIPUNCTURE: CPT | Mod: XB | Performed by: ORTHOPAEDIC SURGERY

## 2024-09-16 PROCEDURE — 85018 HEMOGLOBIN: CPT | Mod: 91 | Performed by: INTERNAL MEDICINE

## 2024-09-16 PROCEDURE — 80048 BASIC METABOLIC PNL TOTAL CA: CPT | Performed by: ORTHOPAEDIC SURGERY

## 2024-09-16 PROCEDURE — 85014 HEMATOCRIT: CPT | Performed by: STUDENT IN AN ORGANIZED HEALTH CARE EDUCATION/TRAINING PROGRAM

## 2024-09-16 PROCEDURE — A4216 STERILE WATER/SALINE, 10 ML: HCPCS | Performed by: ORTHOPAEDIC SURGERY

## 2024-09-16 PROCEDURE — 86901 BLOOD TYPING SEROLOGIC RH(D): CPT | Performed by: STUDENT IN AN ORGANIZED HEALTH CARE EDUCATION/TRAINING PROGRAM

## 2024-09-16 PROCEDURE — 63600175 PHARM REV CODE 636 W HCPCS: Performed by: INTERNAL MEDICINE

## 2024-09-16 PROCEDURE — 85014 HEMATOCRIT: CPT | Mod: 91 | Performed by: INTERNAL MEDICINE

## 2024-09-16 PROCEDURE — 85018 HEMOGLOBIN: CPT | Performed by: STUDENT IN AN ORGANIZED HEALTH CARE EDUCATION/TRAINING PROGRAM

## 2024-09-16 PROCEDURE — 97163 PT EVAL HIGH COMPLEX 45 MIN: CPT

## 2024-09-16 RX ORDER — FUROSEMIDE 10 MG/ML
20 INJECTION INTRAMUSCULAR; INTRAVENOUS ONCE
Status: COMPLETED | OUTPATIENT
Start: 2024-09-16 | End: 2024-09-16

## 2024-09-16 RX ORDER — PANTOPRAZOLE SODIUM 40 MG/1
40 TABLET, DELAYED RELEASE ORAL DAILY
Status: DISCONTINUED | OUTPATIENT
Start: 2024-09-16 | End: 2024-09-20 | Stop reason: HOSPADM

## 2024-09-16 RX ORDER — LANOLIN ALCOHOL/MO/W.PET/CERES
800 CREAM (GRAM) TOPICAL ONCE
Status: COMPLETED | OUTPATIENT
Start: 2024-09-16 | End: 2024-09-16

## 2024-09-16 RX ORDER — FAMOTIDINE 20 MG/1
20 TABLET, FILM COATED ORAL
Status: DISCONTINUED | OUTPATIENT
Start: 2024-09-16 | End: 2024-09-19

## 2024-09-16 RX ORDER — HYDROCODONE BITARTRATE AND ACETAMINOPHEN 500; 5 MG/1; MG/1
TABLET ORAL
Status: DISCONTINUED | OUTPATIENT
Start: 2024-09-16 | End: 2024-09-20 | Stop reason: HOSPADM

## 2024-09-16 RX ADMIN — DOCUSATE SODIUM 100 MG: 100 CAPSULE, LIQUID FILLED ORAL at 09:09

## 2024-09-16 RX ADMIN — PANTOPRAZOLE SODIUM 40 MG: 40 TABLET, DELAYED RELEASE ORAL at 08:09

## 2024-09-16 RX ADMIN — FAMOTIDINE 20 MG: 20 TABLET ORAL at 12:09

## 2024-09-16 RX ADMIN — AMLODIPINE BESYLATE 10 MG: 5 TABLET ORAL at 08:09

## 2024-09-16 RX ADMIN — SODIUM CHLORIDE: 9 INJECTION, SOLUTION INTRAVENOUS at 09:09

## 2024-09-16 RX ADMIN — ONDANSETRON 4 MG: 2 INJECTION INTRAMUSCULAR; INTRAVENOUS at 03:09

## 2024-09-16 RX ADMIN — FUROSEMIDE 20 MG: 10 INJECTION, SOLUTION INTRAMUSCULAR; INTRAVENOUS at 10:09

## 2024-09-16 RX ADMIN — CEFAZOLIN 2 G: 2 INJECTION, POWDER, FOR SOLUTION INTRAMUSCULAR; INTRAVENOUS at 03:09

## 2024-09-16 RX ADMIN — PRAVASTATIN SODIUM 40 MG: 40 TABLET ORAL at 08:09

## 2024-09-16 RX ADMIN — INSULIN ASPART 1 UNITS: 100 INJECTION, SOLUTION INTRAVENOUS; SUBCUTANEOUS at 10:09

## 2024-09-16 RX ADMIN — MUPIROCIN: 20 OINTMENT TOPICAL at 09:09

## 2024-09-16 RX ADMIN — HYDROCODONE BITARTRATE AND ACETAMINOPHEN 1 TABLET: 7.5; 325 TABLET ORAL at 08:09

## 2024-09-16 RX ADMIN — MUPIROCIN: 20 OINTMENT TOPICAL at 08:09

## 2024-09-16 RX ADMIN — ENOXAPARIN SODIUM 40 MG: 40 INJECTION SUBCUTANEOUS at 06:09

## 2024-09-16 RX ADMIN — HYDROCODONE BITARTRATE AND ACETAMINOPHEN 1 TABLET: 7.5; 325 TABLET ORAL at 09:09

## 2024-09-16 RX ADMIN — DOCUSATE SODIUM 100 MG: 100 CAPSULE, LIQUID FILLED ORAL at 08:09

## 2024-09-16 RX ADMIN — Medication 10 ML: at 09:09

## 2024-09-16 RX ADMIN — FAMOTIDINE 20 MG: 20 TABLET ORAL at 07:09

## 2024-09-16 RX ADMIN — Medication 800 MG: at 08:09

## 2024-09-16 RX ADMIN — MORPHINE SULFATE 2 MG: 4 INJECTION, SOLUTION INTRAMUSCULAR; INTRAVENOUS at 02:09

## 2024-09-16 NOTE — NURSING
Received handoff report from morning shift. Patient lying on bed, on RA, no acute distress noted, breathing even and unlabored. On purewick. Safety precautions maintained. Care assumed at this time.

## 2024-09-16 NOTE — PLAN OF CARE
Discharge Recommendations: Moderate intensity     Problem: Physical Therapy  Goal: Physical Therapy Goal  Description: Goals to be met by: 2024     Patient will increase functional independence with mobility by performin. Supine to sit with MInimal Assistance  2. Sit to supine with MInimal Assistance  3. Sit to stand transfer with Minimal Assistance  4. Bed to chair transfer with Minimal Assistance using Rolling Walker  5. Gait  x 100 feet with Minimal Assistance using Rolling Walker.     Outcome: Progressing

## 2024-09-16 NOTE — PT/OT/SLP PROGRESS
Physical Therapy      Patient Name:  Marya Carranza   MRN:  [id number]    Patient not seen today secondary to Blood transfusion, Other (Hemoglobin 6.6). Will follow-up this afternoon.

## 2024-09-16 NOTE — PROGRESS NOTES
Ortho Daily Progress Note    Marya Carranza is a 84 y.o. female admitted on 9/14/2024      Chief Complaint/Reason for admission: Fall (Pt reports to ED via [facility] following a trip and fall over dog, complaining og left hip pain. No shortening or rotation. Pain on movement and palpation, denies LOC or hitting head)       Hospital Day: 2  Post Op Day: 1 Day Post-Op     The patient was seen and examined this morning at the bedside. Patient reports no acute issues overnight.  Patient reports that pain is adequately controlled.    _______________    Vitals:    09/16/24 0658 09/16/24 0749 09/16/24 0817 09/16/24 0820   BP: (!) 138/56      Pulse: 108 (!) 114     Resp: 16  16    Temp: 98.4 °F (36.9 °C)      TempSrc: Oral      SpO2: (!) 93%   (!) 92%   Weight:       Height:           Vital Signs (Most Recent)  Temp: 98.4 °F (36.9 °C) (09/16/24 0658)  Pulse: (!) 114 (09/16/24 0749)  Resp: 16 (09/16/24 0817)  BP: (!) 138/56 (09/16/24 0658)  SpO2: (!) 92 % (09/16/24 0820)    Vital Signs Range (Last 24H):  Temp:  [97.5 °F (36.4 °C)-98.4 °F (36.9 °C)]   Pulse:  []   Resp:  [16-33]   BP: ()/(35-87)   SpO2:  [91 %-100 %]       Physical:    AAOx3  Incision/ dressing clean/dry/intact  NVI Distally  Palpable distal pulses  CR<3sec      Recent Labs     09/14/24  1410 09/15/24  0446 09/15/24  1401 09/16/24  0611   K 4.1 3.8  --  4.0   MG  --  1.2*  --  1.7   PHOS  --  4.8*  --  3.8   CALCIUM 9.8 8.8  --  8.5*   WBC 7.91 7.43 7.74 8.17   HGB 11.1* 9.9* 8.6* 6.6*   HCT 32.5* 30.0* 26.1* 20.9*    223 219 209       I/O last 3 completed shifts:  In: 1150 [IV Piggyback:1150]  Out: 550 [Urine:400; Blood:150]          Assessment:  A/P status post left hip intramedullary nailing             Plan:    PT/OT:  Weightbearing as tolerated  Expected postoperative anemia  Patient will plan for transfusion today.  Pain Control  DVT Prophylaxis:     Discharge Plan:  Possibly skilled nursing        Jose Alberto Caal MD  [facility]

## 2024-09-16 NOTE — PT/OT/SLP EVAL
Occupational Therapy   Evaluation    Name: Marya Carranza  MRN: [id number]  Admitting Diagnosis: Closed fracture of left hip  Recent Surgery: Procedure(s) (LRB):  INSERTION, INTRAMEDULLARY GONZALEZ, FEMUR (Left) 1 Day Post-Op    Recommendations:     Discharge Recommendations: Moderate Intensity Therapy  Discharge Equipment Recommendations:  to be determined by next level of care  Barriers to discharge:  Pt is at high risk for falls, readmission and morbidity of d/c home.      Assessment:     Marya Carranza is a 84 y.o. female with a medical diagnosis of Closed fracture of left hip.  Performance deficits affecting function: weakness, impaired endurance, impaired self care skills, impaired functional mobility, gait instability, impaired balance, decreased upper extremity function, decreased lower extremity function, decreased coordination, decreased safety awareness, pain, orthopedic precautions, decreased ROM.      Pt very pleasant and willing to participate in tx session this date; pt w/ significant pain, requiring max A x 2 persons for supine>sit, sit>stand and laterally stepping to HOB. Pt will continue to benefit from skilled acute OT services to maximize functional capacity.     Rehab Prognosis: Good; patient would benefit from acute skilled OT services to address these deficits and reach maximum level of function.       Plan:     Patient to be seen  (3-5x/wk) to address the above listed problems via self-care/home management, therapeutic exercises, therapeutic activities  Plan of Care Expires: 09/30/24  Plan of Care Reviewed with: patient    Subjective     Chief Complaint: pain   Patient/Family Comments/goals: agreeable to participate     Occupational Profile:  Living Environment: Pt lives alone in [facility].   Previous level of function: Pt was mod I w/ ADLs and functional mobility. Driving.   Roles and Routines: None stated   Equipment Used at Home: cane, quad  Assistance upon Discharge: self; dtr     Pain/Comfort:  Pain  Rating 1: 10/10  Location - Side 1: Left  Location 1: hip  Pain Addressed 1: Reposition, Distraction, Cessation of Activity    Patients cultural, spiritual, Yazdanism conflicts given the current situation: no    Objective:     Communicated with: Nurse prior to session.  Patient found HOB elevated with telemetry, peripheral IV, bed alarm upon OT entry to room.    General Precautions: Standard, fall  Orthopedic Precautions: LLE weight bearing as tolerated  Braces: N/A  Respiratory Status: Room air    Occupational Performance:    Bed Mobility:    Patient completed Scooting/Bridging with maximal assistance and 2 persons  Patient completed Supine to Sit with maximal assistance and 2 persons  Patient completed Sit to Supine with maximal assistance and 2 persons    Functional Mobility/Transfers:  Patient completed Sit <> Stand Transfer x 1 trial from EOB with maximal assistance and of 2 persons  with  rolling walker; pt required v/t cueing for safe hand placement on RW; pt required cueing for bearing weight through BUEs to offload weight to LLE; pt w/ increasing pain, eventually requiring HHA x 2   Functional Mobility: Pt was able to laterally step to HOB w/ max A x 2 persons; pt required v/t cueing for proper/safe body mechanics..     Activities of Daily Living:  Upper Body Dressing: minimum assistance for donning gown over back     Cognitive/Visual Perceptual:  Cognitive/Psychosocial Skills:     -       Oriented to: Person, Place, Time, and Situation   -       Follows Commands/attention:Follows multistep  commands  -       Communication: clear/fluent  -       Memory: No Deficits noted  -       Safety awareness/insight to disability: intact     Physical Exam:  Balance:    -       good sitting balance; fair + standing   Postural examination/scapula alignment:    -       No postural abnormalities identified  Skin integrity: Visible skin intact and dressing to L hip intact   Edema:  Mild LLE   Sensation:    -        Intact  Upper Extremity Range of Motion:     -       Right Upper Extremity: WFL  -       Left Upper Extremity: WFL  Upper Extremity Strength:    -       Right Upper Extremity: WFL  -       Left Upper Extremity: WFL   Strength:    -       Right Upper Extremity: WFL  -       Left Upper Extremity: WFL    AMPAC 6 Click ADL:  AMPAC Total Score: 15    Treatment & Education:  -Initial eval complete.   -Pt educated on role of OT and POC.   -Questions and concerns addressed within scope.     Patient left HOB elevated with all lines intact and call button in reach    GOALS:   Multidisciplinary Problems       Occupational Therapy Goals          Problem: Occupational Therapy    Goal Priority Disciplines Outcome Interventions   Occupational Therapy Goal     OT, PT/OT Progressing    Description: Goals to be met by: 9/30/24     Patient will increase functional independence with ADLs by performing:    LE Dressing with Minimal Assistance.  Grooming while seated with Modified [place].  Toileting from toilet with Minimal Assistance for hygiene and clothing management.   Step transfer with Minimal Assistance  Toilet transfer to bedside commode with Minimal Assistance.  Upper extremity exercise program x15 reps per handout, with independence.                         History:     Past Medical History:   Diagnosis Date    Diabetes mellitus     Hypertension          Past Surgical History:   Procedure Laterality Date    HYSTERECTOMY      INTRAMEDULLARY RODDING OF FEMUR Left 9/15/2024    Procedure: INSERTION, INTRAMEDULLARY GONZALEZ, FEMUR;  Surgeon: Jose Alberto Caal MD;  Location: [facility];  Service: Orthopedics;  Laterality: Left;    OOPHORECTOMY         Time Tracking:     OT Date of Treatment: 09/16/24  OT Start Time: 1615  OT Stop Time: 1640  OT Total Time (min): 25 min    Billable Minutes:Evaluation 15  Therapeutic Activity 10  Total Time 25 (co-eval w/ PT)     9/16/2024

## 2024-09-16 NOTE — ASSESSMENT & PLAN NOTE
-resume home pravastatin     How Severe Is Your Skin Lesion?: mild Has Your Skin Lesion Been Treated?: not been treated Is This A New Presentation, Or A Follow-Up?: Skin Lesions Is This A New Presentation, Or A Follow-Up?: Skin Lesion Which Family Member (Optional)?: Chad Suazo

## 2024-09-16 NOTE — PT/OT/SLP PROGRESS
Occupational Therapy      Patient Name:  Marya Carranza   MRN:  [id number]    Patient not seen today secondary to pt w/ low H&H 6.6 and 20.9'; pt w/ orders for blood transfusion  . Will follow-up as able.    9/16/2024

## 2024-09-16 NOTE — NURSING
Bedside report given to night nurse BITA Boyer. Walking rounds completed. Visualized and assessed patient. NAD noted. Safety precautions maintained and call light within reach.    Chart check completed.

## 2024-09-16 NOTE — ANESTHESIA POSTPROCEDURE EVALUATION
Anesthesia Post Evaluation    Patient: Marya Carranza    Procedure(s) Performed: Procedure(s) (LRB):  INSERTION, INTRAMEDULLARY GONZALEZ, FEMUR (Left)    Final Anesthesia Type: spinal      Patient location during evaluation: floor  Patient participation: Yes- Able to Participate  Level of consciousness: awake and alert  Post-procedure vital signs: reviewed and stable  Pain management: adequate  Airway patency: patent    PONV status at discharge: No PONV  Anesthetic complications: no      Cardiovascular status: hemodynamically stable and blood pressure returned to baseline  Respiratory status: room air, spontaneous ventilation and unassisted  Hydration status: euvolemic  Follow-up not needed.              Vitals Value Taken Time   /56 09/16/24 0658   Temp 36.9 °C (98.4 °F) 09/16/24 0658   Pulse 108 09/16/24 0658   Resp 16 09/16/24 0658   SpO2 93 % 09/16/24 0658         Event Time   Out of Recovery 09/15/2024 16:25:00         Pain/Crissy Score: Pain Rating Prior to Med Admin: 6 (9/15/2024  9:23 PM)  Pain Rating Post Med Admin: 3 (9/15/2024 10:23 PM)  Crissy Score: 10 (9/15/2024  3:39 PM)

## 2024-09-16 NOTE — NURSING
[facility]  Nurses Note -- 4 Eyes      9/16/2024       Skin assessed on: Q Shift      [] No Pressure Injuries Present    []Prevention Measures Documented    [] Yes LDA  for Pressure Injury Previously documented     [] Yes New Pressure Injury Discovered   [] LDA for New Pressure Injury Added      Attending RN:  Sheila Cam RN     Second RN:  Merlin

## 2024-09-16 NOTE — PLAN OF CARE
Problem: Adult Inpatient Plan of Care  Goal: Optimal Comfort and Wellbeing  Intervention: Monitor Pain and Promote Comfort  Flowsheets (Taken 9/16/2024 0301)  Pain Management Interventions:   care clustered   medication offered   pillow support provided   position adjusted   quiet environment facilitated   warm blanket provided     Problem: Fall Injury Risk  Goal: Absence of Fall and Fall-Related Injury  Intervention: Promote Injury-Free Environment  Flowsheets (Taken 9/16/2024 0301)  Safety Promotion/Fall Prevention:   assistive device/personal item within reach   bed alarm set   medications reviewed   room near unit station   side rails raised x 2      C/o heartburn. MD notified, PRN pepcid given, HOB elevated.

## 2024-09-16 NOTE — NURSING
Patient c/o heartburn and indigestion. MD notified, Dr. Eckert made order and carried out, PRN famotidine 20 mg tab given.

## 2024-09-16 NOTE — SUBJECTIVE & OBJECTIVE
Interval History:  No acute overnight events.  Patient remained afebrile.  Complains of left hip pain, which is controlled with PO pain medications but states it does not last long.  Pt s/p left hip intramedullary nailing on 09/15. Denies bleeding. Admits fatigue    Review of Systems   Constitutional:  Positive for activity change and fatigue. Negative for chills and fever.   Respiratory:  Negative for cough, chest tightness and shortness of breath.    Cardiovascular:  Negative for chest pain.   Musculoskeletal:  Positive for arthralgias and gait problem. Negative for joint swelling.   Neurological:  Positive for weakness. Negative for dizziness, syncope and headaches.   Psychiatric/Behavioral:  Negative for confusion and hallucinations.      Objective:     Vital Signs (Most Recent):  Temp: 98.2 °F (36.8 °C) (09/16/24 1044)  Pulse: 104 (09/16/24 1134)  Resp: 16 (09/16/24 1044)  BP: 122/71 (09/16/24 1044)  SpO2: (!) 91 % (09/16/24 1044) Vital Signs (24h Range):  Temp:  [97.5 °F (36.4 °C)-98.9 °F (37.2 °C)] 98.2 °F (36.8 °C)  Pulse:  [] 104  Resp:  [16-33] 16  SpO2:  [91 %-100 %] 91 %  BP: ()/(35-71) 122/71     Weight: 51 kg (112 lb 7 oz)  Body mass index is 19.92 kg/m².    Intake/Output Summary (Last 24 hours) at 9/16/2024 1214  Last data filed at 9/16/2024 0830  Gross per 24 hour   Intake 120 ml   Output 100 ml   Net 20 ml         Physical Exam  Vitals and nursing note reviewed.   Constitutional:       General: She is not in acute distress.     Appearance: She is not ill-appearing.   HENT:      Nose: Nose normal.      Mouth/Throat:      Mouth: Mucous membranes are moist.   Eyes:      Extraocular Movements: Extraocular movements intact.   Cardiovascular:      Rate and Rhythm: Normal rate and regular rhythm.      Heart sounds: No murmur heard.  Pulmonary:      Effort: Pulmonary effort is normal. No respiratory distress.      Breath sounds: Normal breath sounds.   Abdominal:      General: There is no  distension.      Palpations: Abdomen is soft.   Musculoskeletal:         General: Tenderness (left hip pain) present. No swelling.      Right lower leg: No edema.      Left lower leg: No edema.      Comments: ROM limited due to pain. She appears to be neurovascular intact distally to left lower extremity.   Skin:     General: Skin is warm and dry.      Coloration: Skin is pale.      Comments: Incision/ dressing clean/dry/intact   Neurological:      General: No focal deficit present.      Mental Status: She is alert and oriented to person, place, and time.   Psychiatric:         Mood and Affect: Mood normal.         Thought Content: Thought content normal.             Significant Labs: All pertinent labs within the past 24 hours have been reviewed.    Significant Imaging: I have reviewed all pertinent imaging results/findings within the past 24 hours.

## 2024-09-16 NOTE — ASSESSMENT & PLAN NOTE
Anemia is likely due to acute blood loss which was from surgery (s/p left hip intramedullary nailing on 09/15)  and Iron deficiency. Most recent hemoglobin and hematocrit are listed below.  Recent Labs     09/15/24  0446 09/15/24  1401 09/16/24  0611   HGB 9.9* 8.6* 6.6*   HCT 30.0* 26.1* 20.9*       Plan  - Monitor serial CBC: Daily  - Transfuse PRBC if patient becomes hemodynamically unstable, symptomatic or H/H drops below 7/21.  - Patient has received 1 units of PRBCs on 09/16/2024  - Patient's anemia is currently stable  - monitor.

## 2024-09-16 NOTE — ASSESSMENT & PLAN NOTE
Patient's FSGs are controlled on current medication regimen.  Last A1c reviewed-   Lab Results   Component Value Date    HGBA1C 7.4 (H) 09/14/2024     Most recent fingerstick glucose reviewed-   Recent Labs   Lab 09/15/24  1719 09/16/24  0022 09/16/24  0510 09/16/24  1044   POCTGLUCOSE 239* 227* 199* 253*       Current correctional scale  Low  Maintain anti-hyperglycemic dose as follows-   Antihyperglycemics (From admission, onward)      Start     Stop Route Frequency Ordered    09/14/24 1608  insulin aspart U-100 pen 0-5 Units         -- SubQ Before meals & nightly PRN 09/14/24 1509          Hold Oral hypoglycemics while patient is in the hospital.  Repeat A1C- 7.4  Meds: SSI PRN to maintain goal 140-180  ADA diet when appropriate, accuchecks ACHS, hypoglycemic protocol

## 2024-09-16 NOTE — PLAN OF CARE
Problem: Occupational Therapy  Goal: Occupational Therapy Goal  Description: Goals to be met by: 9/30/24     Patient will increase functional independence with ADLs by performing:    LE Dressing with Minimal Assistance.  Grooming while seated with Modified [place].  Toileting from toilet with Minimal Assistance for hygiene and clothing management.   Step transfer with Minimal Assistance  Toilet transfer to bedside commode with Minimal Assistance.  Upper extremity exercise program x15 reps per handout, with independence.    Outcome: Progressing

## 2024-09-16 NOTE — ASSESSMENT & PLAN NOTE
-presented with left hip pain after a mechanical fall (she tripped over her puppy).   -Left hip x-ray and CT left hip with roximal left femoral fracture.    -CT head with no acute intracranial abnormalities.    -Chest x-ray with no large focal consolidation.    -Orthopedic surgery was consulted:s/p  intramedullary nailing of the left hip on 09/15/2024.    -PT/OT consulted.

## 2024-09-16 NOTE — ASSESSMENT & PLAN NOTE
Chronic, uncontrolled.   -resume home amlodipine   -BP uncontrolled, titrate home amlodipine. Increased to 10mg on 09/15  -IV hydralazine PRN for SBP>180    Latest blood pressure and vitals reviewed-     Temp:  [97.5 °F (36.4 °C)-98.9 °F (37.2 °C)]   Pulse:  []   Resp:  [16-33]   BP: ()/(35-71)   SpO2:  [91 %-100 %] .   Home meds for hypertension were reviewed and noted below.   Hypertension Medications               amLODIPine (NORVASC) 5 MG tablet Take 1 tablet (5 mg total) by mouth once daily.            While in the hospital, will manage blood pressure as follows; Continue home antihypertensive regimen    Will utilize p.r.n. blood pressure medication only if patient's blood pressure greater than 180/110 and she develops symptoms such as worsening chest pain or shortness of breath.

## 2024-09-16 NOTE — ASSESSMENT & PLAN NOTE
Patient has Abnormal Magnesium: hypomagnesemia. Will continue to monitor electrolytes closely. Will replace the affected electrolytes and repeat labs to be done after interventions completed. The patient's magnesium results have been reviewed and are listed below.  Recent Labs   Lab 09/16/24  0611   MG 1.7

## 2024-09-16 NOTE — NURSING
[facility]  Nurses Note -- 4 Eyes      9/16/2024       Skin assessed on: Q Shift      [x] No Pressure Injuries Present    []Prevention Measures Documented    [] Yes LDA  for Pressure Injury Previously documented     [] Yes New Pressure Injury Discovered   [] LDA for New Pressure Injury Added      Attending RN:  Merlin Dejesus RN     Second RN:  HARSHAD Almendarez

## 2024-09-16 NOTE — PROGRESS NOTES
"[facility] Medicine  Progress Note    Patient Name: Marya Carranza  MRN: [id number]  Patient Class: IP- Inpatient   Admission Date: 9/14/2024  Length of Stay: 2 days  Attending Physician: Dick Ham DO  Primary Care Provider: Phillip Levy MD        Subjective:     Principal Problem:Closed fracture of left hip        HPI:   84 y.o. female, with a PMHx of DM, HTN, HLD presents ED via EMS with complaints of left hip pain after sustaining a fall today.  Stated that she tripped over her dog and fell forward landing on her left hip.  Denies any head injury, loss of consciousness, neck pain, numbness or tingling, or any new weaknesses.  Required help from her family to get back up, but unable to bear weight on her left hip due to the pain.  Denies any anticoagulation.Sister at bedside. Of note, she fell a few weeks ago because she was "dehydrated" and required staples in her head.     In the ED, patient was hypertensive with systolic blood pressure in the 190s.  No hypoxia.  Labs with normocytic anemia, appears to be near baseline.  Left hip x-ray and CT left hip with roximal left femoral fracture.  CT head with no acute intracranial abnormalities.  Chest x-ray with no large focal consolidation.  Orthopedic surgery was consulted in the ED (Dr. Greene spoke with Dr. Caal).  Admitted to hospital medicine for further evaluation and management    Overview/Hospital Course:  84 y.o. female admitted on 09/14/24 for acute proximal left femoral fracture.  Orthopedic surgery consulted-s/p intramedullary nailing of the left hip on 09/15/2024.  PT/OT consulted postoperatively.  Also found to have hypomagnesemia, electrolytes replaced as needed. Hgb 6.6 on 09/16, transfused 1U pRBC oon 09/16.  Continue symptomatic management and supportive care.  Pain control    Interval History:  No acute overnight events.  Patient remained afebrile.  Complains of left hip pain, which is controlled with PO pain " medications but states it does not last long.  Pt s/p left hip intramedullary nailing on 09/15. Denies bleeding. Admits fatigue    Review of Systems   Constitutional:  Positive for activity change and fatigue. Negative for chills and fever.   Respiratory:  Negative for cough, chest tightness and shortness of breath.    Cardiovascular:  Negative for chest pain.   Musculoskeletal:  Positive for arthralgias and gait problem. Negative for joint swelling.   Neurological:  Positive for weakness. Negative for dizziness, syncope and headaches.   Psychiatric/Behavioral:  Negative for confusion and hallucinations.      Objective:     Vital Signs (Most Recent):  Temp: 98.2 °F (36.8 °C) (09/16/24 1044)  Pulse: 104 (09/16/24 1134)  Resp: 16 (09/16/24 1044)  BP: 122/71 (09/16/24 1044)  SpO2: (!) 91 % (09/16/24 1044) Vital Signs (24h Range):  Temp:  [97.5 °F (36.4 °C)-98.9 °F (37.2 °C)] 98.2 °F (36.8 °C)  Pulse:  [] 104  Resp:  [16-33] 16  SpO2:  [91 %-100 %] 91 %  BP: ()/(35-71) 122/71     Weight: 51 kg (112 lb 7 oz)  Body mass index is 19.92 kg/m².    Intake/Output Summary (Last 24 hours) at 9/16/2024 1214  Last data filed at 9/16/2024 0830  Gross per 24 hour   Intake 120 ml   Output 100 ml   Net 20 ml         Physical Exam  Vitals and nursing note reviewed.   Constitutional:       General: She is not in acute distress.     Appearance: She is not ill-appearing.   HENT:      Nose: Nose normal.      Mouth/Throat:      Mouth: Mucous membranes are moist.   Eyes:      Extraocular Movements: Extraocular movements intact.   Cardiovascular:      Rate and Rhythm: Normal rate and regular rhythm.      Heart sounds: No murmur heard.  Pulmonary:      Effort: Pulmonary effort is normal. No respiratory distress.      Breath sounds: Normal breath sounds.   Abdominal:      General: There is no distension.      Palpations: Abdomen is soft.   Musculoskeletal:         General: Tenderness (left hip pain) present. No swelling.      Right  lower leg: No edema.      Left lower leg: No edema.      Comments: ROM limited due to pain. She appears to be neurovascular intact distally to left lower extremity.   Skin:     General: Skin is warm and dry.      Coloration: Skin is pale.      Comments: Incision/ dressing clean/dry/intact   Neurological:      General: No focal deficit present.      Mental Status: She is alert and oriented to person, place, and time.   Psychiatric:         Mood and Affect: Mood normal.         Thought Content: Thought content normal.             Significant Labs: All pertinent labs within the past 24 hours have been reviewed.    Significant Imaging: I have reviewed all pertinent imaging results/findings within the past 24 hours.    Assessment/Plan:      * Closed fracture of left hip  -presented with left hip pain after a mechanical fall (she tripped over her puppy).   -Left hip x-ray and CT left hip with roximal left femoral fracture.    -CT head with no acute intracranial abnormalities.    -Chest x-ray with no large focal consolidation.    -Orthopedic surgery was consulted:s/p  intramedullary nailing of the left hip on 09/15/2024.    -PT/OT consulted.    Hypertension  Chronic, uncontrolled.   -resume home amlodipine   -BP uncontrolled, titrate home amlodipine. Increased to 10mg on 09/15  -IV hydralazine PRN for SBP>180    Latest blood pressure and vitals reviewed-     Temp:  [97.5 °F (36.4 °C)-98.9 °F (37.2 °C)]   Pulse:  []   Resp:  [16-33]   BP: ()/(35-71)   SpO2:  [91 %-100 %] .   Home meds for hypertension were reviewed and noted below.   Hypertension Medications               amLODIPine (NORVASC) 5 MG tablet Take 1 tablet (5 mg total) by mouth once daily.            While in the hospital, will manage blood pressure as follows; Continue home antihypertensive regimen    Will utilize p.r.n. blood pressure medication only if patient's blood pressure greater than 180/110 and she develops symptoms such as worsening chest  pain or shortness of breath.    Type 2 diabetes mellitus  Patient's FSGs are controlled on current medication regimen.  Last A1c reviewed-   Lab Results   Component Value Date    HGBA1C 7.4 (H) 09/14/2024     Most recent fingerstick glucose reviewed-   Recent Labs   Lab 09/15/24  1719 09/16/24  0022 09/16/24  0510 09/16/24  1044   POCTGLUCOSE 239* 227* 199* 253*       Current correctional scale  Low  Maintain anti-hyperglycemic dose as follows-   Antihyperglycemics (From admission, onward)      Start     Stop Route Frequency Ordered    09/14/24 1608  insulin aspart U-100 pen 0-5 Units         -- SubQ Before meals & nightly PRN 09/14/24 1509          Hold Oral hypoglycemics while patient is in the hospital.  Repeat A1C- 7.4  Meds: SSI PRN to maintain goal 140-180  ADA diet when appropriate, accuchecks ACHS, hypoglycemic protocol      HLD (hyperlipidemia)  -resume home pravastatin      Anemia  Anemia is likely due to acute blood loss which was from surgery (s/p left hip intramedullary nailing on 09/15)  and Iron deficiency. Most recent hemoglobin and hematocrit are listed below.  Recent Labs     09/15/24  0446 09/15/24  1401 09/16/24  0611   HGB 9.9* 8.6* 6.6*   HCT 30.0* 26.1* 20.9*       Plan  - Monitor serial CBC: Daily  - Transfuse PRBC if patient becomes hemodynamically unstable, symptomatic or H/H drops below 7/21.  - Patient has received 1 units of PRBCs on 09/16/2024  - Patient's anemia is currently stable  - monitor.     Hypomagnesemia  Patient has Abnormal Magnesium: hypomagnesemia. Will continue to monitor electrolytes closely. Will replace the affected electrolytes and repeat labs to be done after interventions completed. The patient's magnesium results have been reviewed and are listed below.  Recent Labs   Lab 09/16/24  0611   MG 1.7          Advanced care planning/counseling discussion  Advance Care Planning    Date: 09/14/2024    Code Status  I engaged the the patient in a voluntary conversation about  the patient's preferences for care  at the very end of life. The patient wishes to have CPR or other invasive treatments performed when her heart and/or breathing stops. I communicated to the patient that her wishes align with full code status and she agrees. Daughter at bedside and present for discussion.     A total of 16 min was spent on advance care planning, goals of care discussion, emotional support, formulating and communicating prognosis and exploring burden/benefit of various approaches of treatment. This discussion occurred on a fully voluntary basis with the verbal consent of the patient and/or family.             VTE Risk Mitigation (From admission, onward)           Ordered     enoxaparin injection 40 mg  Daily         09/14/24 1509     IP VTE HIGH RISK PATIENT  Once         09/14/24 1509     Place sequential compression device  Until discontinued         09/14/24 1509                    Discharge Planning   OSCAR:      Code Status: Full Code   Is the patient medically ready for discharge?:     Reason for patient still in hospital (select all that apply): Patient trending condition, Treatment, Consult recommendations, and PT / OT recommendations  Discharge Plan A: Home with family                  Dick Ham DO  Department of Hospital Medicine   [facility]

## 2024-09-17 PROBLEM — I50.33 ACUTE ON CHRONIC DIASTOLIC HEART FAILURE: Status: ACTIVE | Noted: 2024-09-17

## 2024-09-17 LAB
ANION GAP SERPL CALC-SCNC: 11 MMOL/L (ref 8–16)
ANION GAP SERPL CALC-SCNC: 13 MMOL/L (ref 8–16)
BASOPHILS # BLD AUTO: 0.02 K/UL (ref 0–0.2)
BASOPHILS NFR BLD: 0.2 % (ref 0–1.9)
BUN SERPL-MCNC: 12 MG/DL (ref 8–23)
BUN SERPL-MCNC: 13 MG/DL (ref 8–23)
CALCIUM SERPL-MCNC: 8.5 MG/DL (ref 8.7–10.5)
CALCIUM SERPL-MCNC: 8.7 MG/DL (ref 8.7–10.5)
CHLORIDE SERPL-SCNC: 94 MMOL/L (ref 95–110)
CHLORIDE SERPL-SCNC: 94 MMOL/L (ref 95–110)
CO2 SERPL-SCNC: 27 MMOL/L (ref 23–29)
CO2 SERPL-SCNC: 27 MMOL/L (ref 23–29)
CREAT SERPL-MCNC: 0.7 MG/DL (ref 0.5–1.4)
CREAT SERPL-MCNC: 0.8 MG/DL (ref 0.5–1.4)
DIFFERENTIAL METHOD BLD: ABNORMAL
EOSINOPHIL # BLD AUTO: 0 K/UL (ref 0–0.5)
EOSINOPHIL NFR BLD: 0.1 % (ref 0–8)
ERYTHROCYTE [DISTWIDTH] IN BLOOD BY AUTOMATED COUNT: 14.3 % (ref 11.5–14.5)
EST. GFR  (NO RACE VARIABLE): >60 ML/MIN/1.73 M^2
EST. GFR  (NO RACE VARIABLE): >60 ML/MIN/1.73 M^2
GLUCOSE SERPL-MCNC: 214 MG/DL (ref 70–110)
GLUCOSE SERPL-MCNC: 220 MG/DL (ref 70–110)
HCT VFR BLD AUTO: 25.5 % (ref 37–48.5)
HCT VFR BLD AUTO: 26 % (ref 37–48.5)
HGB BLD-MCNC: 8.3 G/DL (ref 12–16)
HGB BLD-MCNC: 8.4 G/DL (ref 12–16)
IMM GRANULOCYTES # BLD AUTO: 0.06 K/UL (ref 0–0.04)
IMM GRANULOCYTES NFR BLD AUTO: 0.6 % (ref 0–0.5)
LYMPHOCYTES # BLD AUTO: 1.1 K/UL (ref 1–4.8)
LYMPHOCYTES NFR BLD: 10.4 % (ref 18–48)
MAGNESIUM SERPL-MCNC: 1.5 MG/DL (ref 1.6–2.6)
MCH RBC QN AUTO: 28.7 PG (ref 27–31)
MCHC RBC AUTO-ENTMCNC: 32.3 G/DL (ref 32–36)
MCV RBC AUTO: 89 FL (ref 82–98)
MONOCYTES # BLD AUTO: 1.4 K/UL (ref 0.3–1)
MONOCYTES NFR BLD: 13.3 % (ref 4–15)
NEUTROPHILS # BLD AUTO: 8 K/UL (ref 1.8–7.7)
NEUTROPHILS NFR BLD: 75.4 % (ref 38–73)
NRBC BLD-RTO: 0 /100 WBC
PHOSPHATE SERPL-MCNC: 2.4 MG/DL (ref 2.7–4.5)
PLATELET # BLD AUTO: 219 K/UL (ref 150–450)
PMV BLD AUTO: 10.8 FL (ref 9.2–12.9)
POCT GLUCOSE: 217 MG/DL (ref 70–110)
POCT GLUCOSE: 297 MG/DL (ref 70–110)
POCT GLUCOSE: 309 MG/DL (ref 70–110)
POTASSIUM SERPL-SCNC: 3.7 MMOL/L (ref 3.5–5.1)
POTASSIUM SERPL-SCNC: 3.8 MMOL/L (ref 3.5–5.1)
RBC # BLD AUTO: 2.93 M/UL (ref 4–5.4)
SODIUM SERPL-SCNC: 132 MMOL/L (ref 136–145)
SODIUM SERPL-SCNC: 134 MMOL/L (ref 136–145)
WBC # BLD AUTO: 10.53 K/UL (ref 3.9–12.7)

## 2024-09-17 PROCEDURE — 63600175 PHARM REV CODE 636 W HCPCS: Performed by: ORTHOPAEDIC SURGERY

## 2024-09-17 PROCEDURE — 97530 THERAPEUTIC ACTIVITIES: CPT

## 2024-09-17 PROCEDURE — 85025 COMPLETE CBC W/AUTO DIFF WBC: CPT | Performed by: ORTHOPAEDIC SURGERY

## 2024-09-17 PROCEDURE — 84100 ASSAY OF PHOSPHORUS: CPT | Performed by: ORTHOPAEDIC SURGERY

## 2024-09-17 PROCEDURE — 21400001 HC TELEMETRY ROOM

## 2024-09-17 PROCEDURE — 25000003 PHARM REV CODE 250: Performed by: ORTHOPAEDIC SURGERY

## 2024-09-17 PROCEDURE — 80048 BASIC METABOLIC PNL TOTAL CA: CPT | Mod: 91 | Performed by: ORTHOPAEDIC SURGERY

## 2024-09-17 PROCEDURE — 27000221 HC OXYGEN, UP TO 24 HOURS

## 2024-09-17 PROCEDURE — 63600175 PHARM REV CODE 636 W HCPCS: Performed by: STUDENT IN AN ORGANIZED HEALTH CARE EDUCATION/TRAINING PROGRAM

## 2024-09-17 PROCEDURE — 83735 ASSAY OF MAGNESIUM: CPT | Performed by: ORTHOPAEDIC SURGERY

## 2024-09-17 PROCEDURE — 99900035 HC TECH TIME PER 15 MIN (STAT)

## 2024-09-17 PROCEDURE — 94761 N-INVAS EAR/PLS OXIMETRY MLT: CPT

## 2024-09-17 PROCEDURE — 36415 COLL VENOUS BLD VENIPUNCTURE: CPT | Performed by: ORTHOPAEDIC SURGERY

## 2024-09-17 PROCEDURE — 25000003 PHARM REV CODE 250: Performed by: STUDENT IN AN ORGANIZED HEALTH CARE EDUCATION/TRAINING PROGRAM

## 2024-09-17 PROCEDURE — 85014 HEMATOCRIT: CPT | Performed by: ORTHOPAEDIC SURGERY

## 2024-09-17 PROCEDURE — 94799 UNLISTED PULMONARY SVC/PX: CPT

## 2024-09-17 PROCEDURE — 85018 HEMOGLOBIN: CPT | Performed by: ORTHOPAEDIC SURGERY

## 2024-09-17 PROCEDURE — 97110 THERAPEUTIC EXERCISES: CPT

## 2024-09-17 RX ORDER — IPRATROPIUM BROMIDE AND ALBUTEROL SULFATE 2.5; .5 MG/3ML; MG/3ML
3 SOLUTION RESPIRATORY (INHALATION) EVERY 4 HOURS PRN
Status: DISCONTINUED | OUTPATIENT
Start: 2024-09-17 | End: 2024-09-20 | Stop reason: HOSPADM

## 2024-09-17 RX ORDER — SODIUM CHLORIDE 0.9 % (FLUSH) 0.9 %
10 SYRINGE (ML) INJECTION
Status: DISCONTINUED | OUTPATIENT
Start: 2024-09-17 | End: 2024-09-20 | Stop reason: HOSPADM

## 2024-09-17 RX ORDER — MAGNESIUM SULFATE HEPTAHYDRATE 40 MG/ML
2 INJECTION, SOLUTION INTRAVENOUS ONCE
Status: COMPLETED | OUTPATIENT
Start: 2024-09-17 | End: 2024-09-17

## 2024-09-17 RX ORDER — SODIUM,POTASSIUM PHOSPHATES 280-250MG
2 POWDER IN PACKET (EA) ORAL
Status: COMPLETED | OUTPATIENT
Start: 2024-09-17 | End: 2024-09-17

## 2024-09-17 RX ADMIN — DOCUSATE SODIUM 100 MG: 100 CAPSULE, LIQUID FILLED ORAL at 08:09

## 2024-09-17 RX ADMIN — PANTOPRAZOLE SODIUM 40 MG: 40 TABLET, DELAYED RELEASE ORAL at 08:09

## 2024-09-17 RX ADMIN — MAGNESIUM SULFATE HEPTAHYDRATE 2 G: 40 INJECTION, SOLUTION INTRAVENOUS at 08:09

## 2024-09-17 RX ADMIN — HYDROCODONE BITARTRATE AND ACETAMINOPHEN 1 TABLET: 7.5; 325 TABLET ORAL at 06:09

## 2024-09-17 RX ADMIN — AMLODIPINE BESYLATE 10 MG: 5 TABLET ORAL at 08:09

## 2024-09-17 RX ADMIN — ENOXAPARIN SODIUM 40 MG: 40 INJECTION SUBCUTANEOUS at 05:09

## 2024-09-17 RX ADMIN — MUPIROCIN: 20 OINTMENT TOPICAL at 08:09

## 2024-09-17 RX ADMIN — Medication 2 PACKET: at 08:09

## 2024-09-17 RX ADMIN — HYDROCODONE BITARTRATE AND ACETAMINOPHEN 1 TABLET: 7.5; 325 TABLET ORAL at 09:09

## 2024-09-17 RX ADMIN — INSULIN ASPART 4 UNITS: 100 INJECTION, SOLUTION INTRAVENOUS; SUBCUTANEOUS at 05:09

## 2024-09-17 RX ADMIN — Medication 2 PACKET: at 05:09

## 2024-09-17 RX ADMIN — Medication 2 PACKET: at 01:09

## 2024-09-17 RX ADMIN — PRAVASTATIN SODIUM 40 MG: 40 TABLET ORAL at 08:09

## 2024-09-17 NOTE — PROGRESS NOTES
HOSPITALIST ON CALL NOTE:    Contacted by the RN regarding sats in the 80s on RA.  Went to bedside and noted patient arousalble and in no distress. NC in place at 4L. ABG on 3L 7.44/37/49/25.4.  BNP 1,062. Repeat H/H 8.6/25.3.  Reviewed I/O and positive 1, 347.     Echo 6/2024:     Interpretation Summary    Left Ventricle: The left ventricle is normal in size. Normal wall thickness. There is normal systolic function with a visually estimated ejection fraction of 55 - 60%. Grade I diastolic dysfunction.    Right Ventricle: Normal right ventricular cavity size. Systolic function is normal.    Left Atrium: Left atrium is moderately dilated.    Right Atrium: Right atrium is mildly dilated.    Aortic Valve: There is mild aortic regurgitation.    Mitral Valve: There is mild regurgitation.    Tricuspid Valve: There is mild regurgitation.    Pulmonary Artery: The estimated pulmonary artery systolic pressure is 8 mmHg.    IVC/SVC: Normal venous pressure at 3 mmHg.    Also noted she did received a unit of PRBC s/p hip surgery.     Went to assess patient at bedside:    Vitals:    09/16/24 2142 09/16/24 2200 09/16/24 2218 09/16/24 2230   BP:   136/63    BP Location:   Right arm    Patient Position:   Lying    Pulse:  105 106    Resp: 16      Temp:       TempSrc:       SpO2:  (!) 93%  (!) 93%   Weight:       Height:              Physical Exam  Constitutional:       General: She is not in acute distress.     Appearance: She is not ill-appearing, toxic-appearing or diaphoretic.   HENT:      Head: Normocephalic and atraumatic.      Mouth/Throat:      Mouth: Mucous membranes are dry.      Pharynx: Oropharynx is clear. No oropharyngeal exudate or posterior oropharyngeal erythema.   Eyes:      General: No scleral icterus.     Extraocular Movements: Extraocular movements intact.      Pupils: Pupils are equal, round, and reactive to light.   Neck:      Vascular: No carotid bruit.   Cardiovascular:      Rate and Rhythm: Normal rate and  regular rhythm.      Pulses: Normal pulses.      Heart sounds: No murmur heard.     No friction rub. No gallop.   Pulmonary:      Effort: Pulmonary effort is normal. No respiratory distress.      Breath sounds: Rales (bilateral lower bases.) present. No wheezing or rhonchi.   Abdominal:      General: Bowel sounds are normal. There is no distension.      Palpations: Abdomen is soft.      Tenderness: There is no abdominal tenderness. There is no guarding or rebound.   Musculoskeletal:         General: No swelling or deformity. Normal range of motion.      Cervical back: Normal range of motion and neck supple.      Right lower leg: No edema.      Left lower leg: No edema.   Skin:     General: Skin is warm.      Capillary Refill: Capillary refill takes less than 2 seconds.      Coloration: Skin is pale.   Neurological:      General: No focal deficit present.      Mental Status: She is alert and oriented to person, place, and time.      Cranial Nerves: No cranial nerve deficit.   Psychiatric:         Mood and Affect: Mood normal.         Behavior: Behavior normal.           Will give 20mg IV lasix (got 20mg IV a few minutes ago) given BNP and CXR with increased vascular congestion on my exam as well as fluid in the RML fissure.  Wean oxygen as able. F/u on lytes in am. ON cont. Pulse ox now and cont. Tele.

## 2024-09-17 NOTE — ASSESSMENT & PLAN NOTE
Chronic, controlled.   -resume home amlodipine   -BP uncontrolled, titrate home amlodipine. Increased to 10mg on 09/15  -blood pressure better controlled  -IV hydralazine PRN for SBP>180    Latest blood pressure and vitals reviewed-     Temp:  [98 °F (36.7 °C)-98.9 °F (37.2 °C)]   Pulse:  []   Resp:  [16-18]   BP: (111-137)/(56-73)   SpO2:  [90 %-100 %] .   Home meds for hypertension were reviewed and noted below.   Hypertension Medications               amLODIPine (NORVASC) 5 MG tablet Take 1 tablet (5 mg total) by mouth once daily.            While in the hospital, will manage blood pressure as follows; Continue home antihypertensive regimen    Will utilize p.r.n. blood pressure medication only if patient's blood pressure greater than 180/110 and she develops symptoms such as worsening chest pain or shortness of breath.

## 2024-09-17 NOTE — PLAN OF CARE
Problem: Adult Inpatient Plan of Care  Goal: Plan of Care Review  Outcome: Met  Goal: Patient-Specific Goal (Individualized)  Outcome: Met  Goal: Absence of Hospital-Acquired Illness or Injury  Outcome: Met  Goal: Optimal Comfort and Wellbeing  Outcome: Met  Goal: Readiness for Transition of Care  Outcome: Met     Problem: Diabetes Comorbidity  Goal: Blood Glucose Level Within Targeted Range  Outcome: Met     Problem: Fall Injury Risk  Goal: Absence of Fall and Fall-Related Injury  Outcome: Met     Problem: Skin Injury Risk Increased  Goal: Skin Health and Integrity  Outcome: Met     Problem: Wound  Goal: Optimal Coping  Outcome: Met  Goal: Optimal Functional Ability  Outcome: Met  Goal: Absence of Infection Signs and Symptoms  Outcome: Met  Goal: Improved Oral Intake  Outcome: Met  Goal: Optimal Pain Control and Function  Outcome: Met  Goal: Skin Health and Integrity  Outcome: Met  Goal: Optimal Wound Healing  Outcome: Met     Problem: Infection  Goal: Absence of Infection Signs and Symptoms  Outcome: Met

## 2024-09-17 NOTE — PROGRESS NOTES
"[facility] Medicine  Progress Note    Patient Name: Marya Carranza  MRN: [id number]  Patient Class: IP- Inpatient   Admission Date: 9/14/2024  Length of Stay: 3 days  Attending Physician: Dick Ham DO  Primary Care Provider: Phillip Levy MD        Subjective:     Principal Problem:Closed fracture of left hip        HPI:   84 y.o. female, with a PMHx of DM, HTN, HLD presents ED via EMS with complaints of left hip pain after sustaining a fall today.  Stated that she tripped over her dog and fell forward landing on her left hip.  Denies any head injury, loss of consciousness, neck pain, numbness or tingling, or any new weaknesses.  Required help from her family to get back up, but unable to bear weight on her left hip due to the pain.  Denies any anticoagulation.Sister at bedside. Of note, she fell a few weeks ago because she was "dehydrated" and required staples in her head.     In the ED, patient was hypertensive with systolic blood pressure in the 190s.  No hypoxia.  Labs with normocytic anemia, appears to be near baseline.  Left hip x-ray and CT left hip with roximal left femoral fracture.  CT head with no acute intracranial abnormalities.  Chest x-ray with no large focal consolidation.  Orthopedic surgery was consulted in the ED (Dr. Greene spoke with Dr. Caal).  Admitted to hospital medicine for further evaluation and management    Overview/Hospital Course:  84 y.o. female admitted on 09/14/24 for acute proximal left femoral fracture.  Orthopedic surgery consulted-s/p intramedullary nailing of the left hip on 09/15/2024.  PT/OT consulted postoperatively-recommends moderate intensity there.  Also found to have hypomagnesemia, electrolytes replaced as needed. Hgb 6.6 on 09/16, transfused 1U pRBC oon 09/16.  Continue symptomatic management and supportive care.  Pain control.  Overnight on 09/16, patient with acute hypoxia with O2 sats in the 80s, requiring nasal cannula with " improvement.  History of diastolic heart failure.  BNP 1062.  Of note patient received fluids postoperatively and also unit of blood on 09/16, likely overloaded. CXR w/Right basilar opacity, which may be related to atelectasis or early infiltrate.  Given IV Lasix with improvement.  Encourage IS.  Wean O2 as tolerated    Interval History:  No acute overnight events.  Patient remained afebrile.  Complains of left hip pain, but better controlled today with pain medications.  Currently on nasal cannula, but patient denies any shortness a breath.  No coughing.  Admits fatigue    Review of Systems   Constitutional:  Positive for activity change and fatigue. Negative for chills and fever.   Respiratory:  Negative for cough, chest tightness and shortness of breath.    Cardiovascular:  Negative for chest pain.   Musculoskeletal:  Positive for arthralgias and gait problem. Negative for joint swelling.   Neurological:  Positive for weakness. Negative for dizziness, syncope and headaches.   Psychiatric/Behavioral:  Negative for confusion and hallucinations.      Objective:     Vital Signs (Most Recent):  Temp: 98.2 °F (36.8 °C) (09/17/24 1033)  Pulse: 100 (09/17/24 1138)  Resp: 16 (09/17/24 1033)  BP: 137/73 (09/17/24 1033)  SpO2: 99 % (09/17/24 1300) Vital Signs (24h Range):  Temp:  [98 °F (36.7 °C)-98.9 °F (37.2 °C)] 98.2 °F (36.8 °C)  Pulse:  [] 100  Resp:  [16-18] 16  SpO2:  [90 %-100 %] 99 %  BP: (111-137)/(56-73) 137/73     Weight: 51 kg (112 lb 7 oz)  Body mass index is 19.92 kg/m².    Intake/Output Summary (Last 24 hours) at 9/17/2024 1339  Last data filed at 9/17/2024 1211  Gross per 24 hour   Intake 787 ml   Output 1150 ml   Net -363 ml         Physical Exam  Vitals and nursing note reviewed.   Constitutional:       General: She is not in acute distress.     Appearance: She is not ill-appearing.   HENT:      Nose: Nose normal.      Mouth/Throat:      Mouth: Mucous membranes are moist.   Eyes:      Extraocular  Movements: Extraocular movements intact.   Cardiovascular:      Rate and Rhythm: Normal rate and regular rhythm.      Heart sounds: No murmur heard.  Pulmonary:      Effort: Pulmonary effort is normal. No respiratory distress.      Comments: On nasal cannula, diminished breath sounds in RLL  Abdominal:      General: There is no distension.      Palpations: Abdomen is soft.   Musculoskeletal:         General: Tenderness (left hip pain) present. No swelling.      Right lower leg: No edema.      Left lower leg: No edema.      Comments: ROM limited due to pain. She appears to be neurovascular intact distally to left lower extremity.   Skin:     General: Skin is warm and dry.      Coloration: Skin is pale.      Comments: Incision/ dressing clean/dry/intact   Neurological:      General: No focal deficit present.      Mental Status: She is alert and oriented to person, place, and time.   Psychiatric:         Mood and Affect: Mood normal.         Thought Content: Thought content normal.             Significant Labs: All pertinent labs within the past 24 hours have been reviewed.    Significant Imaging: I have reviewed all pertinent imaging results/findings within the past 24 hours.    Assessment/Plan:      * Closed fracture of left hip  -presented with left hip pain after a mechanical fall (she tripped over her puppy).   -Left hip x-ray and CT left hip with roximal left femoral fracture.    -CT head with no acute intracranial abnormalities.    -Chest x-ray with no large focal consolidation.    -Orthopedic surgery was consulted:s/p  intramedullary nailing of the left hip on 09/15/2024.    -PT/OT consulted:  Recommends moderate intensity therapy    Acute on chronic diastolic heart failure  -history of grade 1 diastolic dysfunction, preserved EF  -hypoxic overnight, O2 sats in the 80s require nasal cannula for improvement.  -of note, patient received a unit of packed red blood cells on 09/16, and also fluids  postoperatively  -BNP elevated 062  -chest x-ray with right basilar opacity  -given IV lasix 20mg x 2 with improvement.  -encourage IS   -wean O2 as tolerated      Acute respiratory failure with hypoxia  Patient with Hypoxic Respiratory failure which is Acute.  she is not on home oxygen. Supplemental oxygen was provided and noted- Oxygen Concentration (%):  [2] 2    .   Signs/symptoms of respiratory failure include- tachypnea and increased work of breathing. Contributing diagnoses includes - CHF Labs and images were reviewed. Patient Has recent ABG, which has been reviewed. Will treat underlying causes and adjust management of respiratory failure as follows-     -secondary to CHF.   -see plan as above for acute on chronic diastolic heart failure   -wean O2 as tolerated    HLD (hyperlipidemia)  -resume home pravastatin      Anemia  Anemia is likely due to acute blood loss which was from surgery (s/p left hip intramedullary nailing on 09/15)  and Iron deficiency. Most recent hemoglobin and hematocrit are listed below.  Recent Labs     09/16/24  1423 09/16/24  2230 09/17/24  0443   HGB 8.8* 8.6* 8.4*  8.3*   HCT 26.6* 25.3* 26.0*  25.5*       Plan  - Monitor serial CBC: Daily  - Transfuse PRBC if patient becomes hemodynamically unstable, symptomatic or H/H drops below 7/21.  - Patient has received 1 units of PRBCs on 09/16/2024  - Patient's anemia is currently stable  - monitor.     Hypomagnesemia  Patient has Abnormal Magnesium: hypomagnesemia. Will continue to monitor electrolytes closely. Will replace the affected electrolytes and repeat labs to be done after interventions completed. The patient's magnesium results have been reviewed and are listed below.  Recent Labs   Lab 09/17/24  0443   MG 1.5*      Continue to replace as needed    Hypophosphatemia  Patient has Abnormal Phosphorus: hypophosphatemia. Will continue to monitor electrolytes closely. Will replace the affected electrolytes and repeat labs to be done  after interventions completed. The patient's phosphorus results have been reviewed and are listed below.  Recent Labs   Lab 09/17/24  0443   PHOS 2.4*      -replace as needed    Hypertension  Chronic, controlled.   -resume home amlodipine   -BP uncontrolled, titrate home amlodipine. Increased to 10mg on 09/15  -blood pressure better controlled  -IV hydralazine PRN for SBP>180    Latest blood pressure and vitals reviewed-     Temp:  [98 °F (36.7 °C)-98.9 °F (37.2 °C)]   Pulse:  []   Resp:  [16-18]   BP: (111-137)/(56-73)   SpO2:  [90 %-100 %] .   Home meds for hypertension were reviewed and noted below.   Hypertension Medications               amLODIPine (NORVASC) 5 MG tablet Take 1 tablet (5 mg total) by mouth once daily.            While in the hospital, will manage blood pressure as follows; Continue home antihypertensive regimen    Will utilize p.r.n. blood pressure medication only if patient's blood pressure greater than 180/110 and she develops symptoms such as worsening chest pain or shortness of breath.    Type 2 diabetes mellitus  Patient's FSGs are controlled on current medication regimen.  Last A1c reviewed-   Lab Results   Component Value Date    HGBA1C 7.4 (H) 09/14/2024     Most recent fingerstick glucose reviewed-   Recent Labs   Lab 09/16/24  1529 09/16/24  2158 09/17/24  1206   POCTGLUCOSE 253* 282* 297*       Current correctional scale  Low  Maintain anti-hyperglycemic dose as follows-   Antihyperglycemics (From admission, onward)      Start     Stop Route Frequency Ordered    09/14/24 1608  insulin aspart U-100 pen 0-5 Units         -- SubQ Before meals & nightly PRN 09/14/24 1509          Hold Oral hypoglycemics while patient is in the hospital.  Repeat A1C- 7.4  Meds: SSI PRN to maintain goal 140-180  ADA diet when appropriate, accuchecks ACHS, hypoglycemic protocol      Advanced care planning/counseling discussion  Advance Care Planning    Date: 09/14/2024    Code Status  I engaged the the  patient in a voluntary conversation about the patient's preferences for care  at the very end of life. The patient wishes to have CPR or other invasive treatments performed when her heart and/or breathing stops. I communicated to the patient that her wishes align with full code status and she agrees. Daughter at bedside and present for discussion.     A total of 16 min was spent on advance care planning, goals of care discussion, emotional support, formulating and communicating prognosis and exploring burden/benefit of various approaches of treatment. This discussion occurred on a fully voluntary basis with the verbal consent of the patient and/or family.             VTE Risk Mitigation (From admission, onward)           Ordered     enoxaparin injection 40 mg  Daily         09/14/24 1509     IP VTE HIGH RISK PATIENT  Once         09/14/24 1509     Place sequential compression device  Until discontinued         09/14/24 1509                    Discharge Planning   OSCRA:      Code Status: Full Code   Is the patient medically ready for discharge?:     Reason for patient still in hospital (select all that apply): Patient trending condition, Treatment, Consult recommendations, and PT / OT recommendations  Discharge Plan A: Skilled Nursing Facility                  Dick Ham DO  Department of Hospital Medicine   [facility] [place] [facility]

## 2024-09-17 NOTE — PT/OT/SLP EVAL
Physical Therapy Evaluation    Patient Name:  Marya Carranza   MRN:  [id number]    Recommendations:     Discharge Recommendations: Moderate Intensity Therapy   Discharge Equipment Recommendations: to be determined by next level of care   Barriers to discharge: Decreased caregiver support    Assessment:     Marya Carranza is a 84 y.o. female admitted with a medical diagnosis of Closed fracture of left hip.  She presents with the following impairments/functional limitations: weakness, impaired endurance, impaired sensation, impaired self care skills, impaired balance, gait instability, impaired functional mobility, decreased ROM .      Pt very pleasant and willing to participate in tx session this date; pt w/ significant pain, requiring max A x 2 persons for supine>sit, sit>stand and laterally stepping to HOB. Pt will continue to benefit from skilled acute OT services to maximize functional capacity.     Rehab Prognosis: Good; patient would benefit from acute skilled PT services to address these deficits and reach maximum level of function.    Recent Surgery: Procedure(s) (LRB):  INSERTION, INTRAMEDULLARY GONZALEZ, FEMUR (Left) 2 Days Post-Op    Plan:     During this hospitalization, patient to be seen BID to address the identified rehab impairments via therapeutic activities, therapeutic exercises, neuromuscular re-education, gait training and progress toward the following goals:    Plan of Care Expires:  09/30/24    Subjective     Chief Complaint: Pt states that her leg will not move  Patient/Family Comments/goals: Rehab after hospital stay  Pain/Comfort:       Patients cultural, spiritual, [employer] conflicts given the current situation: no    Living Environment:  Occupational Profile:  Living Environment: Pt lives alone in [facility].   Previous level of function: Pt was mod I w/ ADLs and functional mobility. Driving.   Roles and Routines: None stated   Equipment Used at Home: cane, quad  Assistance upon Discharge: self; dtr        Objective:     Communicated with nursing prior to session.  Patient found HOB elevated with telemetry, peripheral IV, bed alarm  upon PT entry to room.    General Precautions: Standard, fall  Orthopedic Precautions:LLE weight bearing as tolerated   Braces: N/A  Respiratory Status: Nasal cannula, flow 2 L/min    Exams:  Cognitive Exam:  Patient is oriented to Person, Place, Time, and Situation  Gross Motor Coordination:  WFL  RLE ROM: WFL  RLE Strength: WFL  LLE ROM: Deficits:    LLE Strength: Deficits:      Functional Mobility:  Bed Mobility:     Scooting: moderate assistance  Supine to Sit: maximal assistance and of 2 persons  Sit to Supine: maximal assistance and of 2 persons  Transfers:     Sit to Stand:  maximal assistance and of 2 persons with rolling walker  Gait: 5 steps to the right to get to HOB , RW, PT had to move L+ LE and max A for weight shifting   Balance: Static Sit Fair, Static Stand Poor       AM-PAC 6 CLICK MOBILITY  Total Score:        Treatment & Education:  Eval   TA 1:1 x 8 min for sit <>stand x 2 bed to RW, and side step to HOB which required significant amount of time due to pt inability to negotiate RW and/or L+ LE    Patient left HOB elevated with all lines intact, call button in reach, bed alarm on, and nursing notified.    GOALS:   Multidisciplinary Problems       Physical Therapy Goals          Problem: Physical Therapy    Goal Priority Disciplines Outcome Goal Variances Interventions   Physical Therapy Goal     PT, PT/OT Progressing     Description: Goals to be met by: 2024     Patient will increase functional independence with mobility by performin. Supine to sit with MInimal Assistance  2. Sit to supine with MInimal Assistance  3. Sit to stand transfer with Minimal Assistance  4. Bed to chair transfer with Minimal Assistance using Rolling Walker  5. Gait  x 100 feet with Minimal Assistance using Rolling Walker.                          History:     Past Medical  History:   Diagnosis Date    Diabetes mellitus     Hypertension        Past Surgical History:   Procedure Laterality Date    HYSTERECTOMY      INTRAMEDULLARY RODDING OF FEMUR Left 9/15/2024    Procedure: INSERTION, INTRAMEDULLARY GONZALEZ, FEMUR;  Surgeon: Jose Alberto Caal MD;  Location: [facility];  Service: Orthopedics;  Laterality: Left;    OOPHORECTOMY         Time Tracking:     PT Received On:    PT Start Time: 1625     PT Stop Time: 1650  PT Total Time (min): 25 min     Billable Minutes: Evaluation 1 and Therapeutic Activity 1      09/17/2024

## 2024-09-17 NOTE — ASSESSMENT & PLAN NOTE
-history of grade 1 diastolic dysfunction, preserved EF  -hypoxic overnight, O2 sats in the 80s require nasal cannula for improvement.  -of note, patient received a unit of packed red blood cells on 09/16, and also fluids postoperatively  -BNP elevated 062  -chest x-ray with right basilar opacity  -given IV lasix 20mg x 2 with improvement.  -encourage IS   -wean O2 as tolerated

## 2024-09-17 NOTE — ASSESSMENT & PLAN NOTE
Patient's FSGs are controlled on current medication regimen.  Last A1c reviewed-   Lab Results   Component Value Date    HGBA1C 7.4 (H) 09/14/2024     Most recent fingerstick glucose reviewed-   Recent Labs   Lab 09/16/24  1529 09/16/24  2158 09/17/24  1206   POCTGLUCOSE 253* 282* 297*       Current correctional scale  Low  Maintain anti-hyperglycemic dose as follows-   Antihyperglycemics (From admission, onward)    Start     Stop Route Frequency Ordered    09/14/24 1608  insulin aspart U-100 pen 0-5 Units         -- SubQ Before meals & nightly PRN 09/14/24 1509        Hold Oral hypoglycemics while patient is in the hospital.  Repeat A1C- 7.4  Meds: SSI PRN to maintain goal 140-180  ADA diet when appropriate, accuchecks ACHS, hypoglycemic protocol

## 2024-09-17 NOTE — PLAN OF CARE
Changes in medical condition or discharge plan: No    Does patient need new DME? TBD    Follow up appts needed: Orthopedics    Medically stable: No    Estimated Discharge Date: TBD     Per attending, patient not medically stable for discharge.  Patient underwent left hip IM nailing for intertrochanteric hip fracture on 9/15; PT/OT recommending SNF; CM to follow up with patient on preference, call in LOCET and complete PASRR.  Patient also being weaned off Oxygen as tolerated and currently uses a rolling walker at home. CM to continue to assess for and until discharge.   09/17/24 6581   Discharge Reassessment   Assessment Type Discharge Planning Reassessment   Did the patient's condition or plan change since previous assessment? No   Communicated OSCAR with patient/caregiver Date not available/Unable to determine   Discharge Plan A Skilled Nursing Facility   Discharge Plan B Home Health   DME Needed Upon Discharge  other (see comments)  (TBD)   Transition of Care Barriers None   Why the patient remains in the hospital Requires continued medical care   Post-Acute Status   Hospital Resources/Appts/Education Provided Provided patient/caregiver with written discharge plan information   Discharge Delays None known at this time       4:25pm: LOCET called in and PASRR faxed; awaiting 142.

## 2024-09-17 NOTE — ASSESSMENT & PLAN NOTE
-presented with left hip pain after a mechanical fall (she tripped over her puppy).   -Left hip x-ray and CT left hip with roximal left femoral fracture.    -CT head with no acute intracranial abnormalities.    -Chest x-ray with no large focal consolidation.    -Orthopedic surgery was consulted:s/p  intramedullary nailing of the left hip on 09/15/2024.    -PT/OT consulted:  Recommends moderate intensity therapy

## 2024-09-17 NOTE — ASSESSMENT & PLAN NOTE
Patient has Abnormal Phosphorus: hypophosphatemia. Will continue to monitor electrolytes closely. Will replace the affected electrolytes and repeat labs to be done after interventions completed. The patient's phosphorus results have been reviewed and are listed below.  Recent Labs   Lab 09/17/24  0443   PHOS 2.4*      -replace as needed

## 2024-09-17 NOTE — NURSING
Patient lying in bed, no resp distress noted. Oxygen on and running, contin pulse ox. VS stable, telemetry monitor on NSR noted. Denies heart burns, administered hydrocodone for lt hip pain prior to therapy. SCD on and running. Sat up in bed, ate 75% of breakfast.

## 2024-09-17 NOTE — ASSESSMENT & PLAN NOTE
Anemia is likely due to acute blood loss which was from surgery (s/p left hip intramedullary nailing on 09/15)  and Iron deficiency. Most recent hemoglobin and hematocrit are listed below.  Recent Labs     09/16/24  1423 09/16/24  2230 09/17/24  0443   HGB 8.8* 8.6* 8.4*  8.3*   HCT 26.6* 25.3* 26.0*  25.5*       Plan  - Monitor serial CBC: Daily  - Transfuse PRBC if patient becomes hemodynamically unstable, symptomatic or H/H drops below 7/21.  - Patient has received 1 units of PRBCs on 09/16/2024  - Patient's anemia is currently stable  - monitor.

## 2024-09-17 NOTE — SUBJECTIVE & OBJECTIVE
Interval History:  No acute overnight events.  Patient remained afebrile.  Complains of left hip pain, but better controlled today with pain medications.  Currently on nasal cannula, but patient denies any shortness a breath.  No coughing.  Admits fatigue    Review of Systems   Constitutional:  Positive for activity change and fatigue. Negative for chills and fever.   Respiratory:  Negative for cough, chest tightness and shortness of breath.    Cardiovascular:  Negative for chest pain.   Musculoskeletal:  Positive for arthralgias and gait problem. Negative for joint swelling.   Neurological:  Positive for weakness. Negative for dizziness, syncope and headaches.   Psychiatric/Behavioral:  Negative for confusion and hallucinations.      Objective:     Vital Signs (Most Recent):  Temp: 98.2 °F (36.8 °C) (09/17/24 1033)  Pulse: 100 (09/17/24 1138)  Resp: 16 (09/17/24 1033)  BP: 137/73 (09/17/24 1033)  SpO2: 99 % (09/17/24 1300) Vital Signs (24h Range):  Temp:  [98 °F (36.7 °C)-98.9 °F (37.2 °C)] 98.2 °F (36.8 °C)  Pulse:  [] 100  Resp:  [16-18] 16  SpO2:  [90 %-100 %] 99 %  BP: (111-137)/(56-73) 137/73     Weight: 51 kg (112 lb 7 oz)  Body mass index is 19.92 kg/m².    Intake/Output Summary (Last 24 hours) at 9/17/2024 1339  Last data filed at 9/17/2024 1211  Gross per 24 hour   Intake 787 ml   Output 1150 ml   Net -363 ml         Physical Exam  Vitals and nursing note reviewed.   Constitutional:       General: She is not in acute distress.     Appearance: She is not ill-appearing.   HENT:      Nose: Nose normal.      Mouth/Throat:      Mouth: Mucous membranes are moist.   Eyes:      Extraocular Movements: Extraocular movements intact.   Cardiovascular:      Rate and Rhythm: Normal rate and regular rhythm.      Heart sounds: No murmur heard.  Pulmonary:      Effort: Pulmonary effort is normal. No respiratory distress.      Comments: On nasal cannula, diminished breath sounds in RLL  Abdominal:      General: There  is no distension.      Palpations: Abdomen is soft.   Musculoskeletal:         General: Tenderness (left hip pain) present. No swelling.      Right lower leg: No edema.      Left lower leg: No edema.      Comments: ROM limited due to pain. She appears to be neurovascular intact distally to left lower extremity.   Skin:     General: Skin is warm and dry.      Coloration: Skin is pale.      Comments: Incision/ dressing clean/dry/intact   Neurological:      General: No focal deficit present.      Mental Status: She is alert and oriented to person, place, and time.   Psychiatric:         Mood and Affect: Mood normal.         Thought Content: Thought content normal.             Significant Labs: All pertinent labs within the past 24 hours have been reviewed.    Significant Imaging: I have reviewed all pertinent imaging results/findings within the past 24 hours.

## 2024-09-17 NOTE — PLAN OF CARE
Problem: Occupational Therapy  Goal: Occupational Therapy Goal  Description: Goals to be met by: 9/30/24     Patient will increase functional independence with ADLs by performing:    LE Dressing with Minimal Assistance.  Grooming while seated with Modified [place].  Toileting from toilet with Minimal Assistance for hygiene and clothing management.   Step transfer with Minimal Assistance  Toilet transfer to bedside commode with Minimal Assistance.  Upper extremity exercise program x15 reps per handout, with independence.    Outcome: Progressing

## 2024-09-17 NOTE — ASSESSMENT & PLAN NOTE
Patient has Abnormal Magnesium: hypomagnesemia. Will continue to monitor electrolytes closely. Will replace the affected electrolytes and repeat labs to be done after interventions completed. The patient's magnesium results have been reviewed and are listed below.  Recent Labs   Lab 09/17/24  0443   MG 1.5*      Continue to replace as needed

## 2024-09-17 NOTE — PT/OT/SLP PROGRESS
Occupational Therapy   Treatment    Name: Marya Carranza  MRN: [id number]  Admitting Diagnosis:  Closed fracture of left hip  2 Days Post-Op    Recommendations:     Discharge Recommendations: Moderate Intensity Therapy  Discharge Equipment Recommendations:  to be determined by next level of care  Barriers to discharge:   (Pt is at high risk for falls and readmission if d/c home at this time.)    Assessment:     Marya Carranza is a 84 y.o. female with a medical diagnosis of Closed fracture of left hip.   Performance deficits affecting function are weakness, impaired endurance, impaired self care skills, impaired functional mobility, gait instability, impaired balance, decreased coordination, decreased upper extremity function, decreased lower extremity function, decreased safety awareness, pain, decreased ROM, edema, orthopedic precautions.     Pt willing to participate in tx session this date; pt w/ onset of dizziness and decrease in spO2 vitals (82%) w/ functional ambulation this date. Pt w/ pain to LLE, requiring min-mod A for supine>sit to stand from EOB w/ mod A and use of RW to ambulate a short distance to chair w/ max A and use of RW. Pt will continue to benefit from skilled acute OT services to maximize functional capacity.     Rehab Prognosis:  Good; patient would benefit from acute skilled OT services to address these deficits and reach maximum level of function.       Plan:     Patient to be seen  (3-5x/wk) to address the above listed problems via self-care/home management, therapeutic activities, therapeutic exercises  Plan of Care Expires: 09/30/24  Plan of Care Reviewed with: patient    Subjective     Chief Complaint: pain w/ movement   Patient/Family Comments/goals: agreeable to participate   Pain/Comfort:  Pain Rating 1:  (Pt did not rate.)  Location - Side 1: Left  Location 1: hip  Pain Addressed 1: Reposition, Distraction, Cessation of Activity    Objective:     Communicated with: Nurse prior to  session.  Patient found up in chair with telemetry, peripheral IV, bed alarm upon OT entry to room.    General Precautions: Standard, fall    Orthopedic Precautions:LLE weight bearing as tolerated  Braces: N/A  Respiratory Status: Nasal cannula, flow 2 L/min at 95%; desat to low 80s w/ ambulation on RA     Occupational Performance:     Bed Mobility:    Patient completed Scooting/Bridging with minimum assistance and moderate assistance  Patient completed Supine to Sit with minimum assistance and moderate assistance     Functional Mobility/Transfers:  Patient completed Sit <> Stand Transfer x 1 trial from EOB with moderate assistance and of 1 persons  with  rolling walker   Patient completed Bed <> Chair Transfer using Step Transfer technique with maximal assistance and of 1 persons with rolling walker; PT roviding assist from R side and OT at L side providing assist w/ manually advancing LLE; pt w/ complaints of worsening dizziness in which she required assist for pivoting to chair once close enough  Functional Mobility: Pt was able to ambulate a short distance to chair w/ close min A-CGA and use of RW; refer to OT note for details.     Activities of Daily Living:  Upper Body Dressing: minimum assistance for donning gown over back       [facility] 6 Click ADL: 15    Treatment & Education:  -Pt educated on role of OT and POC.   -Pt performed ADLs and functional mobility as noted above.   -Questions and concerns addressed within scope.     Patient left up in chair with all lines intact and call button in reach    GOALS:   Multidisciplinary Problems       Occupational Therapy Goals          Problem: Occupational Therapy    Goal Priority Disciplines Outcome Interventions   Occupational Therapy Goal     OT, PT/OT Progressing    Description: Goals to be met by: 9/30/24     Patient will increase functional independence with ADLs by performing:    LE Dressing with Minimal Assistance.  Grooming while seated with Modified  [place].  Toileting from toilet with Minimal Assistance for hygiene and clothing management.   Step transfer with Minimal Assistance  Toilet transfer to bedside commode with Minimal Assistance.  Upper extremity exercise program x15 reps per handout, with independence.                         Time Tracking:     OT Date of Treatment: 09/17/24  OT Start Time: 1040  OT Stop Time: 1057  OT Total Time (min): 17 min    Billable Minutes:Therapeutic Activity 17  Total Time 17 (co-tx w/ PT)     OT/JUANCARLOS: OT          9/17/2024

## 2024-09-17 NOTE — NURSING
[facility] [place]  Nurses Note -- 4 Eyes      9/17/2024       Skin assessed on: Q Shift      [x] No Pressure Injuries Present    [x]Prevention Measures Documented    [] Yes LDA  for Pressure Injury Previously documented     [] Yes New Pressure Injury Discovered   [] LDA for New Pressure Injury Added      Attending RN:  Sheila Cam RN     Second RN:  Camille

## 2024-09-17 NOTE — PT/OT/SLP PROGRESS
Physical Therapy Treatment    Patient Name:  Marya Carranza   MRN:  [id number]    Recommendations:     Discharge Recommendations: Moderate Intensity Therapy  Discharge Equipment Recommendations: to be determined by next level of care  Barriers to discharge: Decreased caregiver support    Assessment:     Marya Carranza is a 84 y.o. female admitted with a medical diagnosis of Closed fracture of left hip.  She presents with the following impairments/functional limitations: weakness, impaired endurance, impaired sensation, impaired self care skills, impaired balance, gait instability, impaired functional mobility, decreased ROM.    Pt continues to be severely limited due to pain and fear of falling     Rehab Prognosis: Good; patient would benefit from acute skilled PT services to address these deficits and reach maximum level of function.    Recent Surgery: Procedure(s) (LRB):  INSERTION, INTRAMEDULLARY GONZALEZ, FEMUR (Left) 2 Days Post-Op    Plan:     During this hospitalization, patient to be seen BID to address the identified rehab impairments via therapeutic activities, therapeutic exercises, neuromuscular re-education, gait training and progress toward the following goals:    Plan of Care Expires:  09/30/24    Subjective     Chief Complaint: Pt states that she feel better than yesterday   Patient/Family Comments/goals: Go to Rehab before returning home  Pain/Comfort:         Objective:     Communicated with nursing prior to session.  Patient found HOB elevated with pulse ox (continuous), oxygen, PureWick upon PT entry to room.     General Precautions: Standard, fall  Orthopedic Precautions: LLE weight bearing as tolerated  Braces: N/A  Respiratory Status: Nasal cannula, flow 2 L/min     Functional Mobility:  Bed Mobility:     Supine to Sit: minimum assistance and with HOB elevated and use of HR  Transfers:     Sit to Stand:  moderate assistance with rolling walker  Gait: 5 ft , RW , max A x 1 - max vc for placement and  advancement of L+ LE, mod assist for weight shifting   Balance: Static Sit Good       AM-PAC 6 CLICK MOBILITY  Turning over in bed (including adjusting bedclothes, sheets and blankets)?: 3  Sitting down on and standing up from a chair with arms (e.g., wheelchair, bedside commode, etc.): 3  Moving from lying on back to sitting on the side of the bed?: 3  Moving to and from a bed to a chair (including a wheelchair)?: 2  Need to walk in hospital room?: 2  Climbing 3-5 steps with a railing?: 2  Basic Mobility Total Score: 15       Treatment & Education:  TA 1:1 x 15 min including sup to sit to stand and ambulation to chair, which had to be brought to pt because pt became dizzy and was unable to ambulate across room which was where the chair was originally positioned   TE 1:1 x 8 including heel raises , heel slides, and LAQ 2 x 10 L+ only    Patient left up in chair with all lines intact, call button in reach, and nursing notified..    GOALS:   Multidisciplinary Problems       Physical Therapy Goals          Problem: Physical Therapy    Goal Priority Disciplines Outcome Goal Variances Interventions   Physical Therapy Goal     PT, PT/OT Progressing     Description: Goals to be met by: 2024     Patient will increase functional independence with mobility by performin. Supine to sit with MInimal Assistance  2. Sit to supine with MInimal Assistance  3. Sit to stand transfer with Minimal Assistance  4. Bed to chair transfer with Minimal Assistance using Rolling Walker  5. Gait  x 100 feet with Minimal Assistance using Rolling Walker.                          Time Tracking:     PT Received On:    PT Start Time: 1040     PT Stop Time: 1105  PT Total Time (min): 25 min     Billable Minutes: Therapeutic Activity 1 and Therapeutic Exercise 1    Treatment Type: Treatment  PT/PTA: PT     Number of PTA visits since last PT visit: 0     2024

## 2024-09-18 PROBLEM — E87.1 HYPONATREMIA: Status: ACTIVE | Noted: 2024-09-18

## 2024-09-18 LAB
ANION GAP SERPL CALC-SCNC: 10 MMOL/L (ref 8–16)
BUN SERPL-MCNC: 13 MG/DL (ref 8–23)
CALCIUM SERPL-MCNC: 8.2 MG/DL (ref 8.7–10.5)
CHLORIDE SERPL-SCNC: 91 MMOL/L (ref 95–110)
CO2 SERPL-SCNC: 28 MMOL/L (ref 23–29)
CREAT SERPL-MCNC: 0.7 MG/DL (ref 0.5–1.4)
EST. GFR  (NO RACE VARIABLE): >60 ML/MIN/1.73 M^2
GLUCOSE SERPL-MCNC: 219 MG/DL (ref 70–110)
HCT VFR BLD AUTO: 22.8 % (ref 37–48.5)
HGB BLD-MCNC: 7.7 G/DL (ref 12–16)
MAGNESIUM SERPL-MCNC: 1.7 MG/DL (ref 1.6–2.6)
POCT GLUCOSE: 241 MG/DL (ref 70–110)
POTASSIUM SERPL-SCNC: 4.1 MMOL/L (ref 3.5–5.1)
SODIUM SERPL-SCNC: 129 MMOL/L (ref 136–145)

## 2024-09-18 PROCEDURE — 25000003 PHARM REV CODE 250: Performed by: STUDENT IN AN ORGANIZED HEALTH CARE EDUCATION/TRAINING PROGRAM

## 2024-09-18 PROCEDURE — 94760 N-INVAS EAR/PLS OXIMETRY 1: CPT

## 2024-09-18 PROCEDURE — 85018 HEMOGLOBIN: CPT | Performed by: ORTHOPAEDIC SURGERY

## 2024-09-18 PROCEDURE — 97110 THERAPEUTIC EXERCISES: CPT

## 2024-09-18 PROCEDURE — 21400001 HC TELEMETRY ROOM

## 2024-09-18 PROCEDURE — 80048 BASIC METABOLIC PNL TOTAL CA: CPT | Performed by: ORTHOPAEDIC SURGERY

## 2024-09-18 PROCEDURE — 97530 THERAPEUTIC ACTIVITIES: CPT

## 2024-09-18 PROCEDURE — 27000221 HC OXYGEN, UP TO 24 HOURS

## 2024-09-18 PROCEDURE — 99900035 HC TECH TIME PER 15 MIN (STAT)

## 2024-09-18 PROCEDURE — 36415 COLL VENOUS BLD VENIPUNCTURE: CPT | Performed by: ORTHOPAEDIC SURGERY

## 2024-09-18 PROCEDURE — 85014 HEMATOCRIT: CPT | Performed by: ORTHOPAEDIC SURGERY

## 2024-09-18 PROCEDURE — 63600175 PHARM REV CODE 636 W HCPCS: Performed by: ORTHOPAEDIC SURGERY

## 2024-09-18 PROCEDURE — 94799 UNLISTED PULMONARY SVC/PX: CPT

## 2024-09-18 PROCEDURE — 94761 N-INVAS EAR/PLS OXIMETRY MLT: CPT

## 2024-09-18 PROCEDURE — 25000003 PHARM REV CODE 250: Performed by: ORTHOPAEDIC SURGERY

## 2024-09-18 PROCEDURE — 83735 ASSAY OF MAGNESIUM: CPT | Performed by: STUDENT IN AN ORGANIZED HEALTH CARE EDUCATION/TRAINING PROGRAM

## 2024-09-18 RX ORDER — SODIUM,POTASSIUM PHOSPHATES 280-250MG
2 POWDER IN PACKET (EA) ORAL
Status: COMPLETED | OUTPATIENT
Start: 2024-09-18 | End: 2024-09-18

## 2024-09-18 RX ORDER — POLYETHYLENE GLYCOL 3350 17 G/17G
17 POWDER, FOR SOLUTION ORAL DAILY
Status: DISCONTINUED | OUTPATIENT
Start: 2024-09-18 | End: 2024-09-20

## 2024-09-18 RX ORDER — LANOLIN ALCOHOL/MO/W.PET/CERES
800 CREAM (GRAM) TOPICAL ONCE
Status: COMPLETED | OUTPATIENT
Start: 2024-09-18 | End: 2024-09-18

## 2024-09-18 RX ORDER — FUROSEMIDE 10 MG/ML
40 INJECTION INTRAMUSCULAR; INTRAVENOUS EVERY 12 HOURS
Status: DISCONTINUED | OUTPATIENT
Start: 2024-09-18 | End: 2024-09-18

## 2024-09-18 RX ADMIN — AMLODIPINE BESYLATE 10 MG: 5 TABLET ORAL at 09:09

## 2024-09-18 RX ADMIN — MAGNESIUM OXIDE TAB 400 MG (241.3 MG ELEMENTAL MG) 800 MG: 400 (241.3 MG) TAB at 11:09

## 2024-09-18 RX ADMIN — DOCUSATE SODIUM 100 MG: 100 CAPSULE, LIQUID FILLED ORAL at 08:09

## 2024-09-18 RX ADMIN — PANTOPRAZOLE SODIUM 40 MG: 40 TABLET, DELAYED RELEASE ORAL at 09:09

## 2024-09-18 RX ADMIN — POLYETHYLENE GLYCOL 3350 17 G: 17 POWDER, FOR SOLUTION ORAL at 11:09

## 2024-09-18 RX ADMIN — Medication 2 PACKET: at 11:09

## 2024-09-18 RX ADMIN — HYDROCODONE BITARTRATE AND ACETAMINOPHEN 1 TABLET: 7.5; 325 TABLET ORAL at 05:09

## 2024-09-18 RX ADMIN — HYDROCODONE BITARTRATE AND ACETAMINOPHEN 1 TABLET: 7.5; 325 TABLET ORAL at 12:09

## 2024-09-18 RX ADMIN — DOCUSATE SODIUM 100 MG: 100 CAPSULE, LIQUID FILLED ORAL at 09:09

## 2024-09-18 RX ADMIN — PRAVASTATIN SODIUM 40 MG: 40 TABLET ORAL at 09:09

## 2024-09-18 RX ADMIN — ENOXAPARIN SODIUM 40 MG: 40 INJECTION SUBCUTANEOUS at 04:09

## 2024-09-18 RX ADMIN — HYDROCODONE BITARTRATE AND ACETAMINOPHEN 1 TABLET: 7.5; 325 TABLET ORAL at 03:09

## 2024-09-18 RX ADMIN — HYDROCODONE BITARTRATE AND ACETAMINOPHEN 1 TABLET: 7.5; 325 TABLET ORAL at 09:09

## 2024-09-18 RX ADMIN — Medication 2 PACKET: at 04:09

## 2024-09-18 NOTE — PROGRESS NOTES
"[facility] Medicine  Progress Note    Patient Name: Marya Carranza  MRN: [id number]  Patient Class: IP- Inpatient   Admission Date: 9/14/2024  Length of Stay: 4 days  Attending Physician: Dick Ham DO  Primary Care Provider: Phillip Levy MD        Subjective:     Principal Problem:Closed fracture of left hip        HPI:   84 y.o. female, with a PMHx of DM, HTN, HLD presents ED via EMS with complaints of left hip pain after sustaining a fall today.  Stated that she tripped over her dog and fell forward landing on her left hip.  Denies any head injury, loss of consciousness, neck pain, numbness or tingling, or any new weaknesses.  Required help from her family to get back up, but unable to bear weight on her left hip due to the pain.  Denies any anticoagulation.Sister at bedside. Of note, she fell a few weeks ago because she was "dehydrated" and required staples in her head.     In the ED, patient was hypertensive with systolic blood pressure in the 190s.  No hypoxia.  Labs with normocytic anemia, appears to be near baseline.  Left hip x-ray and CT left hip with roximal left femoral fracture.  CT head with no acute intracranial abnormalities.  Chest x-ray with no large focal consolidation.  Orthopedic surgery was consulted in the ED (Dr. Greene spoke with Dr. Caal).  Admitted to hospital medicine for further evaluation and management    Overview/Hospital Course:  84 y.o. female admitted on 09/14/24 for acute proximal left femoral fracture.  Orthopedic surgery consulted-s/p intramedullary nailing of the left hip on 09/15/2024.  PT/OT consulted postoperatively-recommends moderate intensity there.  Also found to have hypomagnesemia, electrolytes replaced as needed. Hgb 6.6 on 09/16, transfused 1U pRBC oon 09/16.  Continue symptomatic management and supportive care.  Pain control.  Overnight on 09/16, patient with acute hypoxia with O2 sats in the 80s, requiring nasal cannula with " improvement.  History of diastolic heart failure.  BNP 1062.  Of note patient received fluids postoperatively and also unit of blood on 09/16, likely overloaded. CXR w/Right basilar opacity, which may be related to atelectasis or early infiltrate.  Given IV Lasix with improvement.  Encourage IS.  Wean O2 as tolerated.     Interval History:  No acute overnight events.  Patient remained afebrile.  Continues to require supplemental oxygen, currently on 3 L this morning.  Denies any chest pain or shortness a breath though.  States she is feeling much better this morning, pain is better controlled.  Able to sleep well last night.      Review of Systems   Constitutional:  Positive for activity change and fatigue. Negative for chills and fever.   Respiratory:  Negative for cough, chest tightness and shortness of breath.    Cardiovascular:  Negative for chest pain.   Musculoskeletal:  Positive for arthralgias (improving) and gait problem. Negative for joint swelling.   Neurological:  Positive for weakness.   Psychiatric/Behavioral:  Negative for confusion and hallucinations.      Objective:     Vital Signs (Most Recent):  Temp: 98.3 °F (36.8 °C) (09/18/24 0731)  Pulse: 84 (09/18/24 0731)  Resp: 18 (09/18/24 0731)  BP: 133/61 (09/18/24 0731)  SpO2: (!) 92 % (09/18/24 1012) Vital Signs (24h Range):  Temp:  [97.4 °F (36.3 °C)-98.6 °F (37 °C)] 98.3 °F (36.8 °C)  Pulse:  [] 84  Resp:  [16-20] 18  SpO2:  [91 %-100 %] 92 %  BP: (133-147)/(61-67) 133/61     Weight: 58.1 kg (128 lb 1.4 oz)  Body mass index is 22.69 kg/m².    Intake/Output Summary (Last 24 hours) at 9/18/2024 1118  Last data filed at 9/18/2024 0957  Gross per 24 hour   Intake 920 ml   Output 800 ml   Net 120 ml         Physical Exam  Vitals and nursing note reviewed.   Constitutional:       General: She is not in acute distress.     Appearance: She is not ill-appearing.   HENT:      Nose: Nose normal.      Mouth/Throat:      Mouth: Mucous membranes are moist.    Eyes:      Extraocular Movements: Extraocular movements intact.   Cardiovascular:      Rate and Rhythm: Normal rate and regular rhythm.      Heart sounds: No murmur heard.  Pulmonary:      Effort: Pulmonary effort is normal. No respiratory distress.      Comments: On nasal cannula, RLL breath sounds improving.   Abdominal:      General: There is no distension.      Palpations: Abdomen is soft.   Musculoskeletal:         General: Tenderness (left hip pain) present. No swelling.      Right lower leg: No edema.      Left lower leg: No edema.      Comments: ROM limited due to pain. She appears to be neurovascular intact distally to left lower extremity.   Skin:     General: Skin is warm and dry.      Coloration: Skin is pale.      Comments: Incision/ dressing clean/dry/intact   Neurological:      General: No focal deficit present.      Mental Status: She is alert and oriented to person, place, and time.   Psychiatric:         Mood and Affect: Mood normal.         Thought Content: Thought content normal.             Significant Labs: All pertinent labs within the past 24 hours have been reviewed.    Significant Imaging: I have reviewed all pertinent imaging results/findings within the past 24 hours.    Assessment/Plan:      * Closed fracture of left hip  -presented with left hip pain after a mechanical fall (she tripped over her puppy).   -Left hip x-ray and CT left hip with roximal left femoral fracture.    -CT head with no acute intracranial abnormalities.    -Chest x-ray on admission  with no large focal consolidation.    -Orthopedic surgery was consulted:s/p  intramedullary nailing of the left hip on 09/15/2024.    -PT/OT consulted:  Recommends moderate intensity therapy    Acute on chronic diastolic heart failure  -history of grade 1 diastolic dysfunction, preserved EF  -hypoxic overnight, O2 sats in the 80s require nasal cannula for improvement.  -of note, patient received a unit of packed red blood cells on  09/16, and also fluids postoperatively  -BNP elevated 062  -chest x-ray with right basilar opacity  -given IV lasix 20mg x 2 with improvement. Lasix PRN  -encourage IS   -wean O2 as tolerated  -repeat CXR on 09/18 with improvement       Acute respiratory failure with hypoxia  Patient with Hypoxic Respiratory failure which is Acute.  she is not on home oxygen. Supplemental oxygen was provided and noted-      .   Signs/symptoms of respiratory failure include- tachypnea and increased work of breathing. Contributing diagnoses includes - CHF Labs and images were reviewed. Patient Has recent ABG, which has been reviewed. Will treat underlying causes and adjust management of respiratory failure as follows-     -secondary to CHF.   -see plan as above for acute on chronic diastolic heart failure   -wean O2 as tolerated    HLD (hyperlipidemia)  -resume home pravastatin      Anemia  Anemia is likely due to acute blood loss which was from surgery (s/p left hip intramedullary nailing on 09/15)  and Iron deficiency. Most recent hemoglobin and hematocrit are listed below.  Recent Labs     09/16/24  2230 09/17/24  0443 09/18/24  0438   HGB 8.6* 8.4*  8.3* 7.7*   HCT 25.3* 26.0*  25.5* 22.8*       Plan  - Monitor serial CBC: Daily  - Transfuse PRBC if patient becomes hemodynamically unstable, symptomatic or H/H drops below 7/21.  - Patient has received 1 units of PRBCs on 09/16/2024  - Patient's anemia is currently stable  - monitor.     Hypomagnesemia  Patient has Abnormal Magnesium: hypomagnesemia. Will continue to monitor electrolytes closely. Will replace the affected electrolytes and repeat labs to be done after interventions completed. The patient's magnesium results have been reviewed and are listed below.  Recent Labs   Lab 09/18/24  0438   MG 1.7      Continue to replace as needed    Hypophosphatemia  Patient has Abnormal Phosphorus: hypophosphatemia. Will continue to monitor electrolytes closely. Will replace the  "affected electrolytes and repeat labs to be done after interventions completed. The patient's phosphorus results have been reviewed and are listed below.  No results for input(s): "PHOS" in the last 24 hours.     -replace as needed    Hypertension  Chronic, controlled.   -resume home amlodipine   -BP uncontrolled, titrate home amlodipine. Increased to 10mg on 09/15  -blood pressure better controlled  -IV hydralazine PRN for SBP>180    Latest blood pressure and vitals reviewed-     Temp:  [97.4 °F (36.3 °C)-98.6 °F (37 °C)]   Pulse:  []   Resp:  [16-20]   BP: (133-147)/(61-67)   SpO2:  [91 %-100 %] .   Home meds for hypertension were reviewed and noted below.   Hypertension Medications               amLODIPine (NORVASC) 5 MG tablet Take 1 tablet (5 mg total) by mouth once daily.            While in the hospital, will manage blood pressure as follows; Continue home antihypertensive regimen    Will utilize p.r.n. blood pressure medication only if patient's blood pressure greater than 180/110 and she develops symptoms such as worsening chest pain or shortness of breath.    Type 2 diabetes mellitus  Patient's FSGs are controlled on current medication regimen.  Last A1c reviewed-   Lab Results   Component Value Date    HGBA1C 7.4 (H) 09/14/2024     Most recent fingerstick glucose reviewed-   Recent Labs   Lab 09/17/24  1206 09/17/24  1703 09/17/24  2343   POCTGLUCOSE 297* 309* 217*       Current correctional scale  Low  Maintain anti-hyperglycemic dose as follows-   Antihyperglycemics (From admission, onward)      Start     Stop Route Frequency Ordered    09/14/24 1608  insulin aspart U-100 pen 0-5 Units         -- SubQ Before meals & nightly PRN 09/14/24 1509          Hold Oral hypoglycemics while patient is in the hospital.  Repeat A1C- 7.4  Meds: SSI PRN to maintain goal 140-180  ADA diet when appropriate, accuchecks ACHS, hypoglycemic protocol      Advanced care planning/counseling discussion  Advance Care " Planning    Date: 09/14/2024    Code Status  I engaged the the patient in a voluntary conversation about the patient's preferences for care  at the very end of life. The patient wishes to have CPR or other invasive treatments performed when her heart and/or breathing stops. I communicated to the patient that her wishes align with full code status and she agrees. Daughter at bedside and present for discussion.     A total of 16 min was spent on advance care planning, goals of care discussion, emotional support, formulating and communicating prognosis and exploring burden/benefit of various approaches of treatment. This discussion occurred on a fully voluntary basis with the verbal consent of the patient and/or family.             VTE Risk Mitigation (From admission, onward)           Ordered     enoxaparin injection 40 mg  Daily         09/14/24 1509     IP VTE HIGH RISK PATIENT  Once         09/14/24 1509     Place sequential compression device  Until discontinued         09/14/24 1509                    Discharge Planning   OSCAR:      Code Status: Full Code   Is the patient medically ready for discharge?:     Reason for patient still in hospital (select all that apply): Patient trending condition, Treatment, Consult recommendations, and PT / OT recommendations  Discharge Plan A: Skilled Nursing Facility   Discharge Delays: None known at this time              Dick Ham DO  Department of Hospital Medicine   [facility] [place] [facility]

## 2024-09-18 NOTE — PLAN OF CARE
Problem: Adult Inpatient Plan of Care  Goal: Plan of Care Review  Flowsheets (Taken 9/18/2024 0357)  Plan of Care Reviewed With: patient  Goal: Absence of Hospital-Acquired Illness or Injury  Intervention: Identify and Manage Fall Risk  Flowsheets (Taken 9/18/2024 0357)  Safety Promotion/Fall Prevention:   Fall Risk reviewed with patient/family   instructed to call staff for mobility   lighting adjusted   medications reviewed   side rails raised x 3  Intervention: Prevent Skin Injury  Flowsheets (Taken 9/18/2024 0357)  Body Position: position changed independently  Intervention: Prevent and Manage VTE (Venous Thromboembolism) Risk  Flowsheets (Taken 9/18/2024 0357)  VTE Prevention/Management: ROM (active) performed  Goal: Optimal Comfort and Wellbeing  Intervention: Monitor Pain and Promote Comfort  Flowsheets (Taken 9/18/2024 0357)  Pain Management Interventions: pain management plan reviewed with patient/caregiver  Intervention: Provide Person-Centered Care  Flowsheets (Taken 9/18/2024 0357)  Trust Relationship/Rapport:   care explained   choices provided   questions answered   questions encouraged   reassurance provided   thoughts/feelings acknowledged     Problem: Pain Acute  Goal: Optimal Pain Control and Function  Intervention: Develop Pain Management Plan  Flowsheets (Taken 9/18/2024 0357)  Pain Management Interventions: pain management plan reviewed with patient/caregiver  Intervention: Prevent or Manage Pain  Flowsheets (Taken 9/18/2024 0357)  Sleep/Rest Enhancement: regular sleep/rest pattern promoted  Medication Review/Management: medications reviewed  Intervention: Optimize Psychosocial Wellbeing  Flowsheets (Taken 9/18/2024 0357)  Diversional Activities: television     Problem: Hip Arthroplasty  Goal: Optimal Functional Ability  Intervention: Promote Optimal Functional Status  Flowsheets (Taken 9/18/2024 0357)  Activity Management: Rolling - L1  Goal: Absence of Infection Signs and  Symptoms  Intervention: Prevent or Manage Infection  Flowsheets (Taken 9/18/2024 0357)  Infection Management:   aseptic technique maintained   cultures obtained and sent to lab  Goal: Anesthesia/Sedation Recovery  Intervention: Optimize Anesthesia Recovery  Flowsheets (Taken 9/18/2024 0357)  Safety Promotion/Fall Prevention:   Fall Risk reviewed with patient/family   instructed to call staff for mobility   lighting adjusted   medications reviewed   side rails raised x 3  Goal: Acceptable Pain Control  Intervention: Prevent or Manage Pain  Flowsheets (Taken 9/18/2024 0357)  Diversional Activities: television  Pain Management Interventions: pain management plan reviewed with patient/caregiver  Goal: Effective Urinary Elimination  Intervention: Monitor and Manage Urinary Retention  Flowsheets (Taken 9/18/2024 0357)  Urinary Elimination Promotion: voiding relaxation promoted  Goal: Effective Oxygenation and Ventilation  Intervention: Optimize Oxygenation and Ventilation  Flowsheets (Taken 9/18/2024 0357)  Airway/Ventilation Management: calming measures promoted  Head of Bed (HOB) Positioning: HOB at 60-90 degrees

## 2024-09-18 NOTE — ASSESSMENT & PLAN NOTE
-history of grade 1 diastolic dysfunction, preserved EF  -hypoxic overnight, O2 sats in the 80s require nasal cannula for improvement.  -of note, patient received a unit of packed red blood cells on 09/16, and also fluids postoperatively  -BNP elevated 062  -chest x-ray with right basilar opacity  -given IV lasix 20mg x 2 with improvement. Lasix PRN  -encourage IS   -wean O2 as tolerated  -repeat CXR on 09/18 with improvement

## 2024-09-18 NOTE — CONSULTS
Food & Nutrition  Education    Diet Education: fluid and salt   Time Spent: 15 minutes   Learners: pt       Nutrition Education provided with handouts: heart failure nutrition therapy       Comments: Touched on daily sodium recommendations, salt alternatives, and high sodium foods. Briefly touched on fluid restrictions.       All questions and concerns answered. Dietitian's contact information provided.       Follow-Up: 1x/weekly    Please Re-consult as needed        Thanks!

## 2024-09-18 NOTE — NURSING
[facility], [facility]  Nurses Note -- 4 Eyes      9/17/2024       Skin assessed on: Q Shift      [x] No Pressure Injuries Present    [x]Prevention Measures Documented    [] Yes LDA  for Pressure Injury Previously documented     [] Yes New Pressure Injury Discovered   [] LDA for New Pressure Injury Added    Left hip incision from surgery    Attending RN:  Megan Spain RN     Second RN:  Jacky WHELAN LPN

## 2024-09-18 NOTE — PT/OT/SLP PROGRESS
"Occupational Therapy   Treatment    Name: Marya Carranza  MRN: [id number]  Admitting Diagnosis:  Closed fracture of left hip  3 Days Post-Op    Recommendations:     Discharge Recommendations: Moderate Intensity Therapy  Discharge Equipment Recommendations:  to be determined by next level of care  Barriers to discharge:   (Pt is at high risk for falls and readmission if d/c home at this time.)    Assessment:     Marya Carranza is a 84 y.o. female with a medical diagnosis of Closed fracture of left hip.   Performance deficits affecting function are impaired endurance, weakness, impaired self care skills, impaired functional mobility, gait instability, impaired balance, decreased lower extremity function, decreased safety awareness, pain, decreased ROM, orthopedic precautions.     Pt willing to participate in tx session this date; pt w/ onset of dizziness and feeling lightheaded this date, with spO2 in lower-mid 80s on RA with exertion. Pt w/ functional progress as she was able to perform supine>sit w/ mod A to stand and complete functional transfers w/ min A and use of RW. Pt will continue to benefit from skilled acute OT services to maximize functional capacity for safe performance w/ ADLs and functional mobility.     Rehab Prognosis:  Good; patient would benefit from acute skilled OT services to address these deficits and reach maximum level of function.       Plan:     Patient to be seen  (3-5x/wk) to address the above listed problems via self-care/home management, therapeutic activities, therapeutic exercises  Plan of Care Expires: 09/30/24  Plan of Care Reviewed with: patient    Subjective     Chief Complaint: "I feel light headed."   Patient/Family Comments/goals: "I felt good sitting up."   Pain/Comfort:  Pain Rating 1:  (Pain w/ movement.)  Location - Side 1: Left  Location 1: hip  Pain Addressed 1: Reposition, Distraction, Cessation of Activity    Objective:     Communicated with: Nurse prior to session.  " "Patient found HOB elevated with pulse ox (continuous), telemetry, PureWick, bed alarm upon OT entry to room.    General Precautions: Standard, fall    Orthopedic Precautions:LLE weight bearing as tolerated  Braces: N/A  Respiratory Status: Nasal cannula, flow 3.5 L/min     Occupational Performance:     Bed Mobility:  Pt w/ complaints of feeling "lightheaded" while sitting EOB; spO2 ~92% on RA. Pt implementing PLB technique well w/ cueing and education.   Patient completed Scooting hips to EOB with minimum assistance  Patient completed Supine to Sit with minimum assistance and moderate assistance; pt required increased time for safe transition to EOB    Functional Mobility/Transfers:  Patient completed Sit <> Stand Transfer with minimum assistance and of 1 persons  with  rolling walker   Patient completed Bed <> Chair Transfer using Step Transfer technique with minimum assistance with rolling walker  Functional Mobility: Pt was able to ambulate a few feet away from bed w/ min A and use of RW; pt required v/t cueing/feedback for widening MARYANA and bearing weight through BUEs on RW for shifting weight to L for stepping w/ RLE. Chair positioned behind pt for stand>sit due to pt w/ worsening dizziness.     Activities of Daily Living:  Upper Body Dressing: minimum assistance for donning gown over back     [facility] 6 Click ADL: 15    Treatment & Education:  -Pt educated on role of OT and POC.   -Pt performed ADLs and functional mobility as noted above.   -Pt performed BUE AROM in all planes for 1 swt x 10 reps while sitting EOB.   -Questions and concerns addressed within scope.     Patient left up in chair with all lines intact and call button in reach    GOALS:   Multidisciplinary Problems       Occupational Therapy Goals          Problem: Occupational Therapy    Goal Priority Disciplines Outcome Interventions   Occupational Therapy Goal     OT, PT/OT Progressing    Description: Goals to be met by: 9/30/24     Patient will " increase functional independence with ADLs by performing:    LE Dressing with Minimal Assistance.  Grooming while seated with Modified [place].  Toileting from toilet with Minimal Assistance for hygiene and clothing management.   Step transfer with Minimal Assistance  Toilet transfer to bedside commode with Minimal Assistance.  Upper extremity exercise program x15 reps per handout, with independence.                         Time Tracking:     OT Date of Treatment: 09/18/24  OT Start Time: 0935  OT Stop Time: 1007  OT Total Time (min): 32 min    Billable Minutes:Therapeutic Activity 24  Therapeutic Exercise 8  Total Time 32 (co-tx w/ PT)     OT/JUANCARLOS: OT       5740-4720: (1 TA/ 8 min) - Required max A x 1 for stand pivot transfer back to bed; pt required max A for scooting to HOB in supine; total A required for sit>supine. HOB elevated w/ BLEs gloated on pillows. Call bell in reach w/ table at side.        9/18/2024

## 2024-09-18 NOTE — PLAN OF CARE
Problem: Adult Inpatient Plan of Care  Goal: Optimal Comfort and Wellbeing  Outcome: Progressing  Intervention: Provide Person-Centered Care  Flowsheets (Taken 9/18/2024 1822)  Trust Relationship/Rapport:   care explained   empathic listening provided   questions answered   thoughts/feelings acknowledged     Problem: Adult Inpatient Plan of Care  Goal: Absence of Hospital-Acquired Illness or Injury  Outcome: Met  Intervention: Prevent and Manage VTE (Venous Thromboembolism) Risk  Flowsheets (Taken 9/18/2024 1822)  VTE Prevention/Management:   ambulation promoted   remove, assess skin, and reapply sequential compression device   dorsiflexion/plantar flexion performed     Problem: Hip Arthroplasty  Goal: Absence of Bleeding  Outcome: Met  Intervention: Monitor and Manage Bleeding  Flowsheets (Taken 9/18/2024 1822)  Bleeding Management: dressing monitored

## 2024-09-18 NOTE — ASSESSMENT & PLAN NOTE
Patient with Hypoxic Respiratory failure which is Acute.  she is not on home oxygen. Supplemental oxygen was provided and noted-      .   Signs/symptoms of respiratory failure include- tachypnea and increased work of breathing. Contributing diagnoses includes - CHF Labs and images were reviewed. Patient Has recent ABG, which has been reviewed. Will treat underlying causes and adjust management of respiratory failure as follows-     -secondary to CHF.   -see plan as above for acute on chronic diastolic heart failure   -wean O2 as tolerated

## 2024-09-18 NOTE — NURSING
Per handoff received from Jacky WHELAN LPN. Patient care assumed. Patients overall condition assessed and patient appears to be in NAD with no non-verbal signs of pain. Patient with present rise and fall of chest noted. 20g LAC and 20g LW PIV's are clean, dry, and intact. Purewick in place. Call light in reach and patient instructed to inform the nurse if anything is needed. Patient stable and is continually being monitored.

## 2024-09-18 NOTE — SUBJECTIVE & OBJECTIVE
Interval History:  No acute overnight events.  Patient remained afebrile.  Continues to require supplemental oxygen, currently on 3 L this morning.  Denies any chest pain or shortness a breath though.  States she is feeling much better this morning, pain is better controlled.  Able to sleep well last night.      Review of Systems   Constitutional:  Positive for activity change and fatigue. Negative for chills and fever.   Respiratory:  Negative for cough, chest tightness and shortness of breath.    Cardiovascular:  Negative for chest pain.   Musculoskeletal:  Positive for arthralgias (improving) and gait problem. Negative for joint swelling.   Neurological:  Positive for weakness.   Psychiatric/Behavioral:  Negative for confusion and hallucinations.      Objective:     Vital Signs (Most Recent):  Temp: 98.3 °F (36.8 °C) (09/18/24 0731)  Pulse: 84 (09/18/24 0731)  Resp: 18 (09/18/24 0731)  BP: 133/61 (09/18/24 0731)  SpO2: (!) 92 % (09/18/24 1012) Vital Signs (24h Range):  Temp:  [97.4 °F (36.3 °C)-98.6 °F (37 °C)] 98.3 °F (36.8 °C)  Pulse:  [] 84  Resp:  [16-20] 18  SpO2:  [91 %-100 %] 92 %  BP: (133-147)/(61-67) 133/61     Weight: 58.1 kg (128 lb 1.4 oz)  Body mass index is 22.69 kg/m².    Intake/Output Summary (Last 24 hours) at 9/18/2024 1118  Last data filed at 9/18/2024 0957  Gross per 24 hour   Intake 920 ml   Output 800 ml   Net 120 ml         Physical Exam  Vitals and nursing note reviewed.   Constitutional:       General: She is not in acute distress.     Appearance: She is not ill-appearing.   HENT:      Nose: Nose normal.      Mouth/Throat:      Mouth: Mucous membranes are moist.   Eyes:      Extraocular Movements: Extraocular movements intact.   Cardiovascular:      Rate and Rhythm: Normal rate and regular rhythm.      Heart sounds: No murmur heard.  Pulmonary:      Effort: Pulmonary effort is normal. No respiratory distress.      Comments: On nasal cannula, RLL breath sounds improving.    Abdominal:      General: There is no distension.      Palpations: Abdomen is soft.   Musculoskeletal:         General: Tenderness (left hip pain) present. No swelling.      Right lower leg: No edema.      Left lower leg: No edema.      Comments: ROM limited due to pain. She appears to be neurovascular intact distally to left lower extremity.   Skin:     General: Skin is warm and dry.      Coloration: Skin is pale.      Comments: Incision/ dressing clean/dry/intact   Neurological:      General: No focal deficit present.      Mental Status: She is alert and oriented to person, place, and time.   Psychiatric:         Mood and Affect: Mood normal.         Thought Content: Thought content normal.             Significant Labs: All pertinent labs within the past 24 hours have been reviewed.    Significant Imaging: I have reviewed all pertinent imaging results/findings within the past 24 hours.

## 2024-09-18 NOTE — ASSESSMENT & PLAN NOTE
Anemia is likely due to acute blood loss which was from surgery (s/p left hip intramedullary nailing on 09/15)  and Iron deficiency. Most recent hemoglobin and hematocrit are listed below.  Recent Labs     09/16/24  2230 09/17/24  0443 09/18/24  0438   HGB 8.6* 8.4*  8.3* 7.7*   HCT 25.3* 26.0*  25.5* 22.8*       Plan  - Monitor serial CBC: Daily  - Transfuse PRBC if patient becomes hemodynamically unstable, symptomatic or H/H drops below 7/21.  - Patient has received 1 units of PRBCs on 09/16/2024  - Patient's anemia is currently stable  - monitor.

## 2024-09-18 NOTE — ASSESSMENT & PLAN NOTE
Chronic, controlled.   -resume home amlodipine   -BP uncontrolled, titrate home amlodipine. Increased to 10mg on 09/15  -blood pressure better controlled  -IV hydralazine PRN for SBP>180    Latest blood pressure and vitals reviewed-     Temp:  [97.4 °F (36.3 °C)-98.6 °F (37 °C)]   Pulse:  []   Resp:  [16-20]   BP: (133-147)/(61-67)   SpO2:  [91 %-100 %] .   Home meds for hypertension were reviewed and noted below.   Hypertension Medications               amLODIPine (NORVASC) 5 MG tablet Take 1 tablet (5 mg total) by mouth once daily.            While in the hospital, will manage blood pressure as follows; Continue home antihypertensive regimen    Will utilize p.r.n. blood pressure medication only if patient's blood pressure greater than 180/110 and she develops symptoms such as worsening chest pain or shortness of breath.

## 2024-09-18 NOTE — ASSESSMENT & PLAN NOTE
-presented with left hip pain after a mechanical fall (she tripped over her puppy).   -Left hip x-ray and CT left hip with roximal left femoral fracture.    -CT head with no acute intracranial abnormalities.    -Chest x-ray on admission  with no large focal consolidation.    -Orthopedic surgery was consulted:s/p  intramedullary nailing of the left hip on 09/15/2024.    -PT/OT consulted:  Recommends moderate intensity therapy

## 2024-09-18 NOTE — ASSESSMENT & PLAN NOTE
Patient's FSGs are controlled on current medication regimen.  Last A1c reviewed-   Lab Results   Component Value Date    HGBA1C 7.4 (H) 09/14/2024     Most recent fingerstick glucose reviewed-   Recent Labs   Lab 09/17/24  1206 09/17/24  1703 09/17/24  2343   POCTGLUCOSE 297* 309* 217*       Current correctional scale  Low  Maintain anti-hyperglycemic dose as follows-   Antihyperglycemics (From admission, onward)    Start     Stop Route Frequency Ordered    09/14/24 1608  insulin aspart U-100 pen 0-5 Units         -- SubQ Before meals & nightly PRN 09/14/24 1509        Hold Oral hypoglycemics while patient is in the hospital.  Repeat A1C- 7.4  Meds: SSI PRN to maintain goal 140-180  ADA diet when appropriate, accuchecks ACHS, hypoglycemic protocol

## 2024-09-18 NOTE — PLAN OF CARE
4:12pm: CM called patient's daughter, Marni Carranza, [phone], but did not receive an answer. Vm left    4:14pm: CM called patient's son, Merrill Carranza, [phone], but did not receive an answer. Vm left    4:17pm: CM called patient, [phone], but did not receive an answer     4:22pm:CM sent referrals for SNF via [facility] and uploaded PASRR and 142; CM to continue to assess for and until discharge

## 2024-09-18 NOTE — PLAN OF CARE
Problem: Occupational Therapy  Goal: Occupational Therapy Goal  Description: Goals to be met by: 9/30/24     Patient will increase functional independence with ADLs by performing:    LE Dressing with Minimal Assistance.  Grooming while seated with Modified [place].  Toileting from toilet with Minimal Assistance for hygiene and clothing management.   Step transfer with Minimal Assistance  Toilet transfer to bedside commode with Minimal Assistance.  Upper extremity exercise program x15 reps per handout, with independence.    Outcome: Progressing

## 2024-09-18 NOTE — ASSESSMENT & PLAN NOTE
Patient has Abnormal Magnesium: hypomagnesemia. Will continue to monitor electrolytes closely. Will replace the affected electrolytes and repeat labs to be done after interventions completed. The patient's magnesium results have been reviewed and are listed below.  Recent Labs   Lab 09/18/24  0438   MG 1.7      Continue to replace as needed

## 2024-09-18 NOTE — PT/OT/SLP PROGRESS
Physical Therapy Treatment    Patient Name:  Marya Carranza   MRN:  [id number]    Recommendations:     Discharge Recommendations: Moderate Intensity Therapy  Discharge Equipment Recommendations: to be determined by next level of care  Barriers to discharge: Decreased caregiver support    Assessment:     Marya Carranza is a 84 y.o. female admitted with a medical diagnosis of Closed fracture of left hip.  She presents with the following impairments/functional limitations: weakness, impaired endurance, impaired sensation, impaired self care skills, impaired balance, gait instability, impaired functional mobility, decreased ROM.    Pt was in less pain today, more alert, and required less assist overall. However, her c/o dizziness and 02 sats continues to compromise her ability to ambulate further distances both with and without 02.    Rehab Prognosis: Good; patient would benefit from acute skilled PT services to address these deficits and reach maximum level of function.    Recent Surgery: Procedure(s) (LRB):  INSERTION, INTRAMEDULLARY GONZALEZ, FEMUR (Left) 3 Days Post-Op    Plan:     During this hospitalization, patient to be seen BID to address the identified rehab impairments via therapeutic activities, therapeutic exercises, neuromuscular re-education, gait training and progress toward the following goals:    Plan of Care Expires:  09/30/24    Subjective     Chief Complaint: Pt states that she is dizzy and she feels that it is from her concussion that she sustained a few week ago.   Patient/Family Comments/goals: Improve walking  Pain/Comfort:  Pain Rating 1: 7/10  Location - Side 1: Left  Location 1: leg  Pain Addressed 1: Pre-medicate for activity, Distraction      Objective:     Communicated with nursing prior to session.  Patient found HOB elevated with telemetry, pulse ox (continuous), PureWick, bed alarm upon PT entry to room.     General Precautions: Standard, fall  Orthopedic Precautions: LLE weight bearing as  tolerated  Braces: N/A  Respiratory Status: Nasal cannula, flow 3.5 L/min     Functional Mobility:  Bed Mobility:     Rolling Left:  minimum assistance  Scooting: minimum assistance  Bridging: stand by assistance  Supine to Sit: moderate assistance and of 1 persons  Transfers:     Sit to Stand:  minimum assistance with rolling walker  Gait: 5 feet, RW, min A  Balance: Static Sit Good, Static Stand with RW Fair (-)      AM-PAC 6 CLICK MOBILITY  Turning over in bed (including adjusting bedclothes, sheets and blankets)?: 3  Sitting down on and standing up from a chair with arms (e.g., wheelchair, bedside commode, etc.): 3  Moving from lying on back to sitting on the side of the bed?: 3  Moving to and from a bed to a chair (including a wheelchair)?: 3  Need to walk in hospital room?: 3  Climbing 3-5 steps with a railing?: 2  Basic Mobility Total Score: 17       Treatment & Education:  TA 1:1 x 15 min to get pt from supine to L+ SL with sit with mod verbal cues for strategy, min A for placement of R+ hand on HR to assist with pulling herself up to sitting. Today, she was able to negotiate her L+ LE off EOB with little to no assist . Sit to stand with min A to RW, posterior lean requiring assist to maintain balance. Attempted ambulation, however pt only able to walk 5 feet before RW had to be pulled behind her due to dizziness. She continues to require moderate verbal cues for sequencing , as well as occasional assist with negotiating RW. PT would pause for longer than average periods in between each step. She exerted a lot of energy standing in between steps.    TE 1:1 x 8 min including AAROM heel slides, ankle pumps, supine Add/abduction, hip IR/ER 2 x 10 L+ only    Patient left up in chair with all lines intact, call button in reach, and nursing notified..    GOALS:   Multidisciplinary Problems       Physical Therapy Goals          Problem: Physical Therapy    Goal Priority Disciplines Outcome Goal Variances  Interventions   Physical Therapy Goal     PT, PT/OT Progressing     Description: Goals to be met by: 2024     Patient will increase functional independence with mobility by performin. Supine to sit with MInimal Assistance  2. Sit to supine with MInimal Assistance  3. Sit to stand transfer with Minimal Assistance  4. Bed to chair transfer with Minimal Assistance using Rolling Walker  5. Gait  x 100 feet with Minimal Assistance using Rolling Walker.                          Time Tracking:     PT Received On:    PT Start Time: 948     PT Stop Time: 1015  PT Total Time (min): 27 min     Billable Minutes: Therapeutic Activity 1 and Therapeutic Exercise 1    Treatment Type: Treatment  PT/PTA: PT     Number of PTA visits since last PT visit: 0     2024

## 2024-09-19 LAB
ANION GAP SERPL CALC-SCNC: 9 MMOL/L (ref 8–16)
BUN SERPL-MCNC: 12 MG/DL (ref 8–23)
CALCIUM SERPL-MCNC: 8.5 MG/DL (ref 8.7–10.5)
CHLORIDE SERPL-SCNC: 92 MMOL/L (ref 95–110)
CO2 SERPL-SCNC: 29 MMOL/L (ref 23–29)
CREAT SERPL-MCNC: 0.7 MG/DL (ref 0.5–1.4)
EST. GFR  (NO RACE VARIABLE): >60 ML/MIN/1.73 M^2
GLUCOSE SERPL-MCNC: 195 MG/DL (ref 70–110)
HCT VFR BLD AUTO: 23 % (ref 37–48.5)
HGB BLD-MCNC: 7.4 G/DL (ref 12–16)
IRON SERPL-MCNC: 25 UG/DL (ref 30–160)
MAGNESIUM SERPL-MCNC: 1.8 MG/DL (ref 1.6–2.6)
PHOSPHATE SERPL-MCNC: 3.6 MG/DL (ref 2.7–4.5)
POCT GLUCOSE: 249 MG/DL (ref 70–110)
POCT GLUCOSE: 259 MG/DL (ref 70–110)
POCT GLUCOSE: 290 MG/DL (ref 70–110)
POCT GLUCOSE: 291 MG/DL (ref 70–110)
POTASSIUM SERPL-SCNC: 4.5 MMOL/L (ref 3.5–5.1)
SARS-COV-2 RDRP RESP QL NAA+PROBE: NEGATIVE
SATURATED IRON: 7 % (ref 20–50)
SODIUM SERPL-SCNC: 130 MMOL/L (ref 136–145)
TOTAL IRON BINDING CAPACITY: 337 UG/DL (ref 250–450)
TRANSFERRIN SERPL-MCNC: 228 MG/DL (ref 200–375)

## 2024-09-19 PROCEDURE — 25000003 PHARM REV CODE 250: Performed by: STUDENT IN AN ORGANIZED HEALTH CARE EDUCATION/TRAINING PROGRAM

## 2024-09-19 PROCEDURE — 80048 BASIC METABOLIC PNL TOTAL CA: CPT | Performed by: STUDENT IN AN ORGANIZED HEALTH CARE EDUCATION/TRAINING PROGRAM

## 2024-09-19 PROCEDURE — 83735 ASSAY OF MAGNESIUM: CPT | Performed by: STUDENT IN AN ORGANIZED HEALTH CARE EDUCATION/TRAINING PROGRAM

## 2024-09-19 PROCEDURE — 94761 N-INVAS EAR/PLS OXIMETRY MLT: CPT

## 2024-09-19 PROCEDURE — 97116 GAIT TRAINING THERAPY: CPT

## 2024-09-19 PROCEDURE — 36415 COLL VENOUS BLD VENIPUNCTURE: CPT | Performed by: STUDENT IN AN ORGANIZED HEALTH CARE EDUCATION/TRAINING PROGRAM

## 2024-09-19 PROCEDURE — 97530 THERAPEUTIC ACTIVITIES: CPT

## 2024-09-19 PROCEDURE — U0002 COVID-19 LAB TEST NON-CDC: HCPCS | Performed by: STUDENT IN AN ORGANIZED HEALTH CARE EDUCATION/TRAINING PROGRAM

## 2024-09-19 PROCEDURE — 83540 ASSAY OF IRON: CPT | Performed by: STUDENT IN AN ORGANIZED HEALTH CARE EDUCATION/TRAINING PROGRAM

## 2024-09-19 PROCEDURE — 94799 UNLISTED PULMONARY SVC/PX: CPT

## 2024-09-19 PROCEDURE — 97535 SELF CARE MNGMENT TRAINING: CPT | Mod: CO

## 2024-09-19 PROCEDURE — 21400001 HC TELEMETRY ROOM

## 2024-09-19 PROCEDURE — 85014 HEMATOCRIT: CPT | Performed by: ORTHOPAEDIC SURGERY

## 2024-09-19 PROCEDURE — 25000003 PHARM REV CODE 250: Performed by: ORTHOPAEDIC SURGERY

## 2024-09-19 PROCEDURE — 85018 HEMOGLOBIN: CPT | Performed by: ORTHOPAEDIC SURGERY

## 2024-09-19 PROCEDURE — 30200315 PPD INTRADERMAL TEST REV CODE 302: Performed by: STUDENT IN AN ORGANIZED HEALTH CARE EDUCATION/TRAINING PROGRAM

## 2024-09-19 PROCEDURE — 86580 TB INTRADERMAL TEST: CPT | Performed by: STUDENT IN AN ORGANIZED HEALTH CARE EDUCATION/TRAINING PROGRAM

## 2024-09-19 PROCEDURE — 84100 ASSAY OF PHOSPHORUS: CPT | Performed by: STUDENT IN AN ORGANIZED HEALTH CARE EDUCATION/TRAINING PROGRAM

## 2024-09-19 PROCEDURE — 99900035 HC TECH TIME PER 15 MIN (STAT)

## 2024-09-19 PROCEDURE — 97530 THERAPEUTIC ACTIVITIES: CPT | Mod: CO

## 2024-09-19 RX ORDER — ENOXAPARIN SODIUM 100 MG/ML
40 INJECTION SUBCUTANEOUS EVERY 24 HOURS
Status: DISCONTINUED | OUTPATIENT
Start: 2024-09-20 | End: 2024-09-20 | Stop reason: HOSPADM

## 2024-09-19 RX ORDER — LANOLIN ALCOHOL/MO/W.PET/CERES
400 CREAM (GRAM) TOPICAL ONCE
Status: COMPLETED | OUTPATIENT
Start: 2024-09-19 | End: 2024-09-19

## 2024-09-19 RX ORDER — SYRING-NEEDL,DISP,INSUL,0.3 ML 29 G X1/2"
296 SYRINGE, EMPTY DISPOSABLE MISCELLANEOUS ONCE
Status: COMPLETED | OUTPATIENT
Start: 2024-09-19 | End: 2024-09-19

## 2024-09-19 RX ORDER — FAMOTIDINE 20 MG/1
20 TABLET, FILM COATED ORAL DAILY PRN
Status: DISCONTINUED | OUTPATIENT
Start: 2024-09-19 | End: 2024-09-20 | Stop reason: HOSPADM

## 2024-09-19 RX ORDER — BISACODYL 5 MG
10 TABLET, DELAYED RELEASE (ENTERIC COATED) ORAL DAILY PRN
Status: DISCONTINUED | OUTPATIENT
Start: 2024-09-19 | End: 2024-09-20 | Stop reason: HOSPADM

## 2024-09-19 RX ADMIN — AMLODIPINE BESYLATE 10 MG: 5 TABLET ORAL at 09:09

## 2024-09-19 RX ADMIN — HYDROCODONE BITARTRATE AND ACETAMINOPHEN 1 TABLET: 7.5; 325 TABLET ORAL at 05:09

## 2024-09-19 RX ADMIN — FAMOTIDINE 20 MG: 20 TABLET, FILM COATED ORAL at 12:09

## 2024-09-19 RX ADMIN — BISACODYL 10 MG: 5 TABLET, COATED ORAL at 10:09

## 2024-09-19 RX ADMIN — HYDROCODONE BITARTRATE AND ACETAMINOPHEN 1 TABLET: 7.5; 325 TABLET ORAL at 11:09

## 2024-09-19 RX ADMIN — INSULIN ASPART 1 UNITS: 100 INJECTION, SOLUTION INTRAVENOUS; SUBCUTANEOUS at 12:09

## 2024-09-19 RX ADMIN — PANTOPRAZOLE SODIUM 40 MG: 40 TABLET, DELAYED RELEASE ORAL at 09:09

## 2024-09-19 RX ADMIN — POLYETHYLENE GLYCOL 3350 17 G: 17 POWDER, FOR SOLUTION ORAL at 09:09

## 2024-09-19 RX ADMIN — Medication 400 MG: at 06:09

## 2024-09-19 RX ADMIN — INSULIN ASPART 3 UNITS: 100 INJECTION, SOLUTION INTRAVENOUS; SUBCUTANEOUS at 12:09

## 2024-09-19 RX ADMIN — MAGNESIUM CITRATE 296 ML: 1.75 LIQUID ORAL at 02:09

## 2024-09-19 RX ADMIN — DOCUSATE SODIUM 100 MG: 100 CAPSULE, LIQUID FILLED ORAL at 09:09

## 2024-09-19 RX ADMIN — DOCUSATE SODIUM 100 MG: 100 CAPSULE, LIQUID FILLED ORAL at 08:09

## 2024-09-19 RX ADMIN — INSULIN ASPART 3 UNITS: 100 INJECTION, SOLUTION INTRAVENOUS; SUBCUTANEOUS at 05:09

## 2024-09-19 RX ADMIN — PRAVASTATIN SODIUM 40 MG: 40 TABLET ORAL at 09:09

## 2024-09-19 RX ADMIN — TUBERCULIN PURIFIED PROTEIN DERIVATIVE 5 UNITS: 5 INJECTION, SOLUTION INTRADERMAL at 12:09

## 2024-09-19 NOTE — ASSESSMENT & PLAN NOTE
-presented with left hip pain after a mechanical fall (she tripped over her puppy).   -Left hip x-ray and CT left hip with roximal left femoral fracture.    -CT head with no acute intracranial abnormalities.    -Chest x-ray on admission  with no large focal consolidation.    -Orthopedic surgery was consulted:s/p  intramedullary nailing of the left hip on 09/15/2024.    -PT/OT consulted:  Recommends moderate intensity therapy  -medically stable, awaiting SNF placement

## 2024-09-19 NOTE — NURSING
TB skin test placed in Right inner forearm.     Patient tolerated well. Patient educated on need, risks, and benefits. Patient verbalized understanding.

## 2024-09-19 NOTE — ASSESSMENT & PLAN NOTE
Patient has Abnormal Phosphorus: hypophosphatemia. Will continue to monitor electrolytes closely. Will replace the affected electrolytes and repeat labs to be done after interventions completed. The patient's phosphorus results have been reviewed and are listed below.  Recent Labs   Lab 09/19/24  0428   PHOS 3.6        -replace as needed

## 2024-09-19 NOTE — ASSESSMENT & PLAN NOTE
-history of grade 1 diastolic dysfunction, preserved EF  -hypoxic overnight, O2 sats in the 80s require nasal cannula for improvement.  -of note, patient received a unit of packed red blood cells on 09/16, and also fluids postoperatively  -BNP elevated 1062  -chest x-ray with right basilar opacity  -given IV lasix 20mg x 2 with improvement. Lasix PRN  -encourage IS   -wean O2 as tolerated  -repeat CXR on 09/18 with improvement

## 2024-09-19 NOTE — PT/OT/SLP PROGRESS
Occupational Therapy      Patient Name:  Marya Carranza   MRN:  [id number]    OT tx attempted at 11:20, however, pt in significant pain  and has not received pain medication. Re-adjusted pt in chair for comfort and spoke with charge nurse who will give pain medication. OT to follow up after lunch as discussed with pt.     9/19/2024

## 2024-09-19 NOTE — PROGRESS NOTES
"[facility] Medicine  Progress Note    Patient Name: Marya Carranza  MRN: [id number]  Patient Class: IP- Inpatient   Admission Date: 9/14/2024  Length of Stay: 5 days  Attending Physician: Dick Ham DO  Primary Care Provider: Phillip Levy MD        Subjective:     Principal Problem:Closed fracture of left hip        HPI:   84 y.o. female, with a PMHx of DM, HTN, HLD presents ED via EMS with complaints of left hip pain after sustaining a fall today.  Stated that she tripped over her dog and fell forward landing on her left hip.  Denies any head injury, loss of consciousness, neck pain, numbness or tingling, or any new weaknesses.  Required help from her family to get back up, but unable to bear weight on her left hip due to the pain.  Denies any anticoagulation.Sister at bedside. Of note, she fell a few weeks ago because she was "dehydrated" and required staples in her head.     In the ED, patient was hypertensive with systolic blood pressure in the 190s.  No hypoxia.  Labs with normocytic anemia, appears to be near baseline.  Left hip x-ray and CT left hip with roximal left femoral fracture.  CT head with no acute intracranial abnormalities.  Chest x-ray with no large focal consolidation.  Orthopedic surgery was consulted in the ED (Dr. Greene spoke with Dr. Caal).  Admitted to hospital medicine for further evaluation and management    Overview/Hospital Course:  84 y.o. female admitted on 09/14/24 for acute proximal left femoral fracture.  Orthopedic surgery consulted-s/p intramedullary nailing of the left hip on 09/15/2024.  PT/OT consulted postoperatively-recommends moderate intensity there.  Also found to have hypomagnesemia, electrolytes replaced as needed. Hgb 6.6 on 09/16, transfused 1U pRBC oon 09/16.  Continue symptomatic management and supportive care.  Pain control.  Overnight on 09/16, patient with acute hypoxia with O2 sats in the 80s, requiring nasal cannula with " improvement.  History of diastolic heart failure.  BNP 1062.  Of note patient received fluids postoperatively and also unit of blood on 09/16, likely overloaded. CXR w/Right basilar opacity, which may be related to atelectasis or early infiltrate.  Given IV Lasix with improvement.  Encourage IS.  Wean O2 as tolerated.     Interval History:  No acute overnight events.  Patient remained afebrile.  Still on nasal cannula, but weaning down.  Patient is asymptomatic, denies any chest pain or shortness a breath.  Sitting up in bed, completed breakfast.  Appears much more comfortable this morning.  Denies any bleeding.  Hemoglobin trending down, but no need for transfusion at this time.  We will continue to monitor.  pain is better controlled.      Review of Systems   Constitutional:  Positive for activity change. Negative for chills, fatigue and fever.   Respiratory:  Negative for cough, chest tightness and shortness of breath.    Cardiovascular:  Negative for chest pain.   Musculoskeletal:  Positive for arthralgias (improving) and gait problem (Improving, cooperating with PT/OT). Negative for joint swelling.   Neurological:  Positive for weakness.   Psychiatric/Behavioral:  Negative for confusion and hallucinations.      Objective:     Vital Signs (Most Recent):  Temp: 98.7 °F (37.1 °C) (09/19/24 1148)  Pulse: 93 (09/19/24 1148)  Resp: 16 (09/19/24 1148)  BP: (!) 143/65 (09/19/24 1148)  SpO2: 95 % (09/19/24 1148) Vital Signs (24h Range):  Temp:  [98.4 °F (36.9 °C)-99 °F (37.2 °C)] 98.7 °F (37.1 °C)  Pulse:  [] 93  Resp:  [14-20] 16  SpO2:  [92 %-99 %] 95 %  BP: (113-143)/(56-69) 143/65     Weight: 58.1 kg (128 lb 1.4 oz)  Body mass index is 22.69 kg/m².    Intake/Output Summary (Last 24 hours) at 9/19/2024 1314  Last data filed at 9/19/2024 1241  Gross per 24 hour   Intake 840 ml   Output 1325 ml   Net -485 ml         Physical Exam  Vitals and nursing note reviewed.   Constitutional:       General: She is not in  acute distress.     Appearance: She is not ill-appearing.   HENT:      Nose: Nose normal.      Mouth/Throat:      Mouth: Mucous membranes are moist.   Eyes:      Extraocular Movements: Extraocular movements intact.   Cardiovascular:      Rate and Rhythm: Normal rate and regular rhythm.      Heart sounds: No murmur heard.  Pulmonary:      Effort: Pulmonary effort is normal. No respiratory distress.      Comments: On nasal cannula, RLL breath sounds improving.   Abdominal:      General: There is no distension.      Palpations: Abdomen is soft.   Musculoskeletal:         General: Tenderness (left hip pain) present. No swelling.      Right lower leg: No edema.      Left lower leg: No edema.      Comments: ROM limited due to pain. She appears to be neurovascular intact distally to left lower extremity.   Skin:     General: Skin is warm and dry.      Coloration: Skin is pale.      Comments: Incision/ dressing clean/dry/intact   Neurological:      General: No focal deficit present.      Mental Status: She is alert and oriented to person, place, and time.   Psychiatric:         Mood and Affect: Mood normal.         Thought Content: Thought content normal.             Significant Labs: All pertinent labs within the past 24 hours have been reviewed.    Significant Imaging: I have reviewed all pertinent imaging results/findings within the past 24 hours.    Assessment/Plan:      * Closed fracture of left hip  -presented with left hip pain after a mechanical fall (she tripped over her puppy).   -Left hip x-ray and CT left hip with roximal left femoral fracture.    -CT head with no acute intracranial abnormalities.    -Chest x-ray on admission  with no large focal consolidation.    -Orthopedic surgery was consulted:s/p  intramedullary nailing of the left hip on 09/15/2024.    -PT/OT consulted:  Recommends moderate intensity therapy  -medically stable, awaiting SNF placement    Acute on chronic diastolic heart failure  -history of  grade 1 diastolic dysfunction, preserved EF  -hypoxic overnight, O2 sats in the 80s require nasal cannula for improvement.  -of note, patient received a unit of packed red blood cells on 09/16, and also fluids postoperatively  -BNP elevated 1062  -chest x-ray with right basilar opacity  -given IV lasix 20mg x 2 with improvement. Lasix PRN  -encourage IS   -wean O2 as tolerated  -repeat CXR on 09/18 with improvement       Acute respiratory failure with hypoxia  Patient with Hypoxic Respiratory failure which is Acute.  she is not on home oxygen. Supplemental oxygen was provided and noted- Oxygen Concentration (%):  [2] 2    .   Signs/symptoms of respiratory failure include- tachypnea and increased work of breathing. Contributing diagnoses includes - CHF Labs and images were reviewed. Patient Has recent ABG, which has been reviewed. Will treat underlying causes and adjust management of respiratory failure as follows-     -secondary to CHF.   -see plan as above for acute on chronic diastolic heart failure   -wean O2 as tolerated    HLD (hyperlipidemia)  -resume home pravastatin      Anemia  Anemia is likely due to acute blood loss which was from surgery (s/p left hip intramedullary nailing on 09/15)  and Iron deficiency. Most recent hemoglobin and hematocrit are listed below.  Recent Labs     09/17/24  0443 09/18/24  0438 09/19/24  0428   HGB 8.4*  8.3* 7.7* 7.4*   HCT 26.0*  25.5* 22.8* 23.0*       Plan  - Monitor serial CBC: Daily  - Transfuse PRBC if patient becomes hemodynamically unstable, symptomatic or H/H drops below 7/21.  - Patient has received 1 units of PRBCs on 09/16/2024  - Patient's anemia is currently worsening. Will continue to trend. Iron profile ordered  - monitor.     Hypomagnesemia  Patient has Abnormal Magnesium: hypomagnesemia. Will continue to monitor electrolytes closely. Will replace the affected electrolytes and repeat labs to be done after interventions completed. The patient's magnesium  results have been reviewed and are listed below.  Recent Labs   Lab 09/19/24  0428   MG 1.8      Continue to replace as needed    Hypophosphatemia  Patient has Abnormal Phosphorus: hypophosphatemia. Will continue to monitor electrolytes closely. Will replace the affected electrolytes and repeat labs to be done after interventions completed. The patient's phosphorus results have been reviewed and are listed below.  Recent Labs   Lab 09/19/24  0428   PHOS 3.6        -replace as needed    Hypertension  Chronic, controlled.   -resume home amlodipine   -BP uncontrolled, titrate home amlodipine. Increased to 10mg on 09/15  -blood pressure better controlled  -IV hydralazine PRN for SBP>180    Latest blood pressure and vitals reviewed-     Temp:  [98.4 °F (36.9 °C)-99 °F (37.2 °C)]   Pulse:  []   Resp:  [14-20]   BP: (113-143)/(56-69)   SpO2:  [92 %-99 %] .   Home meds for hypertension were reviewed and noted below.   Hypertension Medications               amLODIPine (NORVASC) 5 MG tablet Take 1 tablet (5 mg total) by mouth once daily.            While in the hospital, will manage blood pressure as follows; Continue home antihypertensive regimen    Will utilize p.r.n. blood pressure medication only if patient's blood pressure greater than 180/110 and she develops symptoms such as worsening chest pain or shortness of breath.    Type 2 diabetes mellitus  Patient's FSGs are controlled on current medication regimen.  Last A1c reviewed-   Lab Results   Component Value Date    HGBA1C 7.4 (H) 09/14/2024     Most recent fingerstick glucose reviewed-   Recent Labs   Lab 09/18/24  2306 09/19/24  0722 09/19/24  1149   POCTGLUCOSE 241* 291* 290*       Current correctional scale  Low  Maintain anti-hyperglycemic dose as follows-   Antihyperglycemics (From admission, onward)      Start     Stop Route Frequency Ordered    09/14/24 1608  insulin aspart U-100 pen 0-5 Units         -- SubQ Before meals & nightly PRN 09/14/24 7849           Hold Oral hypoglycemics while patient is in the hospital.  Repeat A1C- 7.4  Meds: SSI PRN to maintain goal 140-180  ADA diet when appropriate, accuchecks ACHS, hypoglycemic protocol      Hyponatremia  Hyponatremia is likely due to Heart Failure. The patient's most recent sodium results are listed below.  Recent Labs     09/17/24  0443 09/18/24  0438 09/19/24  0428   *  134* 129* 130*     Plan  - Correct the sodium by 4-6mEq in 24 hours.   - Will treat the hyponatremia with Fluid restriction of:  1.5 liter per day  - Monitor sodium Daily.   - Patient hyponatremia is improving    Advanced care planning/counseling discussion  Advance Care Planning    Date: 09/14/2024    Code Status  I engaged the the patient in a voluntary conversation about the patient's preferences for care  at the very end of life. The patient wishes to have CPR or other invasive treatments performed when her heart and/or breathing stops. I communicated to the patient that her wishes align with full code status and she agrees. Daughter at bedside and present for discussion.     A total of 16 min was spent on advance care planning, goals of care discussion, emotional support, formulating and communicating prognosis and exploring burden/benefit of various approaches of treatment. This discussion occurred on a fully voluntary basis with the verbal consent of the patient and/or family.             VTE Risk Mitigation (From admission, onward)           Ordered     enoxaparin injection 40 mg  Daily         09/19/24 1316     IP VTE HIGH RISK PATIENT  Once         09/14/24 1509     Place sequential compression device  Until discontinued         09/14/24 1509                    Discharge Planning   OSCAR:      Code Status: Full Code   Is the patient medically ready for discharge?:     Reason for patient still in hospital (select all that apply): Patient trending condition, Treatment, Consult recommendations, PT / OT recommendations, and Pending  disposition  Discharge Plan A: Skilled Nursing Facility   Discharge Delays: None known at this time              Dick Ham DO  Department of Hospital Medicine   [facility] S[facility]

## 2024-09-19 NOTE — PLAN OF CARE
09/19/24 1143   Medicare Message   Important Message from Medicare regarding Discharge Appeal Rights Given to patient/caregiver;Explained to patient/caregiver;Signed/date by patient/caregiver   Date IMM was signed 09/19/24   Time IMM was signed 0938

## 2024-09-19 NOTE — PT/OT/SLP PROGRESS
Occupational Therapy   Treatment    Name: Marya Carranza  MRN: [id number]  Admitting Diagnosis:  Closed fracture of left hip  4 Days Post-Op    Recommendations:     Discharge Recommendations: Moderate Intensity Therapy  Discharge Equipment Recommendations:  to be determined by next level of care  Barriers to discharge: pt not at PLOF      Assessment:     Marya Carranza is a 84 y.o. female with a medical diagnosis of Closed fracture of left hip.  She presents with the following performance deficits affecting function are weakness, gait instability, decreased upper extremity function, decreased ROM, decreased lower extremity function, impaired balance, impaired endurance, decreased safety awareness, impaired self care skills, impaired functional mobility, decreased coordination, pain, impaired skin, orthopedic precautions, edema.     Rehab Prognosis:  Good; patient would benefit from acute skilled OT services to address these deficits and reach maximum level of function.       Plan:     Patient to be seen  (3-5x/wk) to address the above listed problems via self-care/home management, therapeutic activities, therapeutic exercises  Plan of Care Expires: 09/30/24  Plan of Care Reviewed with: patient    Subjective     Chief Complaint: pain  Patient/Family Comments/goals: Pt agreeable to therapy.  Pain/Comfort:  Pain Rating 1: 10/10  Location - Side 1: Left  Location 1: leg  Pain Addressed 1: Pre-medicate for activity, Distraction, Cessation of Activity, Nurse notified, Reposition    Objective:     Communicated with: Nurse prior to session.  Patient found up in chair with telemetry, pulse ox (continuous), PureWick, oxygen upon OT entry to room.    General Precautions: Standard, fall    Orthopedic Precautions:LLE weight bearing as tolerated  Braces: N/A  Respiratory Status: Nasal cannula, flow 3 L/min     Occupational Performance:     Bed Mobility:    Patient completed Rolling/Turning to Right with stand by assistance  Patient  completed Scooting laterally along EOB with SBA and Bridging with stand by assistance  Patient completed Sit to Supine with moderate assistance     Functional Mobility/Transfers:  Patient completed Sit <> Stand Transfer with maximal assistance  with  rolling walker from bedside chair and from BSC ( pt LLE internally rotates and requires assistance for positioning).  Patient completed BSC Transfer Step Transfer technique with moderate assistance with  rolling walker  Functional Mobility: Mod A, RW  for a few steps chair>BSC and then BSC>bed. Pt has difficulty with weight shifting (due to pain) in order to advance RLE and also requires manual assist to advance LLE. Pt c/o intermittent dizziness, but subsided with seated rest.     Activities of Daily Living:  Toileting: stand by assistance for seated pericare on BSC    AMPAC 6 Click ADL: 16    Treatment & Education:  Bed mobility, functional transfer/mobility , and ADL completed as noted above.   Pt educated on safety awareness with all OOB mobility and ADL .   Educated pt on safety with BSC transfers and sit<>stand  Static standing balance with BUE support: MIN A    Patient left HOB elevated with all lines intact, call button in reach, bed alarm on, and nurse notified; heels offloaded. Pt refused SCDs/FCDs and offloading of L hip. Nurse aware.    GOALS:   Multidisciplinary Problems       Occupational Therapy Goals          Problem: Occupational Therapy    Goal Priority Disciplines Outcome Interventions   Occupational Therapy Goal     OT, PT/OT Progressing    Description: Goals to be met by: 9/30/24     Patient will increase functional independence with ADLs by performing:    LE Dressing with Minimal Assistance.  Grooming while seated with Modified [place].  Toileting from toilet with Minimal Assistance for hygiene and clothing management.   Step transfer with Minimal Assistance  Toilet transfer to bedside commode with Minimal Assistance.  Upper extremity exercise  program x15 reps per handout, with independence.                         Time Tracking:     OT Date of Treatment: 09/19/24  OT Start Time: 1254  OT Stop Time: 1332  OT Total Time (min): 38 min    Billable Minutes:Self Care/Home Management 23  Therapeutic Activity 15    OT/JUANCARLOS: JUANCARLOS     Number of JUANCARLOS visits since last OT visit: 1    9/19/2024

## 2024-09-19 NOTE — PT/OT/SLP PROGRESS
Physical Therapy Treatment    Patient Name:  Marya Carranza   MRN:  [id number]    Recommendations:     Discharge Recommendations: Moderate Intensity Therapy  Discharge Equipment Recommendations: to be determined by next level of care  Barriers to discharge: Decreased caregiver support    Assessment:     Marya Carranza is a 84 y.o. female admitted with a medical diagnosis of Closed fracture of left hip.  She presents with the following impairments/functional limitations: weakness, impaired endurance, impaired sensation, impaired self care skills, impaired balance, gait instability, impaired functional mobility, decreased ROM.    Pt able to sit EOB x 10 min while taking meds from pharmacy and nursing without use of 02 in order to start weaning pt from NC. Had pt perform deep breathing as well as using spirometer to improve lung capacity. 02 sats did not drop below 88% while on room air. However, for ambulation, pt required 4L02 and still c/o dizziness requiring long standing RB in between each step.     Pt did ambulate further than previous days, however she remains unable to advance L+ LE independently and mod to max assist for weight shifting onto L+ LE to step with R+    Rehab Prognosis: Good; patient would benefit from acute skilled PT services to address these deficits and reach maximum level of function.    Recent Surgery: Procedure(s) (LRB):  INSERTION, INTRAMEDULLARY GONZALEZ, FEMUR (Left) 4 Days Post-Op    Plan:     During this hospitalization, patient to be seen BID to address the identified rehab impairments via therapeutic activities, therapeutic exercises, neuromuscular re-education, gait training and progress toward the following goals:    Plan of Care Expires:  09/30/24    Subjective     Chief Complaint: Pt states that she does not understand why she continues to get dizzy when walking.   Patient/Family Comments/goals: Improve walking  Pain/Comfort:  Pain Rating 1: 6/10  Location - Side 1: Left  Location 1:  leg  Pain Addressed 1: Pre-medicate for activity, Distraction, Reposition      Objective:     Communicated with nursing prior to session.  Patient found HOB elevated with telemetry, pulse ox (continuous), PureWick, bed alarm upon PT entry to room.     General Precautions: Standard, fall  Orthopedic Precautions: LLE weight bearing as tolerated  Braces: N/A  Respiratory Status: Nasal cannula, flow 3.5 L/min     Functional Mobility:  Bed Mobility:     Rolling Left:  minimum assistance  Scooting: minimum assistance  Bridging: stand by assistance  Supine to Sit: min assistance and of 1 persons  Transfers:     Sit to Stand:  minimum assistance with rolling walker from bed ; from chair moderate assist to RW  Gait: 10 feet, RW, mod A - see assessment for details   Balance: Static Sit Good, Static Stand with RW Fair (-)      AM-PAC 6 CLICK MOBILITY  Turning over in bed (including adjusting bedclothes, sheets and blankets)?: 3  Sitting down on and standing up from a chair with arms (e.g., wheelchair, bedside commode, etc.): 3  Moving from lying on back to sitting on the side of the bed?: 3  Moving to and from a bed to a chair (including a wheelchair)?: 3  Need to walk in hospital room?: 2  Climbing 3-5 steps with a railing?: 1  Basic Mobility Total Score: 15       Treatment & Education:  TA 1:1 x 30 min to get pt from supine to L+ SL with sit with mod verbal cues for strategy, min A for placement of R+ hand on HR to assist with pulling herself up to sitting. Today, she was able to negotiate her L+ LE off EOB with little to no assist . Sit to stand with min A to RW, posterior lean requiring assist to maintain balance. Sat EOB for 02 weaning. Pt with max A required with sheet in order to reposition in recliner.   GT 1:1 x 8 min recliner had to be pulled behind her due to dizziness.    Patient left up in chair with all lines intact, call button in reach, and nursing notified..    GOALS:   Multidisciplinary Problems        Physical Therapy Goals          Problem: Physical Therapy    Goal Priority Disciplines Outcome Goal Variances Interventions   Physical Therapy Goal     PT, PT/OT Progressing     Description: Goals to be met by: 2024     Patient will increase functional independence with mobility by performin. Supine to sit with MInimal Assistance  2. Sit to supine with MInimal Assistance  3. Sit to stand transfer with Minimal Assistance  4. Bed to chair transfer with Minimal Assistance using Rolling Walker  5. Gait  x 100 feet with Minimal Assistance using Rolling Walker.                          Time Tracking:     PT Received On:    PT Start Time: 955     PT Stop Time:   PT Total Time (min): 40 min     Billable Minutes: Therapeutic Activity 2 and Gait Training 1    Treatment Type: Treatment  PT/PTA: PT     Number of PTA visits since last PT visit: 0     2024

## 2024-09-19 NOTE — ASSESSMENT & PLAN NOTE
Patient has Abnormal Magnesium: hypomagnesemia. Will continue to monitor electrolytes closely. Will replace the affected electrolytes and repeat labs to be done after interventions completed. The patient's magnesium results have been reviewed and are listed below.  Recent Labs   Lab 09/19/24  0428   MG 1.8      Continue to replace as needed

## 2024-09-19 NOTE — PLAN OF CARE
9:35am: Spoke with patient at bedside regarding next steps of care.  Patient stated she is open to SNF. I provided the patient a choice of post acute providers and offered a list of CMS rated SNFs.  Patient has declined to select a preferred provider and elects placement with the first accepting in network provider that is available to provide services as ordered by the physician.  Patient clinically accepted to [facility]: [phone] and [facility]: [phone].  Discussed with patient accepting facilities; patient stated she would rather go to [facility] since it is closer to her home.  CM to follow up with facility to submit for insurance auth.     Patient to discharge to [facility].  Per attending patient is medically stable for discharge, however patient has not had a BM since admitting on 9/14.  Per attending, patient has been given Ducolaz and can discharge after BM.  CM uploaded required documents to [facility], including MAR, labs, PPD, Covid results, CXR, PASRR, 142, and updated clinicals.  Discharge summary and plan of care orders will be uploaded when patient is able to discharge to [facility].  [facility] to submit for auth today; CM spoke with [facility] regarding placement for patient.

## 2024-09-19 NOTE — NURSING
Pt resting in bed, dressing to left hip intact.  Tele box monitored.  Pt on 3L NC, tolerating well.  Diet tolerated well.  Pt on 1500ml fluid restrictions.  No acute distress noted, pt free from falls or injury.  Bed in low position, wheels locked, bed alarm on, call light in reach for assistance, will continue to monitor.    [facility] [place], [place]  Nurses Note -- 4 Eyes      9/18/2024       Skin assessed on: Q Shift      [x] No Pressure Injuries Present    [x]Prevention Measures Documented    [] Yes LDA  for Pressure Injury Previously documented     [] Yes New Pressure Injury Discovered   [] LDA for New Pressure Injury Added      Attending RN:  Sally Sepulveda RN     Second RN:  HARSHAD Dinh

## 2024-09-19 NOTE — PLAN OF CARE
Problem: Adult Inpatient Plan of Care  Goal: Plan of Care Review  Outcome: Progressing     Problem: Adult Inpatient Plan of Care  Goal: Patient-Specific Goal (Individualized)  Outcome: Progressing     Problem: Adult Inpatient Plan of Care  Goal: Optimal Comfort and Wellbeing  Outcome: Progressing     Problem: Pain Acute  Goal: Optimal Pain Control and Function  Outcome: Progressing     Problem: Hip Arthroplasty  Goal: Optimal Coping  Outcome: Progressing     Problem: Hip Arthroplasty  Goal: Effective Bowel Elimination  Outcome: Progressing     Problem: Hip Arthroplasty  Goal: Acceptable Pain Control  Outcome: Progressing     Problem: Hip Arthroplasty  Goal: Effective Oxygenation and Ventilation  Outcome: Progressing

## 2024-09-19 NOTE — ASSESSMENT & PLAN NOTE
Patient with Hypoxic Respiratory failure which is Acute.  she is not on home oxygen. Supplemental oxygen was provided and noted- Oxygen Concentration (%):  [2] 2    .   Signs/symptoms of respiratory failure include- tachypnea and increased work of breathing. Contributing diagnoses includes - CHF Labs and images were reviewed. Patient Has recent ABG, which has been reviewed. Will treat underlying causes and adjust management of respiratory failure as follows-     -secondary to CHF.   -see plan as above for acute on chronic diastolic heart failure   -wean O2 as tolerated

## 2024-09-19 NOTE — PROGRESS NOTES
Ortho Daily Progress Note    Marya Carranza is a 84 y.o. female admitted on 9/14/2024      Chief Complaint/Reason for admission: Fall (Pt reports to ED via E[facility] following a trip and fall over dog, complaining og left hip pain. No shortening or rotation. Pain on movement and palpation, denies LOC or hitting head)       Hospital Day: 5  Post Op Day: 4 Days Post-Op     The patient was seen and examined this morning at the bedside. Patient reports no acute issues overnight.  Patient reports that pain is adequately controlled.    _______________    Vitals:    09/19/24 0832 09/19/24 1120 09/19/24 1132 09/19/24 1148   BP:    (!) 143/65   Pulse: 101  94 93   Resp:  18  16   Temp:    98.7 °F (37.1 °C)   TempSrc:    Oral   SpO2:    95%   Weight:       Height:           Vital Signs (Most Recent)  Temp: 98.7 °F (37.1 °C) (09/19/24 1148)  Pulse: 93 (09/19/24 1148)  Resp: 16 (09/19/24 1148)  BP: (!) 143/65 (09/19/24 1148)  SpO2: 95 % (09/19/24 1148)    Vital Signs Range (Last 24H):  Temp:  [98.4 °F (36.9 °C)-99 °F (37.2 °C)]   Pulse:  []   Resp:  [14-20]   BP: (113-143)/(56-69)   SpO2:  [92 %-99 %]       Physical:    AAOx3  Incision/ dressing clean/dry/intact  NVI Distally  Palpable distal pulses  CR<3sec      Recent Labs     09/17/24  0443 09/18/24  0438 09/19/24  0428   K 3.7  3.8 4.1 4.5   MG 1.5* 1.7 1.8   PHOS 2.4*  --  3.6   CALCIUM 8.5*  8.7 8.2* 8.5*   WBC 10.53  --   --    HGB 8.4*  8.3* 7.7* 7.4*   HCT 26.0*  25.5* 22.8* 23.0*     --   --        I/O last 3 completed shifts:  In: 1160 [P.O.:1160]  Out: 1175 [Urine:1175]          Assessment:  A/P   Status post left hip intramedullary nailing              Plan:    PT/OT:  weight-bearing as tolerated  Pain Control  DVT Prophylaxis:     Discharge Plan:  likely skilled nursing.    The patient does continued to suffer from anemia.  It is likely not from the surgical intervention and left leg.   She appears to be asymptomatic however she continues to require  blood transfusions in likely if she would be further investigated for other areas of blood loss and anemia.        Jose Alberto Caal MD  [facility]

## 2024-09-19 NOTE — ASSESSMENT & PLAN NOTE
Anemia is likely due to acute blood loss which was from surgery (s/p left hip intramedullary nailing on 09/15)  and Iron deficiency. Most recent hemoglobin and hematocrit are listed below.  Recent Labs     09/17/24  0443 09/18/24  0438 09/19/24  0428   HGB 8.4*  8.3* 7.7* 7.4*   HCT 26.0*  25.5* 22.8* 23.0*       Plan  - Monitor serial CBC: Daily  - Transfuse PRBC if patient becomes hemodynamically unstable, symptomatic or H/H drops below 7/21.  - Patient has received 1 units of PRBCs on 09/16/2024  - Patient's anemia is currently worsening. Will continue to trend. Iron profile ordered  - monitor.

## 2024-09-19 NOTE — ASSESSMENT & PLAN NOTE
Hyponatremia is likely due to Heart Failure. The patient's most recent sodium results are listed below.  Recent Labs     09/17/24  0443 09/18/24  0438 09/19/24  0428   *  134* 129* 130*     Plan  - Correct the sodium by 4-6mEq in 24 hours.   - Will treat the hyponatremia with Fluid restriction of:  1.5 liter per day  - Monitor sodium Daily.   - Patient hyponatremia is improving

## 2024-09-19 NOTE — ASSESSMENT & PLAN NOTE
Chronic, controlled.   -resume home amlodipine   -BP uncontrolled, titrate home amlodipine. Increased to 10mg on 09/15  -blood pressure better controlled  -IV hydralazine PRN for SBP>180    Latest blood pressure and vitals reviewed-     Temp:  [98.4 °F (36.9 °C)-99 °F (37.2 °C)]   Pulse:  []   Resp:  [14-20]   BP: (113-143)/(56-69)   SpO2:  [92 %-99 %] .   Home meds for hypertension were reviewed and noted below.   Hypertension Medications               amLODIPine (NORVASC) 5 MG tablet Take 1 tablet (5 mg total) by mouth once daily.            While in the hospital, will manage blood pressure as follows; Continue home antihypertensive regimen    Will utilize p.r.n. blood pressure medication only if patient's blood pressure greater than 180/110 and she develops symptoms such as worsening chest pain or shortness of breath.

## 2024-09-19 NOTE — PLAN OF CARE
Problem: Adult Inpatient Plan of Care  Goal: Plan of Care Review  Outcome: Progressing  Flowsheets (Taken 9/19/2024 0925)  Plan of Care Reviewed With: patient  Goal: Optimal Comfort and Wellbeing  Outcome: Progressing  Intervention: Monitor Pain and Promote Comfort  Flowsheets (Taken 9/19/2024 0925)  Pain Management Interventions:   around-the-clock dosing utilized   care clustered   relaxation techniques promoted   quiet environment facilitated  Intervention: Provide Person-Centered Care  Flowsheets (Taken 9/19/2024 0925)  Trust Relationship/Rapport:   care explained   choices provided   questions answered   questions encouraged   thoughts/feelings acknowledged     Problem: Pain Acute  Goal: Optimal Pain Control and Function  Outcome: Progressing  Intervention: Develop Pain Management Plan  Flowsheets (Taken 9/19/2024 0925)  Pain Management Interventions:   around-the-clock dosing utilized   care clustered   relaxation techniques promoted   quiet environment facilitated     Problem: Hip Arthroplasty  Goal: Effective Oxygenation and Ventilation  Outcome: Progressing  Intervention: Optimize Oxygenation and Ventilation  Flowsheets (Taken 9/19/2024 0925)  Airway/Ventilation Management:   airway patency maintained   pulmonary hygiene promoted   calming measures promoted  Head of Bed (HOB) Positioning: HOB at 45 degrees

## 2024-09-19 NOTE — ASSESSMENT & PLAN NOTE
Patient's FSGs are controlled on current medication regimen.  Last A1c reviewed-   Lab Results   Component Value Date    HGBA1C 7.4 (H) 09/14/2024     Most recent fingerstick glucose reviewed-   Recent Labs   Lab 09/18/24  2306 09/19/24  0722 09/19/24  1149   POCTGLUCOSE 241* 291* 290*       Current correctional scale  Low  Maintain anti-hyperglycemic dose as follows-   Antihyperglycemics (From admission, onward)    Start     Stop Route Frequency Ordered    09/14/24 1608  insulin aspart U-100 pen 0-5 Units         -- SubQ Before meals & nightly PRN 09/14/24 1509        Hold Oral hypoglycemics while patient is in the hospital.  Repeat A1C- 7.4  Meds: SSI PRN to maintain goal 140-180  ADA diet when appropriate, accuchecks ACHS, hypoglycemic protocol

## 2024-09-19 NOTE — SUBJECTIVE & OBJECTIVE
Interval History:  No acute overnight events.  Patient remained afebrile.  Still on nasal cannula, but weaning down.  Patient is asymptomatic, denies any chest pain or shortness a breath.  Sitting up in bed, completed breakfast.  Appears much more comfortable this morning.  Denies any bleeding.  Hemoglobin trending down, but no need for transfusion at this time.  We will continue to monitor.  pain is better controlled.      Review of Systems   Constitutional:  Positive for activity change. Negative for chills, fatigue and fever.   Respiratory:  Negative for cough, chest tightness and shortness of breath.    Cardiovascular:  Negative for chest pain.   Musculoskeletal:  Positive for arthralgias (improving) and gait problem (Improving, cooperating with PT/OT). Negative for joint swelling.   Neurological:  Positive for weakness.   Psychiatric/Behavioral:  Negative for confusion and hallucinations.      Objective:     Vital Signs (Most Recent):  Temp: 98.7 °F (37.1 °C) (09/19/24 1148)  Pulse: 93 (09/19/24 1148)  Resp: 16 (09/19/24 1148)  BP: (!) 143/65 (09/19/24 1148)  SpO2: 95 % (09/19/24 1148) Vital Signs (24h Range):  Temp:  [98.4 °F (36.9 °C)-99 °F (37.2 °C)] 98.7 °F (37.1 °C)  Pulse:  [] 93  Resp:  [14-20] 16  SpO2:  [92 %-99 %] 95 %  BP: (113-143)/(56-69) 143/65     Weight: 58.1 kg (128 lb 1.4 oz)  Body mass index is 22.69 kg/m².    Intake/Output Summary (Last 24 hours) at 9/19/2024 1314  Last data filed at 9/19/2024 1241  Gross per 24 hour   Intake 840 ml   Output 1325 ml   Net -485 ml         Physical Exam  Vitals and nursing note reviewed.   Constitutional:       General: She is not in acute distress.     Appearance: She is not ill-appearing.   HENT:      Nose: Nose normal.      Mouth/Throat:      Mouth: Mucous membranes are moist.   Eyes:      Extraocular Movements: Extraocular movements intact.   Cardiovascular:      Rate and Rhythm: Normal rate and regular rhythm.      Heart sounds: No murmur  heard.  Pulmonary:      Effort: Pulmonary effort is normal. No respiratory distress.      Comments: On nasal cannula, RLL breath sounds improving.   Abdominal:      General: There is no distension.      Palpations: Abdomen is soft.   Musculoskeletal:         General: Tenderness (left hip pain) present. No swelling.      Right lower leg: No edema.      Left lower leg: No edema.      Comments: ROM limited due to pain. She appears to be neurovascular intact distally to left lower extremity.   Skin:     General: Skin is warm and dry.      Coloration: Skin is pale.      Comments: Incision/ dressing clean/dry/intact   Neurological:      General: No focal deficit present.      Mental Status: She is alert and oriented to person, place, and time.   Psychiatric:         Mood and Affect: Mood normal.         Thought Content: Thought content normal.             Significant Labs: All pertinent labs within the past 24 hours have been reviewed.    Significant Imaging: I have reviewed all pertinent imaging results/findings within the past 24 hours.

## 2024-09-20 VITALS
DIASTOLIC BLOOD PRESSURE: 67 MMHG | OXYGEN SATURATION: 93 % | WEIGHT: 128.06 LBS | HEIGHT: 63 IN | HEART RATE: 95 BPM | BODY MASS INDEX: 22.69 KG/M2 | TEMPERATURE: 99 F | RESPIRATION RATE: 18 BRPM | SYSTOLIC BLOOD PRESSURE: 130 MMHG

## 2024-09-20 LAB
ANION GAP SERPL CALC-SCNC: 11 MMOL/L (ref 8–16)
BASOPHILS # BLD AUTO: 0.03 K/UL (ref 0–0.2)
BASOPHILS NFR BLD: 0.5 % (ref 0–1.9)
BUN SERPL-MCNC: 10 MG/DL (ref 8–23)
CALCIUM SERPL-MCNC: 8.8 MG/DL (ref 8.7–10.5)
CHLORIDE SERPL-SCNC: 93 MMOL/L (ref 95–110)
CO2 SERPL-SCNC: 29 MMOL/L (ref 23–29)
CREAT SERPL-MCNC: 0.6 MG/DL (ref 0.5–1.4)
DIFFERENTIAL METHOD BLD: ABNORMAL
EOSINOPHIL # BLD AUTO: 0.1 K/UL (ref 0–0.5)
EOSINOPHIL NFR BLD: 1.7 % (ref 0–8)
ERYTHROCYTE [DISTWIDTH] IN BLOOD BY AUTOMATED COUNT: 14.4 % (ref 11.5–14.5)
EST. GFR  (NO RACE VARIABLE): >60 ML/MIN/1.73 M^2
GLUCOSE SERPL-MCNC: 187 MG/DL (ref 70–110)
HCT VFR BLD AUTO: 26.5 % (ref 37–48.5)
HCT VFR BLD AUTO: 26.5 % (ref 37–48.5)
HGB BLD-MCNC: 8.6 G/DL (ref 12–16)
HGB BLD-MCNC: 8.6 G/DL (ref 12–16)
IMM GRANULOCYTES # BLD AUTO: 0.04 K/UL (ref 0–0.04)
IMM GRANULOCYTES NFR BLD AUTO: 0.6 % (ref 0–0.5)
LYMPHOCYTES # BLD AUTO: 0.8 K/UL (ref 1–4.8)
LYMPHOCYTES NFR BLD: 12.1 % (ref 18–48)
MAGNESIUM SERPL-MCNC: 2 MG/DL (ref 1.6–2.6)
MCH RBC QN AUTO: 29.7 PG (ref 27–31)
MCHC RBC AUTO-ENTMCNC: 32.5 G/DL (ref 32–36)
MCV RBC AUTO: 91 FL (ref 82–98)
MONOCYTES # BLD AUTO: 1.1 K/UL (ref 0.3–1)
MONOCYTES NFR BLD: 16.9 % (ref 4–15)
NEUTROPHILS # BLD AUTO: 4.4 K/UL (ref 1.8–7.7)
NEUTROPHILS NFR BLD: 68.2 % (ref 38–73)
NRBC BLD-RTO: 0 /100 WBC
PHOSPHATE SERPL-MCNC: 3.3 MG/DL (ref 2.7–4.5)
PLATELET # BLD AUTO: 265 K/UL (ref 150–450)
PMV BLD AUTO: 10.9 FL (ref 9.2–12.9)
POCT GLUCOSE: 163 MG/DL (ref 70–110)
POCT GLUCOSE: 262 MG/DL (ref 70–110)
POTASSIUM SERPL-SCNC: 4.3 MMOL/L (ref 3.5–5.1)
RBC # BLD AUTO: 2.9 M/UL (ref 4–5.4)
SODIUM SERPL-SCNC: 133 MMOL/L (ref 136–145)
WBC # BLD AUTO: 6.45 K/UL (ref 3.9–12.7)

## 2024-09-20 PROCEDURE — 83735 ASSAY OF MAGNESIUM: CPT | Performed by: STUDENT IN AN ORGANIZED HEALTH CARE EDUCATION/TRAINING PROGRAM

## 2024-09-20 PROCEDURE — 25000003 PHARM REV CODE 250: Performed by: ORTHOPAEDIC SURGERY

## 2024-09-20 PROCEDURE — 63600175 PHARM REV CODE 636 W HCPCS: Performed by: STUDENT IN AN ORGANIZED HEALTH CARE EDUCATION/TRAINING PROGRAM

## 2024-09-20 PROCEDURE — 84100 ASSAY OF PHOSPHORUS: CPT | Performed by: STUDENT IN AN ORGANIZED HEALTH CARE EDUCATION/TRAINING PROGRAM

## 2024-09-20 PROCEDURE — 97116 GAIT TRAINING THERAPY: CPT

## 2024-09-20 PROCEDURE — 80048 BASIC METABOLIC PNL TOTAL CA: CPT | Performed by: STUDENT IN AN ORGANIZED HEALTH CARE EDUCATION/TRAINING PROGRAM

## 2024-09-20 PROCEDURE — 25000003 PHARM REV CODE 250: Performed by: STUDENT IN AN ORGANIZED HEALTH CARE EDUCATION/TRAINING PROGRAM

## 2024-09-20 PROCEDURE — 97530 THERAPEUTIC ACTIVITIES: CPT

## 2024-09-20 PROCEDURE — 36415 COLL VENOUS BLD VENIPUNCTURE: CPT | Performed by: STUDENT IN AN ORGANIZED HEALTH CARE EDUCATION/TRAINING PROGRAM

## 2024-09-20 PROCEDURE — 85025 COMPLETE CBC W/AUTO DIFF WBC: CPT | Performed by: STUDENT IN AN ORGANIZED HEALTH CARE EDUCATION/TRAINING PROGRAM

## 2024-09-20 RX ORDER — AMLODIPINE BESYLATE 10 MG/1
10 TABLET ORAL DAILY
Start: 2024-09-20 | End: 2024-10-20

## 2024-09-20 RX ORDER — ASPIRIN 81 MG/1
81 TABLET ORAL 2 TIMES DAILY
Start: 2024-09-20 | End: 2024-10-20

## 2024-09-20 RX ORDER — DOCUSATE SODIUM 100 MG/1
100 CAPSULE, LIQUID FILLED ORAL 2 TIMES DAILY
Start: 2024-09-20

## 2024-09-20 RX ORDER — HYDROCODONE BITARTRATE AND ACETAMINOPHEN 7.5; 325 MG/1; MG/1
1 TABLET ORAL EVERY 6 HOURS PRN
Qty: 10 TABLET | Refills: 0 | Status: SHIPPED | OUTPATIENT
Start: 2024-09-20

## 2024-09-20 RX ADMIN — PANTOPRAZOLE SODIUM 40 MG: 40 TABLET, DELAYED RELEASE ORAL at 09:09

## 2024-09-20 RX ADMIN — PRAVASTATIN SODIUM 40 MG: 40 TABLET ORAL at 09:09

## 2024-09-20 RX ADMIN — ENOXAPARIN SODIUM 40 MG: 40 INJECTION SUBCUTANEOUS at 04:09

## 2024-09-20 RX ADMIN — HYDROCODONE BITARTRATE AND ACETAMINOPHEN 1 TABLET: 7.5; 325 TABLET ORAL at 01:09

## 2024-09-20 RX ADMIN — AMLODIPINE BESYLATE 10 MG: 5 TABLET ORAL at 09:09

## 2024-09-20 NOTE — PROGRESS NOTES
"[facility]  Progress Note    Patient Name: Marya Carranza  MRN: [id number]  Patient Class: IP- Inpatient   Admission Date: 9/14/2024  Length of Stay: 6 days  Attending Physician: Dick Ham DO  Primary Care Provider: Phillip Levy MD        Subjective:     Principal Problem:Closed fracture of left hip        HPI:   84 y.o. female, with a PMHx of DM, HTN, HLD presents ED via EMS with complaints of left hip pain after sustaining a fall today.  Stated that she tripped over her dog and fell forward landing on her left hip.  Denies any head injury, loss of consciousness, neck pain, numbness or tingling, or any new weaknesses.  Required help from her family to get back up, but unable to bear weight on her left hip due to the pain.  Denies any anticoagulation.Sister at bedside. Of note, she fell a few weeks ago because she was "dehydrated" and required staples in her head.     In the ED, patient was hypertensive with systolic blood pressure in the 190s.  No hypoxia.  Labs with normocytic anemia, appears to be near baseline.  Left hip x-ray and CT left hip with roximal left femoral fracture.  CT head with no acute intracranial abnormalities.  Chest x-ray with no large focal consolidation.  Orthopedic surgery was consulted in the ED (Dr. Greene spoke with Dr. Caal).  Admitted to hospital medicine for further evaluation and management    Overview/Hospital Course:  84 y.o. female admitted on 09/14/24 for acute proximal left femoral fracture.  Orthopedic surgery consulted-s/p intramedullary nailing of the left hip on 09/15/2024.  PT/OT consulted postoperatively-recommends moderate intensity there.  Also found to have hypomagnesemia, electrolytes replaced as needed. Hgb 6.6 on 09/16, transfused 1U pRBC oon 09/16.  Continue symptomatic management and supportive care.  Pain control.  Overnight on 09/16, patient with acute hypoxia with O2 sats in the 80s, requiring nasal cannula with " improvement.  History of diastolic heart failure.  BNP 1062.  Of note patient received fluids postoperatively and also unit of blood on 09/16, likely overloaded. CXR w/Right basilar opacity, which may be related to atelectasis or early infiltrate.  Given IV Lasix with improvement.  Encourage IS.  Wean O2 as tolerated.  Patient medically stable, awaiting skilled nursing facility placed    Interval History:  No acute overnight events.  Patient remained afebrile.  Still on nasal cannula, but weaning down.  Patient is asymptomatic, denies any chest pain or shortness a breath.  Had large bowel movement overnight.  Denies any bleeding.  Hemoglobin stable.  Pain control.      Review of Systems   Constitutional:  Positive for activity change. Negative for chills, fatigue and fever.   Respiratory:  Negative for cough, chest tightness and shortness of breath.    Cardiovascular:  Negative for chest pain.   Musculoskeletal:  Positive for arthralgias (improving) and gait problem (Improving, cooperating with PT/OT). Negative for joint swelling.   Neurological:  Positive for weakness.   Psychiatric/Behavioral:  Negative for confusion and hallucinations.      Objective:     Vital Signs (Most Recent):  Temp: 97.2 °F (36.2 °C) (09/20/24 1115)  Pulse: 97 (09/20/24 1115)  Resp: 20 (09/20/24 1354)  BP: (!) 171/72 (09/20/24 1115)  SpO2: (!) 92 % (09/20/24 1115) Vital Signs (24h Range):  Temp:  [97.2 °F (36.2 °C)-98.7 °F (37.1 °C)] 97.2 °F (36.2 °C)  Pulse:  [80-97] 97  Resp:  [14-20] 20  SpO2:  [92 %-96 %] 92 %  BP: (133-171)/(66-73) 171/72     Weight: 58.1 kg (128 lb 1.4 oz)  Body mass index is 22.69 kg/m².    Intake/Output Summary (Last 24 hours) at 9/20/2024 1503  Last data filed at 9/20/2024 0935  Gross per 24 hour   Intake 365 ml   Output 620 ml   Net -255 ml         Physical Exam  Vitals and nursing note reviewed.   Constitutional:       General: She is not in acute distress.     Appearance: She is not ill-appearing.   HENT:       Nose: Nose normal.      Mouth/Throat:      Mouth: Mucous membranes are moist.   Eyes:      Extraocular Movements: Extraocular movements intact.   Cardiovascular:      Rate and Rhythm: Normal rate and regular rhythm.      Heart sounds: No murmur heard.  Pulmonary:      Effort: Pulmonary effort is normal. No respiratory distress.      Comments: On nasal cannula, RLL breath sounds improving.   Abdominal:      General: There is no distension.      Palpations: Abdomen is soft.   Musculoskeletal:         General: Tenderness (left hip pain) present. No swelling.      Right lower leg: No edema.      Left lower leg: No edema.      Comments: ROM limited due to pain. She appears to be neurovascular intact distally to left lower extremity.   Skin:     General: Skin is warm and dry.      Coloration: Skin is pale.      Comments: Incision/ dressing clean/dry/intact   Neurological:      General: No focal deficit present.      Mental Status: She is alert and oriented to person, place, and time.   Psychiatric:         Mood and Affect: Mood normal.         Thought Content: Thought content normal.             Significant Labs: All pertinent labs within the past 24 hours have been reviewed.    Significant Imaging: I have reviewed all pertinent imaging results/findings within the past 24 hours.    Assessment/Plan:      * Closed fracture of left hip  -presented with left hip pain after a mechanical fall (she tripped over her puppy).   -Left hip x-ray and CT left hip with roximal left femoral fracture.    -CT head with no acute intracranial abnormalities.    -Chest x-ray on admission  with no large focal consolidation.    -Orthopedic surgery was consulted:s/p  intramedullary nailing of the left hip on 09/15/2024.    -PT/OT consulted:  Recommends moderate intensity therapy  -medically stable, awaiting SNF placement    Acute on chronic diastolic heart failure  -history of grade 1 diastolic dysfunction, preserved EF  -hypoxic overnight, O2  sats in the 80s require nasal cannula for improvement.  -of note, patient received a unit of packed red blood cells on 09/16, and also fluids postoperatively  -BNP elevated 1062  -chest x-ray with right basilar opacity  -given IV lasix 20mg x 2 with improvement. Lasix PRN  -encourage IS   -wean O2 as tolerated  -repeat CXR on 09/18 with improvement       Acute respiratory failure with hypoxia  Patient with Hypoxic Respiratory failure which is Acute.  she is not on home oxygen. Supplemental oxygen was provided and noted- Oxygen Concentration (%):  [1.5] 1.5    .   Signs/symptoms of respiratory failure include- tachypnea and increased work of breathing. Contributing diagnoses includes - CHF Labs and images were reviewed. Patient Has recent ABG, which has been reviewed. Will treat underlying causes and adjust management of respiratory failure as follows-     -secondary to CHF.   -see plan as above for acute on chronic diastolic heart failure   -wean O2 as tolerated    HLD (hyperlipidemia)  -resume home pravastatin      Anemia  Anemia is likely due to acute blood loss which was from surgery (s/p left hip intramedullary nailing on 09/15)  and Iron deficiency. Most recent hemoglobin and hematocrit are listed below.  Recent Labs     09/18/24  0438 09/19/24  0428 09/20/24  0459   HGB 7.7* 7.4* 8.6*  8.6*   HCT 22.8* 23.0* 26.5*  26.5*       Plan  - Monitor serial CBC: Daily  - Transfuse PRBC if patient becomes hemodynamically unstable, symptomatic or H/H drops below 7/21.  - Patient has received 1 units of PRBCs on 09/16/2024  - Patient's anemia is currently improving  - discussed with Orthopedic surgery.  Recommended aspirin 81 mg b.i.d. for 4 weeks postoperatively on discharge  - monitor.     Hypomagnesemia  Patient has Abnormal Magnesium: hypomagnesemia. Will continue to monitor electrolytes closely. Will replace the affected electrolytes and repeat labs to be done after interventions completed. The patient's magnesium  results have been reviewed and are listed below.  Recent Labs   Lab 09/20/24  0459   MG 2.0      Continue to replace as needed    Hypophosphatemia  Patient has Abnormal Phosphorus: hypophosphatemia. Will continue to monitor electrolytes closely. Will replace the affected electrolytes and repeat labs to be done after interventions completed. The patient's phosphorus results have been reviewed and are listed below.  Recent Labs   Lab 09/20/24  0459   PHOS 3.3        -replace as needed    Hypertension  Chronic, controlled.   -resume home amlodipine   -BP uncontrolled, titrate home amlodipine. Increased to 10mg on 09/15  -blood pressure better controlled  -IV hydralazine PRN for SBP>180    Latest blood pressure and vitals reviewed-     Temp:  [97.2 °F (36.2 °C)-98.7 °F (37.1 °C)]   Pulse:  [80-97]   Resp:  [14-20]   BP: (133-171)/(66-73)   SpO2:  [92 %-96 %] .   Home meds for hypertension were reviewed and noted below.   Hypertension Medications               amLODIPine (NORVASC) 5 MG tablet Take 1 tablet (5 mg total) by mouth once daily.            While in the hospital, will manage blood pressure as follows; Continue home antihypertensive regimen    Will utilize p.r.n. blood pressure medication only if patient's blood pressure greater than 180/110 and she develops symptoms such as worsening chest pain or shortness of breath.    Type 2 diabetes mellitus  Patient's FSGs are controlled on current medication regimen.  Last A1c reviewed-   Lab Results   Component Value Date    HGBA1C 7.4 (H) 09/14/2024     Most recent fingerstick glucose reviewed-   Recent Labs   Lab 09/18/24  2306 09/19/24  0722 09/19/24  1149   POCTGLUCOSE 241* 291* 290*       Current correctional scale  Low  Maintain anti-hyperglycemic dose as follows-   Antihyperglycemics (From admission, onward)      Start     Stop Route Frequency Ordered    09/14/24 1608  insulin aspart U-100 pen 0-5 Units         -- SubQ Before meals & nightly PRN 09/14/24 9973           Hold Oral hypoglycemics while patient is in the hospital.  Repeat A1C- 7.4  Meds: SSI PRN to maintain goal 140-180  ADA diet when appropriate, accuchecks ACHS, hypoglycemic protocol      Hyponatremia  Hyponatremia is likely due to Heart Failure. The patient's most recent sodium results are listed below.  Recent Labs     09/18/24  0438 09/19/24  0428 09/20/24  0459   * 130* 133*       Plan  - Correct the sodium by 4-6mEq in 24 hours.   - Will treat the hyponatremia with Fluid restriction of:  1.5 liter per day  - Monitor sodium Daily.   - Patient hyponatremia is improving    Advanced care planning/counseling discussion  Advance Care Planning    Date: 09/14/2024    Code Status  I engaged the the patient in a voluntary conversation about the patient's preferences for care  at the very end of life. The patient wishes to have CPR or other invasive treatments performed when her heart and/or breathing stops. I communicated to the patient that her wishes align with full code status and she agrees. Daughter at bedside and present for discussion.     A total of 16 min was spent on advance care planning, goals of care discussion, emotional support, formulating and communicating prognosis and exploring burden/benefit of various approaches of treatment. This discussion occurred on a fully voluntary basis with the verbal consent of the patient and/or family.             VTE Risk Mitigation (From admission, onward)           Ordered     enoxaparin injection 40 mg  Daily         09/19/24 1316     IP VTE HIGH RISK PATIENT  Once         09/14/24 1509     Place sequential compression device  Until discontinued         09/14/24 1509                    Discharge Planning   OSCAR: 9/20/2024     Code Status: Full Code   Is the patient medically ready for discharge?:     Reason for patient still in hospital (select all that apply): Patient trending condition, Treatment, Consult recommendations, PT / OT recommendations, and Pending  disposition  Discharge Plan A: Skilled Nursing Facility   Discharge Delays: None known at this time              Dick Ham DO  Department of Hospital Medicine   [facility] [place] [facility]

## 2024-09-20 NOTE — PLAN OF CARE
Problem: Adult Inpatient Plan of Care  Goal: Optimal Comfort and Wellbeing  Outcome: Progressing  Intervention: Monitor Pain and Promote Comfort  Flowsheets (Taken 9/20/2024 0608)  Pain Management Interventions:   pillow support provided   quiet environment facilitated   pain management plan reviewed with patient/caregiver   care clustered     Problem: Pain Acute  Goal: Optimal Pain Control and Function  Outcome: Progressing  Intervention: Develop Pain Management Plan  Flowsheets (Taken 9/20/2024 0608)  Pain Management Interventions:   pillow support provided   quiet environment facilitated   pain management plan reviewed with patient/caregiver   care clustered     Problem: Hip Arthroplasty  Goal: Effective Urinary Elimination  Outcome: Progressing  Intervention: Monitor and Manage Urinary Retention  Flowsheets (Taken 9/20/2024 0608)  Urinary Elimination Promotion: absorbent pad/diaper use encouraged

## 2024-09-20 NOTE — SUBJECTIVE & OBJECTIVE
Interval History:  No acute overnight events.  Patient remained afebrile.  Still on nasal cannula, but weaning down.  Patient is asymptomatic, denies any chest pain or shortness a breath.  Had large bowel movement overnight.  Denies any bleeding.  Hemoglobin stable.  Pain control.      Review of Systems   Constitutional:  Positive for activity change. Negative for chills, fatigue and fever.   Respiratory:  Negative for cough, chest tightness and shortness of breath.    Cardiovascular:  Negative for chest pain.   Musculoskeletal:  Positive for arthralgias (improving) and gait problem (Improving, cooperating with PT/OT). Negative for joint swelling.   Neurological:  Positive for weakness.   Psychiatric/Behavioral:  Negative for confusion and hallucinations.      Objective:     Vital Signs (Most Recent):  Temp: 97.2 °F (36.2 °C) (09/20/24 1115)  Pulse: 97 (09/20/24 1115)  Resp: 20 (09/20/24 1354)  BP: (!) 171/72 (09/20/24 1115)  SpO2: (!) 92 % (09/20/24 1115) Vital Signs (24h Range):  Temp:  [97.2 °F (36.2 °C)-98.7 °F (37.1 °C)] 97.2 °F (36.2 °C)  Pulse:  [80-97] 97  Resp:  [14-20] 20  SpO2:  [92 %-96 %] 92 %  BP: (133-171)/(66-73) 171/72     Weight: 58.1 kg (128 lb 1.4 oz)  Body mass index is 22.69 kg/m².    Intake/Output Summary (Last 24 hours) at 9/20/2024 1503  Last data filed at 9/20/2024 0935  Gross per 24 hour   Intake 365 ml   Output 620 ml   Net -255 ml         Physical Exam  Vitals and nursing note reviewed.   Constitutional:       General: She is not in acute distress.     Appearance: She is not ill-appearing.   HENT:      Nose: Nose normal.      Mouth/Throat:      Mouth: Mucous membranes are moist.   Eyes:      Extraocular Movements: Extraocular movements intact.   Cardiovascular:      Rate and Rhythm: Normal rate and regular rhythm.      Heart sounds: No murmur heard.  Pulmonary:      Effort: Pulmonary effort is normal. No respiratory distress.      Comments: On nasal cannula, RLL breath sounds improving.    Abdominal:      General: There is no distension.      Palpations: Abdomen is soft.   Musculoskeletal:         General: Tenderness (left hip pain) present. No swelling.      Right lower leg: No edema.      Left lower leg: No edema.      Comments: ROM limited due to pain. She appears to be neurovascular intact distally to left lower extremity.   Skin:     General: Skin is warm and dry.      Coloration: Skin is pale.      Comments: Incision/ dressing clean/dry/intact   Neurological:      General: No focal deficit present.      Mental Status: She is alert and oriented to person, place, and time.   Psychiatric:         Mood and Affect: Mood normal.         Thought Content: Thought content normal.             Significant Labs: All pertinent labs within the past 24 hours have been reviewed.    Significant Imaging: I have reviewed all pertinent imaging results/findings within the past 24 hours.

## 2024-09-20 NOTE — ASSESSMENT & PLAN NOTE
Hyponatremia is likely due to Heart Failure. The patient's most recent sodium results are listed below.  Recent Labs     09/18/24  0438 09/19/24  0428 09/20/24  0459   * 130* 133*       Plan  - Correct the sodium by 4-6mEq in 24 hours.   - Will treat the hyponatremia with Fluid restriction of:  1.5 liter per day  - Monitor sodium Daily.   - Patient hyponatremia is improving

## 2024-09-20 NOTE — ASSESSMENT & PLAN NOTE
Patient has Abnormal Phosphorus: hypophosphatemia. Will continue to monitor electrolytes closely. Will replace the affected electrolytes and repeat labs to be done after interventions completed. The patient's phosphorus results have been reviewed and are listed below.  Recent Labs   Lab 09/20/24  0456   PHOS 3.3        -replace as needed

## 2024-09-20 NOTE — NURSING
[facility] [place]  Nurses Note -- 4 Eyes      9/20/2024       Skin assessed on: Q Shift      [] No Pressure Injuries Present    []Prevention Measures Documented    [] Yes LDA  for Pressure Injury Previously documented     [] Yes New Pressure Injury Discovered   [] LDA for New Pressure Injury Added      Attending RN:  Mary Jo Lewis RN     Second RN:  BITA Mariano

## 2024-09-20 NOTE — PT/OT/SLP PROGRESS
Occupational Therapy      Patient Name:  Marya Carranza   MRN:  [id number]    Patient not seen today secondary to Other (Comment) (patient working with PT on 1st attempt at 3:10 pm and then D/C to SNF). 9/20/2024

## 2024-09-20 NOTE — PLAN OF CARE
[facility]     Department of Hospital Medicine     [address] [place] [address]     [phone] after hours  [phone] fax       NURSING HOME ORDERS    09/20/2024    Admit to Nursing Home:  Skilled Bed                                                Diagnoses:  Active Hospital Problems    Diagnosis  POA    *Closed fracture of left hip [S72.002A]  Yes     Priority: 1 - High    Acute on chronic diastolic heart failure [I50.33]  No     Priority: 2     Acute respiratory failure with hypoxia [J96.01]  Yes     Priority: 3     HLD (hyperlipidemia) [E78.5]  Yes     Priority: 4     Anemia [D64.9]  Yes     Priority: 5     Hypomagnesemia [E83.42]  Yes     Priority: 6     Hypophosphatemia [E83.39]  Yes     Priority: 7     Hypertension [I10]  Yes     Priority: 8     Type 2 diabetes mellitus [E11.9]  Yes     Priority: 9      Chronic    Hyponatremia [E87.1]  No     Priority: 10     Advanced care planning/counseling discussion [Z71.89]  Not Applicable     Priority: 11       Resolved Hospital Problems   No resolved problems to display.       Patient is homebound due to:  Closed fracture of left hip    Allergies:Review of patient's allergies indicates:  No Known Allergies    Vitals:       Per facility    Diet: cardiac      Acitivities:      - as tolerated    LABS:  Per facility protocol    Nursing Precautions:    - Fall precautions per nursing home protocol   - Decubitus precautions:        -  for positioning   - Pressure reducing foam mattress   - Turn patient every two hours. Use wedge pillows to anchor patient    CONSULTS:      Physical Therapy to evaluate and treat     Occupational Therapy to evaluate and treat      MISCELLANEOUS CARE:       Routine Skin for Bedridden Patients:  Apply moisture barrier cream to all    skin folds and wet areas in perineal area daily and after baths and                           all bowel movements.                     DIABETES CARE:         Check blood sugar:     Fingerstick blood sugar AC and HS        Report CBG < 60 or > 400 to physician.                                          Insulin Sliding Scale          Glucose  Novolog Insulin Subcutaneous        0 - 60   Orange juice or glucose tablet, hold insulin      No insulin   201-250  2 units   251-300  4 units   301-350  6 units   351-400  8 units   >400   10 units then call physician      Medications: Discontinue all previous medication orders, if any. See new list below.       Medication List        START taking these medications      aspirin 81 MG EC tablet  Commonly known as: ECOTRIN  Take 1 tablet (81 mg total) by mouth 2 (two) times a day.     docusate sodium 100 MG capsule  Commonly known as: COLACE  Take 1 capsule (100 mg total) by mouth 2 (two) times daily.     HYDROcodone-acetaminophen 7.5-325 mg per tablet  Commonly known as: NORCO  Take 1 tablet by mouth every 6 (six) hours as needed for Pain.            CHANGE how you take these medications      amLODIPine 10 MG tablet  Commonly known as: NORVASC  Take 1 tablet (10 mg total) by mouth once daily.  What changed:   medication strength  how much to take            CONTINUE taking these medications      baclofen 10 MG tablet  Commonly known as: LIORESAL  Take 0.5 tablets (5 mg total) by mouth 3 (three) times daily.     omeprazole 20 MG capsule  Commonly known as: PRILOSEC  Take 20 mg by mouth 2 (two) times daily.     pravastatin 40 MG tablet  Commonly known as: PRAVACHOL  Take 40 mg by mouth once daily.                    _________________________________  Dick Ham, DO  09/20/2024

## 2024-09-20 NOTE — NURSING
Patient escorted via stretcher for discharge to [facility] rehab by EMS transport No apparent distress noted.

## 2024-09-20 NOTE — ASSESSMENT & PLAN NOTE
Anemia is likely due to acute blood loss which was from surgery (s/p left hip intramedullary nailing on 09/15)  and Iron deficiency. Most recent hemoglobin and hematocrit are listed below.  Recent Labs     09/18/24  0438 09/19/24  0428 09/20/24  0459   HGB 7.7* 7.4* 8.6*  8.6*   HCT 22.8* 23.0* 26.5*  26.5*       Plan  - Monitor serial CBC: Daily  - Transfuse PRBC if patient becomes hemodynamically unstable, symptomatic or H/H drops below 7/21.  - Patient has received 1 units of PRBCs on 09/16/2024  - Patient's anemia is currently improving  - discussed with Orthopedic surgery.  Recommended aspirin 81 mg b.i.d. for 4 weeks postoperatively on discharge  - monitor.

## 2024-09-20 NOTE — ASSESSMENT & PLAN NOTE
Patient with Hypoxic Respiratory failure which is Acute.  she is not on home oxygen. Supplemental oxygen was provided and noted- Oxygen Concentration (%):  [1.5] 1.5    .   Signs/symptoms of respiratory failure include- tachypnea and increased work of breathing. Contributing diagnoses includes - CHF Labs and images were reviewed. Patient Has recent ABG, which has been reviewed. Will treat underlying causes and adjust management of respiratory failure as follows-     -secondary to CHF.   -see plan as above for acute on chronic diastolic heart failure   -wean O2 as tolerated

## 2024-09-20 NOTE — PT/OT/SLP PROGRESS
Physical Therapy Treatment    Patient Name:  Marya Carranza   MRN:  [id number]    Recommendations:     Discharge Recommendations: Moderate Intensity Therapy  Discharge Equipment Recommendations: to be determined by next level of care  Barriers to discharge: Decreased caregiver support    Assessment:     Marya Carranza is a 84 y.o. female admitted with a medical diagnosis of Closed fracture of left hip.  She presents with the following impairments/functional limitations: weakness, impaired endurance, impaired sensation, impaired self care skills, impaired balance, gait instability, impaired functional mobility, decreased ROM.    Pt able to sit EOB x 10 min while assessing vital due to reports of dizziness, unable to wean pt from NC. Had pt perform deep breathing as well as using spirometer to improve lung capacity. During ambulation, pt required 4L02 and still c/o dizziness requiring long standing RB in between each step.     Pt did ambulate further than previous days, however she remains unable to advance L+ LE independently and mod to max assist for weight shifting onto L+ LE to step with R+    Rehab Prognosis: Good; patient would benefit from acute skilled PT services to address these deficits and reach maximum level of function.    Recent Surgery: Procedure(s) (LRB):  INSERTION, INTRAMEDULLARY GONZALEZ, FEMUR (Left) 5 Days Post-Op    Plan:     During this hospitalization, patient to be seen BID to address the identified rehab impairments via therapeutic activities, therapeutic exercises, neuromuscular re-education, gait training and progress toward the following goals:    Plan of Care Expires:  09/30/24    Subjective     Chief Complaint: Pt states that she cannot move her leg and she is dizzy   Patient/Family Comments/goals: Improve walking  Pain/Comfort:  Pain Rating 1: 0/10      Objective:     Communicated with nursing prior to session.  Patient found HOB elevated with telemetry, pulse ox (continuous), PureWick, bed  alarm upon PT entry to room.     General Precautions: Standard, fall  Orthopedic Precautions: LLE weight bearing as tolerated  Braces: N/A  Respiratory Status: Nasal cannula, flow 3.5 L/min     Functional Mobility:  Bed Mobility:     Rolling Left:  minimum assistance  Scooting: minimum assistance  Bridging: stand by assistance  Supine to Sit: min assistance and of 1 persons  Transfers:     Sit to Stand:  minimum assistance with rolling walker from bed ; from chair moderate assist to RW which was performed multiple times during cleaning after pt had a BM  Gait: 10 feet, RW, mod A - see assessment for details   Balance: Static Sit Good, Static Stand with RW Fair (-)      AM-PAC 6 CLICK MOBILITY  Turning over in bed (including adjusting bedclothes, sheets and blankets)?: 3  Sitting down on and standing up from a chair with arms (e.g., wheelchair, bedside commode, etc.): 3  Moving from lying on back to sitting on the side of the bed?: 3  Moving to and from a bed to a chair (including a wheelchair)?: 3  Need to walk in hospital room?: 2  Climbing 3-5 steps with a railing?: 1  Basic Mobility Total Score: 15       Treatment & Education:  TA 1:1 x 30 min to get pt from supine to L+ SL with sit with mod verbal cues for strategy, min A for placement of R+ hand on HR to assist with pulling herself up to sitting. Today, she was able to negotiate her L+ LE off EOB with little to no assist . Sit to stand with min A to RW from bed, however from BSC and recliner, min to mod A posterior lean requiring assist to maintain balance.  Pt with max A required with sheet in order to reposition in recliner.   GT 1:1 x 8 min recliner had to be pulled behind her due to dizziness.    Patient left up in chair with all lines intact, call button in reach, and nursing notified..    GOALS:   Multidisciplinary Problems       Physical Therapy Goals          Problem: Physical Therapy    Goal Priority Disciplines Outcome Goal Variances Interventions    Physical Therapy Goal     PT, PT/OT Progressing     Description: Goals to be met by: 2024     Patient will increase functional independence with mobility by performin. Supine to sit with MInimal Assistance  2. Sit to supine with MInimal Assistance  3. Sit to stand transfer with Minimal Assistance  4. Bed to chair transfer with Minimal Assistance using Rolling Walker  5. Gait  x 100 feet with Minimal Assistance using Rolling Walker.                          Time Tracking:     PT Received On:    PT Start Time: 1300     PT Stop Time: 1340  PT Total Time (min): 40 min     Billable Minutes: Therapeutic Activity 2 and Gait Training 1    Treatment Type: Treatment  PT/PTA: PT     Number of PTA visits since last PT visit: 0     2024

## 2024-09-20 NOTE — ASSESSMENT & PLAN NOTE
Patient has Abnormal Magnesium: hypomagnesemia. Will continue to monitor electrolytes closely. Will replace the affected electrolytes and repeat labs to be done after interventions completed. The patient's magnesium results have been reviewed and are listed below.  Recent Labs   Lab 09/20/24  0459   MG 2.0      Continue to replace as needed

## 2024-09-20 NOTE — PLAN OF CARE
09/20/24 1124   Medicare Message   Important Message from Medicare regarding Discharge Appeal Rights Given to patient/caregiver;Explained to patient/caregiver;Signed/date by patient/caregiver   Date IMM was signed 09/20/24   Time IMM was signed 1124

## 2024-09-20 NOTE — NURSING
Report called to Elinor PATRICIA at [place] [phone] room 135. Will continue to monitor while awaiting transport

## 2024-09-20 NOTE — DISCHARGE SUMMARY
"[facility] Medicine  Discharge Summary      Patient Name: Marya Carranza  MRN: [id number]  MEL: [id number]  Patient Class: IP- Inpatient  Admission Date: 9/14/2024  Hospital Length of Stay: 6 days  Discharge Date and Time:  09/20/2024 3:10 PM  Attending Physician: Dick Ham DO   Discharging Provider: Dick Ham DO  Primary Care Provider: Phillip Levy MD    Primary Care Team: Networked reference to record PCT     HPI:    84 y.o. female, with a PMHx of DM, HTN, HLD presents ED via EMS with complaints of left hip pain after sustaining a fall today.  Stated that she tripped over her dog and fell forward landing on her left hip.  Denies any head injury, loss of consciousness, neck pain, numbness or tingling, or any new weaknesses.  Required help from her family to get back up, but unable to bear weight on her left hip due to the pain.  Denies any anticoagulation.Sister at bedside. Of note, she fell a few weeks ago because she was "dehydrated" and required staples in her head.     In the ED, patient was hypertensive with systolic blood pressure in the 190s.  No hypoxia.  Labs with normocytic anemia, appears to be near baseline.  Left hip x-ray and CT left hip with roximal left femoral fracture.  CT head with no acute intracranial abnormalities.  Chest x-ray with no large focal consolidation.  Orthopedic surgery was consulted in the ED (Dr. Greene spoke with Dr. Caal).  Admitted to hospital medicine for further evaluation and management    Procedure(s) (LRB):  INSERTION, INTRAMEDULLARY GONZALEZ, FEMUR (Left)      Hospital Course:   84 y.o. female admitted on 09/14/24 for acute proximal left femoral fracture.  Orthopedic surgery consulted-s/p intramedullary nailing of the left hip on 09/15/2024.  PT/OT consulted postoperatively-recommends moderate intensity there.  Also found to have hypomagnesemia, electrolytes replaced as needed. Hgb 6.6 on 09/16, transfused 1U pRBC oon 09/16.  " Continue symptomatic management and supportive care.  Pain control.  Overnight on 09/16, patient with acute hypoxia with O2 sats in the 80s, requiring nasal cannula with improvement.  History of diastolic heart failure.  BNP 1062.  Of note patient received fluids postoperatively and also unit of blood on 09/16, likely overloaded. CXR w/Right basilar opacity, which may be related to atelectasis or early infiltrate.  Given IV Lasix with improvement.  Encourage IS.  Wean O2 as tolerated.  Patient medically stable, awaiting skilled nursing facility placed. Now accepted to [facility].    Still with some left hip pain, but well controlled. Pt denies any fever, headaches, vision changes, chest pain, shortness of breath, palpitations, abdominal pain, nausea, vomiting, or any new weaknesses. Feels ready to go to SNF. Patient's exam on discharge was as follow: Patient is alert and oriented, appears in no acute distress, heart with regular rate and rhythm, lungs- RLL breath sounds improving , abdomen soft, and nontender. Bilateral lower extremities without any edema or calf tenderness.  Incision/ dressing clean/dry/intact     Patient was counseled regarding any abnormal labs, differential diagnosis, treatment options, risk-benefit, lifestyle changes, prognosis, current condition, and medications. Patient was interactive and attentive.  Patient's questions were answered in a respectful and timely manner. Patient was instructed to follow-up with PCP within 1 week after SNF and to continue taking medications as prescribed.  Instructed to also follow up with Orthopedic surgery for post operative follow. Also, extensively discussed the risks, benefits, and side effects of patient's medications. Discussed with patient about any medication changes. Patient verbalized understanding and agrees to treatment plan.  Patient is stable for discharge.  Patient has no other questions or concerns at this time.  ED precautions discussed with  the patient.    Vital signs are stable. Tolerating p.o. intake without any nausea or vomiting. Afebrile for over 24 hours. Patient is in stable condition and has no questions or concerns. Patient will be discharge to skilled nursing facility once accepted and facility is prepared for patient's arrival .  Plan of care completed.  CM/SW to assist with discharge planning.      Vitals:    09/20/24 0741 09/20/24 0817 09/20/24 1115 09/20/24 1354   BP:   (!) 171/72    BP Location:       Patient Position:   Lying    Pulse: 87 87 97    Resp:   16 20   Temp:   97.2 °F (36.2 °C)    TempSrc:   Axillary    SpO2:   (!) 92%    Weight:       Height:              Goals of Care Treatment Preferences:  Code Status: Full Code         Consults:   Consults (From admission, onward)          Status Ordering Provider     Inpatient consult to Social Work  Once        Provider:  (Not yet assigned)    Completed PHILLIP HURLEY.     Inpatient consult to Social Work/Case Management  Once        Provider:  (Not yet assigned)    Completed PHILLIP HURLEY.     Inpatient consult to Registered Dietitian/Nutritionist  Once        Provider:  (Not yet assigned)    Completed PHILLIP HURLEY.     Inpatient consult to Orthopedic Surgery  Once        Provider:  Jose Alberto Caal MD    Completed WALE PEARCE            No new Assessment & Plan notes have been filed under this hospital service since the last note was generated.  Service: Hospital Medicine    Final Active Diagnoses:    Diagnosis Date Noted POA    PRINCIPAL PROBLEM:  Closed fracture of left hip [S72.002A] 09/14/2024 Yes    Acute on chronic diastolic heart failure [I50.33] 09/17/2024 No    Acute respiratory failure with hypoxia [J96.01] 06/24/2024 Yes    HLD (hyperlipidemia) [E78.5] 06/24/2024 Yes    Anemia [D64.9] 06/24/2024 Yes    Hypomagnesemia [E83.42] 06/24/2024 Yes    Hypophosphatemia [E83.39] 06/24/2024 Yes    Hypertension [I10] 06/24/2024 Yes    Type 2 diabetes mellitus [E11.9]  02/08/2023 Yes     Chronic    Hyponatremia [E87.1] 09/18/2024 No    Advanced care planning/counseling discussion [Z71.89] 09/14/2024 Not Applicable      Problems Resolved During this Admission:       Discharged Condition: stable    Disposition: Skilled Nursing Facility    Follow Up:   Follow-up Information       Jose Alberto Caal MD Follow up.    Specialty: Orthopedic Surgery  Contact information:  [address]  [place] [address]  [phone]               Home, [facility] Follow up.    Specialties: Nursing Home Agency, SNF Agency  Why: SNF Facility  Contact information:  [address]  [place] [address]  [phone]                           Patient Instructions:      Diet Cardiac     Notify your health care provider if you experience any of the following:  temperature >100.4     Notify your health care provider if you experience any of the following:  persistent nausea and vomiting or diarrhea     Notify your health care provider if you experience any of the following:  severe uncontrolled pain     Notify your health care provider if you experience any of the following:  redness, tenderness, or signs of infection (pain, swelling, redness, odor or green/yellow discharge around incision site)     Notify your health care provider if you experience any of the following:  increased confusion or weakness     Activity as tolerated       Significant Diagnostic Studies: Labs: All labs within the past 24 hours have been reviewed      Recent Results (from the past 100 hour(s))   Hemoglobin    Collection Time: 09/16/24  2:23 PM   Result Value Ref Range    Hemoglobin 8.8 (L) 12.0 - 16.0 g/dL   Hematocrit    Collection Time: 09/16/24  2:23 PM   Result Value Ref Range    Hematocrit 26.6 (L) 37.0 - 48.5 %   POCT glucose    Collection Time: 09/16/24  3:29 PM   Result Value Ref Range    POCT Glucose 253 (H) 70 - 110 mg/dL   POCT glucose    Collection Time: 09/16/24  9:58 PM   Result Value Ref  Range    POCT Glucose 282 (H) 70 - 110 mg/dL   ISTAT PROCEDURE    Collection Time: 09/16/24 10:24 PM   Result Value Ref Range    POC PH 7.442 7.35 - 7.45    POC PCO2 37.1 35 - 45 mmHg    POC PO2 49 (LL) 80 - 100 mmHg    POC HCO3 25.4 24 - 28 mmol/L    POC BE 1 -2 to 2 mmol/L    POC SATURATED O2 86 95 - 100 %    POC TCO2 26 23 - 27 mmol/L    Sample ARTERIAL     Site RB     Allens Test N/A     DelSys Nasal Can     Mode SPONT     Flow 3     FiO2 32     Sp02 93    Brain natriuretic peptide    Collection Time: 09/16/24 10:30 PM   Result Value Ref Range    BNP 1,062 (H) 0 - 99 pg/mL   Hemoglobin    Collection Time: 09/16/24 10:30 PM   Result Value Ref Range    Hemoglobin 8.6 (L) 12.0 - 16.0 g/dL   Hematocrit    Collection Time: 09/16/24 10:30 PM   Result Value Ref Range    Hematocrit 25.3 (L) 37.0 - 48.5 %   Basic Metabolic Panel (BMP)    Collection Time: 09/17/24  4:43 AM   Result Value Ref Range    Sodium 132 (L) 136 - 145 mmol/L    Potassium 3.7 3.5 - 5.1 mmol/L    Chloride 94 (L) 95 - 110 mmol/L    CO2 27 23 - 29 mmol/L    Glucose 214 (H) 70 - 110 mg/dL    BUN 13 8 - 23 mg/dL    Creatinine 0.7 0.5 - 1.4 mg/dL    Calcium 8.5 (L) 8.7 - 10.5 mg/dL    Anion Gap 11 8 - 16 mmol/L    eGFR >60 >60 mL/min/1.73 m^2   Magnesium    Collection Time: 09/17/24  4:43 AM   Result Value Ref Range    Magnesium 1.5 (L) 1.6 - 2.6 mg/dL   Phosphorus    Collection Time: 09/17/24  4:43 AM   Result Value Ref Range    Phosphorus 2.4 (L) 2.7 - 4.5 mg/dL   CBC with Automated Differential    Collection Time: 09/17/24  4:43 AM   Result Value Ref Range    WBC 10.53 3.90 - 12.70 K/uL    RBC 2.93 (L) 4.00 - 5.40 M/uL    Hemoglobin 8.4 (L) 12.0 - 16.0 g/dL    Hematocrit 26.0 (L) 37.0 - 48.5 %    MCV 89 82 - 98 fL    MCH 28.7 27.0 - 31.0 pg    MCHC 32.3 32.0 - 36.0 g/dL    RDW 14.3 11.5 - 14.5 %    Platelets 219 150 - 450 K/uL    MPV 10.8 9.2 - 12.9 fL    Immature Granulocytes 0.6 (H) 0.0 - 0.5 %    Gran # (ANC) 8.0 (H) 1.8 - 7.7 K/uL    Immature Grans  (Abs) 0.06 (H) 0.00 - 0.04 K/uL    Lymph # 1.1 1.0 - 4.8 K/uL    Mono # 1.4 (H) 0.3 - 1.0 K/uL    Eos # 0.0 0.0 - 0.5 K/uL    Baso # 0.02 0.00 - 0.20 K/uL    nRBC 0 0 /100 WBC    Gran % 75.4 (H) 38.0 - 73.0 %    Lymph % 10.4 (L) 18.0 - 48.0 %    Mono % 13.3 4.0 - 15.0 %    Eosinophil % 0.1 0.0 - 8.0 %    Basophil % 0.2 0.0 - 1.9 %    Differential Method Automated    Basic metabolic panel    Collection Time: 09/17/24  4:43 AM   Result Value Ref Range    Sodium 134 (L) 136 - 145 mmol/L    Potassium 3.8 3.5 - 5.1 mmol/L    Chloride 94 (L) 95 - 110 mmol/L    CO2 27 23 - 29 mmol/L    Glucose 220 (H) 70 - 110 mg/dL    BUN 12 8 - 23 mg/dL    Creatinine 0.8 0.5 - 1.4 mg/dL    Calcium 8.7 8.7 - 10.5 mg/dL    Anion Gap 13 8 - 16 mmol/L    eGFR >60 >60 mL/min/1.73 m^2   Hemoglobin    Collection Time: 09/17/24  4:43 AM   Result Value Ref Range    Hemoglobin 8.3 (L) 12.0 - 16.0 g/dL   Hematocrit    Collection Time: 09/17/24  4:43 AM   Result Value Ref Range    Hematocrit 25.5 (L) 37.0 - 48.5 %   POCT glucose    Collection Time: 09/17/24 12:06 PM   Result Value Ref Range    POCT Glucose 297 (H) 70 - 110 mg/dL   POCT glucose    Collection Time: 09/17/24  5:03 PM   Result Value Ref Range    POCT Glucose 309 (H) 70 - 110 mg/dL   POCT glucose    Collection Time: 09/17/24 11:43 PM   Result Value Ref Range    POCT Glucose 217 (H) 70 - 110 mg/dL   Basic metabolic panel    Collection Time: 09/18/24  4:38 AM   Result Value Ref Range    Sodium 129 (L) 136 - 145 mmol/L    Potassium 4.1 3.5 - 5.1 mmol/L    Chloride 91 (L) 95 - 110 mmol/L    CO2 28 23 - 29 mmol/L    Glucose 219 (H) 70 - 110 mg/dL    BUN 13 8 - 23 mg/dL    Creatinine 0.7 0.5 - 1.4 mg/dL    Calcium 8.2 (L) 8.7 - 10.5 mg/dL    Anion Gap 10 8 - 16 mmol/L    eGFR >60 >60 mL/min/1.73 m^2   Hemoglobin    Collection Time: 09/18/24  4:38 AM   Result Value Ref Range    Hemoglobin 7.7 (L) 12.0 - 16.0 g/dL   Hematocrit    Collection Time: 09/18/24  4:38 AM   Result Value Ref Range     Hematocrit 22.8 (L) 37.0 - 48.5 %   Magnesium    Collection Time: 09/18/24  4:38 AM   Result Value Ref Range    Magnesium 1.7 1.6 - 2.6 mg/dL   POCT glucose    Collection Time: 09/18/24 11:06 PM   Result Value Ref Range    POCT Glucose 241 (H) 70 - 110 mg/dL   Hemoglobin    Collection Time: 09/19/24  4:28 AM   Result Value Ref Range    Hemoglobin 7.4 (L) 12.0 - 16.0 g/dL   Hematocrit    Collection Time: 09/19/24  4:28 AM   Result Value Ref Range    Hematocrit 23.0 (L) 37.0 - 48.5 %   Basic metabolic panel    Collection Time: 09/19/24  4:28 AM   Result Value Ref Range    Sodium 130 (L) 136 - 145 mmol/L    Potassium 4.5 3.5 - 5.1 mmol/L    Chloride 92 (L) 95 - 110 mmol/L    CO2 29 23 - 29 mmol/L    Glucose 195 (H) 70 - 110 mg/dL    BUN 12 8 - 23 mg/dL    Creatinine 0.7 0.5 - 1.4 mg/dL    Calcium 8.5 (L) 8.7 - 10.5 mg/dL    Anion Gap 9 8 - 16 mmol/L    eGFR >60 >60 mL/min/1.73 m^2   Magnesium    Collection Time: 09/19/24  4:28 AM   Result Value Ref Range    Magnesium 1.8 1.6 - 2.6 mg/dL   Phosphorus    Collection Time: 09/19/24  4:28 AM   Result Value Ref Range    Phosphorus 3.6 2.7 - 4.5 mg/dL   Iron and TIBC    Collection Time: 09/19/24  4:28 AM   Result Value Ref Range    Iron 25 (L) 30 - 160 ug/dL    Transferrin 228 200 - 375 mg/dL    TIBC 337 250 - 450 ug/dL    Saturated Iron 7 (L) 20 - 50 %   POCT glucose    Collection Time: 09/19/24  7:22 AM   Result Value Ref Range    POCT Glucose 291 (H) 70 - 110 mg/dL   POCT glucose    Collection Time: 09/19/24 11:49 AM   Result Value Ref Range    POCT Glucose 290 (H) 70 - 110 mg/dL   COVID-19 Rapid Screening    Collection Time: 09/19/24 12:43 PM   Result Value Ref Range    SARS-CoV-2 RNA, Amplification, Qual Negative Negative   POCT glucose    Collection Time: 09/19/24  4:52 PM   Result Value Ref Range    POCT Glucose 259 (H) 70 - 110 mg/dL   POCT glucose    Collection Time: 09/19/24  7:19 PM   Result Value Ref Range    POCT Glucose 249 (H) 70 - 110 mg/dL   Hemoglobin     Collection Time: 09/20/24  4:59 AM   Result Value Ref Range    Hemoglobin 8.6 (L) 12.0 - 16.0 g/dL   Hematocrit    Collection Time: 09/20/24  4:59 AM   Result Value Ref Range    Hematocrit 26.5 (L) 37.0 - 48.5 %   Basic metabolic panel    Collection Time: 09/20/24  4:59 AM   Result Value Ref Range    Sodium 133 (L) 136 - 145 mmol/L    Potassium 4.3 3.5 - 5.1 mmol/L    Chloride 93 (L) 95 - 110 mmol/L    CO2 29 23 - 29 mmol/L    Glucose 187 (H) 70 - 110 mg/dL    BUN 10 8 - 23 mg/dL    Creatinine 0.6 0.5 - 1.4 mg/dL    Calcium 8.8 8.7 - 10.5 mg/dL    Anion Gap 11 8 - 16 mmol/L    eGFR >60 >60 mL/min/1.73 m^2   Magnesium    Collection Time: 09/20/24  4:59 AM   Result Value Ref Range    Magnesium 2.0 1.6 - 2.6 mg/dL   Phosphorus    Collection Time: 09/20/24  4:59 AM   Result Value Ref Range    Phosphorus 3.3 2.7 - 4.5 mg/dL   CBC Auto Differential    Collection Time: 09/20/24  4:59 AM   Result Value Ref Range    WBC 6.45 3.90 - 12.70 K/uL    RBC 2.90 (L) 4.00 - 5.40 M/uL    Hemoglobin 8.6 (L) 12.0 - 16.0 g/dL    Hematocrit 26.5 (L) 37.0 - 48.5 %    MCV 91 82 - 98 fL    MCH 29.7 27.0 - 31.0 pg    MCHC 32.5 32.0 - 36.0 g/dL    RDW 14.4 11.5 - 14.5 %    Platelets 265 150 - 450 K/uL    MPV 10.9 9.2 - 12.9 fL    Immature Granulocytes 0.6 (H) 0.0 - 0.5 %    Gran # (ANC) 4.4 1.8 - 7.7 K/uL    Immature Grans (Abs) 0.04 0.00 - 0.04 K/uL    Lymph # 0.8 (L) 1.0 - 4.8 K/uL    Mono # 1.1 (H) 0.3 - 1.0 K/uL    Eos # 0.1 0.0 - 0.5 K/uL    Baso # 0.03 0.00 - 0.20 K/uL    nRBC 0 0 /100 WBC    Gran % 68.2 38.0 - 73.0 %    Lymph % 12.1 (L) 18.0 - 48.0 %    Mono % 16.9 (H) 4.0 - 15.0 %    Eosinophil % 1.7 0.0 - 8.0 %    Basophil % 0.5 0.0 - 1.9 %    Differential Method Automated    POCT glucose    Collection Time: 09/20/24  7:28 AM   Result Value Ref Range    POCT Glucose 163 (H) 70 - 110 mg/dL   POCT glucose    Collection Time: 09/20/24 11:16 AM   Result Value Ref Range    POCT Glucose 262 (H) 70 - 110 mg/dL       Microbiology Results  (last 7 days)       ** No results found for the last 168 hours. **            Imaging Results              X-Ray Femur 2 View Left (Final result)  Result time 09/14/24 16:04:03      Final result by Sony Greene MD (09/14/24 16:04:03)                   Impression:      1. Proximal femoral fracture as described.      Electronically signed by: Sony Greene MD  Date:    09/14/2024  Time:    16:04               Narrative:    EXAMINATION:  XR FEMUR 2 VIEW LEFT    CLINICAL HISTORY:  hip fracture;    TECHNIQUE:  AP and lateral views of the left femur were performed.    COMPARISON:  None}    FINDINGS:  Four views left femur.    There is fracture involving the femoral neck at the superior margin of the intertrochanteric region noting some impaction.  The left femoral head maintains appropriate relationship with the acetabulum.  There is vascular calcification.  There is osteopenia.  Left knee arthroplasty appears intact.  No large knee joint effusion.                                       X-Ray Chest AP Portable (Final result)  Result time 09/14/24 14:55:42      Final result by Sony Greene MD (09/14/24 14:55:42)                   Impression:      1. Chronic appearing interstitial findings, no large focal consolidation.      Electronically signed by: Sony Greene MD  Date:    09/14/2024  Time:    14:55               Narrative:    EXAMINATION:  XR CHEST AP PORTABLE    CLINICAL HISTORY:  Chest Pain;    TECHNIQUE:  Single frontal view of the chest was performed.    COMPARISON:  06/23/2024    FINDINGS:  The cardiomediastinal silhouette is not enlarged noting calcification of the aorta..  There is no pleural effusion.  The trachea is midline.  The lungs are symmetrically expanded bilaterally with coarse interstitial attenuation.  No large focal consolidation seen.  There is no pneumothorax.  The osseous structures are remarkable for degenerative change..                                       CT Head Without  Contrast (Final result)  Result time 09/14/24 14:11:14      Final result by Sony Greene MD (09/14/24 14:11:14)                   Impression:      1. Allowing for motion artifact, no convincing acute intracranial abnormalities noting sequela of chronic microvascular ischemic change and senescent change.  2. Sinus disease.      Electronically signed by: Sony Greene MD  Date:    09/14/2024  Time:    14:11               Narrative:    EXAMINATION:  CT HEAD WITHOUT CONTRAST    CLINICAL HISTORY:  Head trauma, minor (Age >= 65y);    TECHNIQUE:  Low dose axial images were obtained through the head.  Coronal and sagittal reformations were also performed. Contrast was not administered.    COMPARISON:  06/23/2024    FINDINGS:  There is motion artifact.    There is generalized cerebral volume loss.  There is hypoattenuation in a periventricular fashion, likely sequela of chronic microvascular ischemic change.There are a few punctate foci of low attenuation within the left basal ganglia, may reflect sequela of remote infarct or prominent perivascular spaces.  There is no evidence of acute major vascular territory infarct, hemorrhage, or mass.  There is no hydrocephalus.  There are no abnormal extra-axial fluid collections.  There is fluid within the left sphenoid sinus and trace opacification of the inferior most left mastoid air cells, otherwise the visualized paranasal sinuses and mastoid air cells are clear, and there is no evidence of calvarial fracture.  The visualized soft tissues are unremarkable.                                       CT Hip Without Contrast Left (Final result)  Result time 09/14/24 14:06:17      Final result by Sony Greene MD (09/14/24 14:06:17)                   Impression:      1. Proximal left femoral fracture as described.      Electronically signed by: Sony Greene MD  Date:    09/14/2024  Time:    14:06               Narrative:    EXAMINATION:  CT HIP WITHOUT CONTRAST  LEFT    CLINICAL HISTORY:  Fracture, hip;    TECHNIQUE:  Axial images of the left hip were obtained at 1.25 mm intervals without administration of IV contrast.  Coronal and sagittal reformatted images were reviewed.    COMPARISON:  Radiograph 09/14/2024    FINDINGS:  There is motion artifact.  There is osteopenia.  There is acute appearing impacted fracture involving the base of the left femoral neck, with planes extending to the greater trochanter and intertrochanteric region.  The left femoral head otherwise maintains appropriate relationship with the glenoid.  There are degenerative changes of the pubic symphysis.  The left sacroiliac joint is intact.  There is edema about the femoral fracture site.  The urinary bladder is distended without wall thickening.  There is vascular calcification.                                       X-Ray Hip 2 or 3 views Left with Pelvis when performed (Final result)  Result time 09/14/24 13:32:00      Final result by Sony Greene MD (09/14/24 13:32:00)                   Impression:      1. Proximal left femoral fracture as described.      Electronically signed by: Sony Greene MD  Date:    09/14/2024  Time:    13:32               Narrative:    EXAMINATION:  XR HIP WITH PELVIS WHEN PERFORMED 2 OR 3 VIEWS LEFT    CLINICAL HISTORY:  Pain in left hip    TECHNIQUE:  AP view of the pelvis and frog leg lateral view of the left hip were performed.    COMPARISON:  None    FINDINGS:  Two views left hip.    There is fracture involving the left femoral neck at the level of the trochanter.  On the oblique view, fracture planes appear to involve the intertrochanteric region.  No left femoroacetabular dislocation.  The sacroiliac joints are intact.  The right femoroacetabular joint is intact.  There is vascular calcification.                                          Pending Diagnostic Studies:       Procedure Component Value Units Date/Time    EKG 12-lead [[id number]]     Order Status:  Sent Lab Status: No result            Medications:  Reconciled Home Medications:      Medication List        START taking these medications      aspirin 81 MG EC tablet  Commonly known as: ECOTRIN  Take 1 tablet (81 mg total) by mouth 2 (two) times a day.     docusate sodium 100 MG capsule  Commonly known as: COLACE  Take 1 capsule (100 mg total) by mouth 2 (two) times daily.     HYDROcodone-acetaminophen 7.5-325 mg per tablet  Commonly known as: NORCO  Take 1 tablet by mouth every 6 (six) hours as needed for Pain.            CHANGE how you take these medications      amLODIPine 10 MG tablet  Commonly known as: NORVASC  Take 1 tablet (10 mg total) by mouth once daily.  What changed:   medication strength  how much to take            CONTINUE taking these medications      baclofen 10 MG tablet  Commonly known as: LIORESAL  Take 0.5 tablets (5 mg total) by mouth 3 (three) times daily.     omeprazole 20 MG capsule  Commonly known as: PRILOSEC  Take 20 mg by mouth 2 (two) times daily.     pravastatin 40 MG tablet  Commonly known as: PRAVACHOL  Take 40 mg by mouth once daily.              Indwelling Lines/Drains at time of discharge:   Lines/Drains/Airways       Drain  Duration             Female External Urinary Catheter w/ Suction 09/17/24 1900 2 days                    Time spent on the discharge of patient: Greater than 35 minutes         Dick Ham DO  Department of Hospital Medicine  [facility]

## 2024-09-20 NOTE — NURSING
[facility] [place] [place] -- 4 [place]      9/19/2024       Skin assessed on: Q Shift      [x] No Pressure Injuries Present    []Prevention Measures Documented    [] Yes LDA  for Pressure Injury Previously documented     [] Yes New Pressure Injury Discovered   [] LDA for New Pressure Injury Added      Attending RN:  Montserrat Wiley RN     Second RN:  BITA Kunz

## 2024-09-20 NOTE — ASSESSMENT & PLAN NOTE
Chronic, controlled.   -resume home amlodipine   -BP uncontrolled, titrate home amlodipine. Increased to 10mg on 09/15  -blood pressure better controlled  -IV hydralazine PRN for SBP>180    Latest blood pressure and vitals reviewed-     Temp:  [97.2 °F (36.2 °C)-98.7 °F (37.1 °C)]   Pulse:  [80-97]   Resp:  [14-20]   BP: (133-171)/(66-73)   SpO2:  [92 %-96 %] .   Home meds for hypertension were reviewed and noted below.   Hypertension Medications               amLODIPine (NORVASC) 5 MG tablet Take 1 tablet (5 mg total) by mouth once daily.            While in the hospital, will manage blood pressure as follows; Continue home antihypertensive regimen    Will utilize p.r.n. blood pressure medication only if patient's blood pressure greater than 180/110 and she develops symptoms such as worsening chest pain or shortness of breath.

## 2024-09-20 NOTE — PLAN OF CARE
Case Management Final Discharge Note      Discharge Disposition: SNF ([facility])    New DME ordered / company name: none    Relevant SDOH / Transition of Care Barriers:  none    Primary Caretaker and contact information: Marni Carranza (Daughter)  [phone] (Mobile)     Scheduled followup appointment: PCP    Referrals placed: None    Transportation: ADT 30 order placed for [employer] Transportation.  Requested  time:3:30pm  If transportation does not arrive at ETA time nurse will be instructed to follow protocol for transportation below:  How can I get in touch directly with dispatch, if needed?                 Non-emergent (stretcher): [phone]  option 6     ++NURSING:  If Stretcher does not arrive at requested time please call the above Non Emergent Dispatcher.  If issue not resolved please escalate to your charge nurse for further instructions.      Patient and family educated on discharge services and updated on DC plan. Bedside RN notified, patient clear to discharge from Case Management Perspective. Call report to [phone] Room#135 ask to talk to the nurse for that room. SNF authorization [id number]     09/20/24 1431   Final Note   Assessment Type Final Discharge Note   Anticipated Discharge Disposition SNF  ([facility])   What phone number can be called within the next 1-3 days to see how you are doing after discharge? [phone]   Hospital Resources/Appts/Education Provided Appointments scheduled and added to AVS;Post-Acute resouces added to AVS   Post-Acute Status   Post-Acute Authorization Placement  (SNF)   Post-Acute Placement Status Set-up Complete/Auth obtained   Coverage PHN   Patient choice form signed by patient/caregiver List with quality metrics by geographic area provided   Discharge Delays None known at this time

## 2024-09-20 NOTE — NURSING
PER handoff received from BITA Mariano     Pt resting in bed quietly. NAD noted. No c/o pain.     Fall and safety precautions maintained. Bed alarm activated and audible.. Bed locked in lowest position, with side rails up x2. Call bell and personal items within reach

## 2024-11-07 ENCOUNTER — HOSPITAL ENCOUNTER (OUTPATIENT)
Dept: RADIOLOGY | Facility: HOSPITAL | Age: 84
Discharge: HOME | End: 2024-11-07
Attending: ORTHOPAEDIC SURGERY
Payer: MEDICARE

## 2024-11-07 DIAGNOSIS — S72.142A CLOSED INTERTROCHANTERIC FRACTURE OF LEFT FEMUR: ICD-10-CM

## 2024-11-07 PROCEDURE — 73700 CT LOWER EXTREMITY W/O DYE: CPT | Mod: 26,LT,, | Performed by: RADIOLOGY

## 2024-11-07 PROCEDURE — 73700 CT LOWER EXTREMITY W/O DYE: CPT | Mod: TC,LT

## 2025-03-18 ENCOUNTER — HOSPITAL ENCOUNTER (EMERGENCY)
Facility: HOSPITAL | Age: 85
Discharge: HOME | End: 2025-03-18
Attending: EMERGENCY MEDICINE
Payer: MEDICARE

## 2025-03-18 VITALS
SYSTOLIC BLOOD PRESSURE: 153 MMHG | WEIGHT: 128 LBS | BODY MASS INDEX: 22.68 KG/M2 | OXYGEN SATURATION: 97 % | TEMPERATURE: 99 F | DIASTOLIC BLOOD PRESSURE: 64 MMHG | HEART RATE: 76 BPM | RESPIRATION RATE: 22 BRPM | HEIGHT: 63 IN

## 2025-03-18 DIAGNOSIS — E16.2 HYPOGLYCEMIA: ICD-10-CM

## 2025-03-18 LAB
ALBUMIN SERPL BCP-MCNC: 2.8 G/DL (ref 3.5–5.2)
ALP SERPL-CCNC: 69 U/L (ref 40–150)
ALT SERPL W/O P-5'-P-CCNC: 11 U/L (ref 10–44)
ANION GAP SERPL CALC-SCNC: 14 MMOL/L (ref 8–16)
AST SERPL-CCNC: 17 U/L (ref 10–40)
BASOPHILS # BLD AUTO: 0.01 K/UL (ref 0–0.2)
BASOPHILS NFR BLD: 0.1 % (ref 0–1.9)
BILIRUB SERPL-MCNC: 0.2 MG/DL (ref 0.1–1)
BILIRUB UR QL STRIP: NEGATIVE
BUN SERPL-MCNC: 11 MG/DL (ref 8–23)
CALCIUM SERPL-MCNC: 7.8 MG/DL (ref 8.7–10.5)
CHLORIDE SERPL-SCNC: 105 MMOL/L (ref 95–110)
CLARITY UR: CLEAR
CO2 SERPL-SCNC: 20 MMOL/L (ref 23–29)
COLOR UR: COLORLESS
CREAT SERPL-MCNC: 0.6 MG/DL (ref 0.5–1.4)
DIFFERENTIAL METHOD BLD: ABNORMAL
EOSINOPHIL # BLD AUTO: 0 K/UL (ref 0–0.5)
EOSINOPHIL NFR BLD: 0.5 % (ref 0–8)
ERYTHROCYTE [DISTWIDTH] IN BLOOD BY AUTOMATED COUNT: 15.7 % (ref 11.5–14.5)
EST. GFR  (NO RACE VARIABLE): >60 ML/MIN/1.73 M^2
GLUCOSE SERPL-MCNC: 210 MG/DL (ref 70–110)
GLUCOSE UR QL STRIP: ABNORMAL
HCT VFR BLD AUTO: 31.1 % (ref 37–48.5)
HGB BLD-MCNC: 10.5 G/DL (ref 12–16)
HGB UR QL STRIP: NEGATIVE
IMM GRANULOCYTES # BLD AUTO: 0.05 K/UL (ref 0–0.04)
IMM GRANULOCYTES NFR BLD AUTO: 0.6 % (ref 0–0.5)
KETONES UR QL STRIP: NEGATIVE
LEUKOCYTE ESTERASE UR QL STRIP: NEGATIVE
LYMPHOCYTES # BLD AUTO: 1 K/UL (ref 1–4.8)
LYMPHOCYTES NFR BLD: 12.4 % (ref 18–48)
MCH RBC QN AUTO: 30.3 PG (ref 27–31)
MCHC RBC AUTO-ENTMCNC: 33.8 G/DL (ref 32–36)
MCV RBC AUTO: 90 FL (ref 82–98)
MONOCYTES # BLD AUTO: 0.9 K/UL (ref 0.3–1)
MONOCYTES NFR BLD: 11 % (ref 4–15)
NEUTROPHILS # BLD AUTO: 6.2 K/UL (ref 1.8–7.7)
NEUTROPHILS NFR BLD: 75.4 % (ref 38–73)
NITRITE UR QL STRIP: NEGATIVE
NRBC BLD-RTO: 0 /100 WBC
PH UR STRIP: 5 [PH] (ref 5–8)
PLATELET # BLD AUTO: 377 K/UL (ref 150–450)
PMV BLD AUTO: 10.1 FL (ref 9.2–12.9)
POCT GLUCOSE: 206 MG/DL (ref 70–110)
POCT GLUCOSE: 212 MG/DL (ref 70–110)
POCT GLUCOSE: 230 MG/DL (ref 70–110)
POCT GLUCOSE: 294 MG/DL (ref 70–110)
POTASSIUM SERPL-SCNC: 3.9 MMOL/L (ref 3.5–5.1)
PROT SERPL-MCNC: 6.9 G/DL (ref 6–8.4)
PROT UR QL STRIP: NEGATIVE
RBC # BLD AUTO: 3.47 M/UL (ref 4–5.4)
SODIUM SERPL-SCNC: 139 MMOL/L (ref 136–145)
SP GR UR STRIP: 1.01 (ref 1–1.03)
TROPONIN I SERPL DL<=0.01 NG/ML-MCNC: <0.006 NG/ML (ref 0–0.03)
URN SPEC COLLECT METH UR: ABNORMAL
UROBILINOGEN UR STRIP-ACNC: NEGATIVE EU/DL
WBC # BLD AUTO: 8.25 K/UL (ref 3.9–12.7)

## 2025-03-18 PROCEDURE — 81003 URINALYSIS AUTO W/O SCOPE: CPT | Performed by: EMERGENCY MEDICINE

## 2025-03-18 PROCEDURE — 93005 ELECTROCARDIOGRAM TRACING: CPT

## 2025-03-18 PROCEDURE — 84484 ASSAY OF TROPONIN QUANT: CPT | Performed by: EMERGENCY MEDICINE

## 2025-03-18 PROCEDURE — 82962 GLUCOSE BLOOD TEST: CPT

## 2025-03-18 PROCEDURE — 85025 COMPLETE CBC W/AUTO DIFF WBC: CPT | Performed by: EMERGENCY MEDICINE

## 2025-03-18 PROCEDURE — 93010 ELECTROCARDIOGRAM REPORT: CPT | Mod: ,,, | Performed by: INTERNAL MEDICINE

## 2025-03-18 PROCEDURE — 99284 EMERGENCY DEPT VISIT MOD MDM: CPT | Mod: 25

## 2025-03-18 PROCEDURE — 80053 COMPREHEN METABOLIC PANEL: CPT | Performed by: EMERGENCY MEDICINE

## 2025-03-18 NOTE — ED NOTES
Assumed pt care. Pt arrived via EMS from [facility], with complaint of hypoglycemia. Per EMS, pt BS was 49, and given po glucose. Pt  when assumed pt care. Pt alert and oriented x3. Side rails upx2 with call light within reach. Family at bedside

## 2025-03-18 NOTE — ED PROVIDER NOTES
Encounter Date: 3/18/2025    SCRIBE #1 NOTE: I, Homero Todd Do, am scribing for, and in the presence of,  Phillip Gilbert MD. I have scribed the following portions of the note - Other sections scribed: HPI, ROS, PE.       History     Chief Complaint   Patient presents with    Hypoglycemia     Pt arrived via ems, pt chief complaint is hypoglycemia. Pt was found altered by nursing home staff, pt was assessed by ems and found to have a low cbg of 49. Pt was treated with oral glucose and d50 and last glucose check was 219.        84-year-old female history of diabetes mellitus, hyperlipidemia, hypertension, CAD, and CHF (diastolic dysfunction), who presents to the ED via EMS with altered mental status onset today. Per chart review 02/25/2025, patient was seen and admitted at [facility] following left hip conversion to left total hip arthroplasty. Patient discharged 03/13/2025 to [facility]. Per EMS, independent historian, they report the patient is currently at [facility] where nursing staff found the patient altered and contacted EMS. EMS notes the patient appeared altered with a CBG of 49 mg/dL. Patient given D50 and oral glucose en-route with improvement to 219 mg/dL. Patient reports feeling well inside the ED. Patient is actively walking inside her ED room. No other exacerbating or alleviating factors.     The history is provided by the patient and the EMS personnel. No  was used.     Review of patient's allergies indicates:  No Known Allergies  Past Medical History:   Diagnosis Date    Diabetes mellitus     Hypertension      Past Surgical History:   Procedure Laterality Date    HYSTERECTOMY      INTRAMEDULLARY RODDING OF FEMUR Left 9/15/2024    Procedure: INSERTION, INTRAMEDULLARY GONZALEZ, FEMUR;  Surgeon: Jose Alberto Caal MD;  Location: [facility] OR;  Service: Orthopedics;  Laterality: Left;    OOPHORECTOMY       Family History   Problem  Relation Name Age of Onset    Breast cancer Sister       Social History[1]  Review of Systems   Constitutional:  Negative for chills and fever.   HENT:  Negative for congestion.    Respiratory:  Negative for cough and shortness of breath.    Cardiovascular:  Negative for chest pain.   Gastrointestinal:  Negative for abdominal pain and vomiting.   Genitourinary:  Negative for difficulty urinating, dysuria and frequency.   Musculoskeletal:  Negative for arthralgias and back pain.   Skin:  Negative for rash.   Neurological:  Negative for headaches.   Psychiatric/Behavioral:  Positive for confusion (Resolved).        Physical Exam     Initial Vitals [03/18/25 1802]   BP Pulse Resp Temp SpO2   (!) 165/55 82 18 98.5 °F (36.9 °C) 100 %      MAP       --         Physical Exam    Nursing note and vitals reviewed.  Constitutional: She appears well-developed and well-nourished. She does not appear ill. No distress.   HENT:   Head: Normocephalic.   Eyes: EOM are normal.   Neck:   Normal range of motion.  Cardiovascular:  Normal rate, regular rhythm and normal heart sounds.           No murmur heard.  Pulmonary/Chest: Breath sounds normal. No respiratory distress. She has no wheezes.   Posterior and anterior lungs are clear to auscultation.      Abdominal: Abdomen is soft. There is no abdominal tenderness.   Musculoskeletal:         General: No edema.      Cervical back: Normal range of motion.     Neurological: She is alert.   Negative dysarthria. No facial asymmetry.    Skin: Skin is warm.   Non-tender left hip wound with surrounding dry skin and mild erythema without drainage, increased warmth, or induration.          ED Course   Procedures  Labs Reviewed   CBC W/ AUTO DIFFERENTIAL - Abnormal       Result Value    WBC 8.25      RBC 3.47 (*)     Hemoglobin 10.5 (*)     Hematocrit 31.1 (*)     MCV 90      MCH 30.3      MCHC 33.8      RDW 15.7 (*)     Platelets 377      MPV 10.1      Immature Granulocytes 0.6 (*)     Gran #  (ANC) 6.2      Immature Grans (Abs) 0.05 (*)     Lymph # 1.0      Mono # 0.9      Eos # 0.0      Baso # 0.01      nRBC 0      Gran % 75.4 (*)     Lymph % 12.4 (*)     Mono % 11.0      Eosinophil % 0.5      Basophil % 0.1      Differential Method Automated     COMPREHENSIVE METABOLIC PANEL - Abnormal    Sodium 139      Potassium 3.9      Chloride 105      CO2 20 (*)     Glucose 210 (*)     BUN 11      Creatinine 0.6      Calcium 7.8 (*)     Total Protein 6.9      Albumin 2.8 (*)     Total Bilirubin 0.2      Alkaline Phosphatase 69      AST 17      ALT 11      eGFR >60      Anion Gap 14     URINALYSIS, REFLEX TO URINE CULTURE - Abnormal    Specimen UA Urine, Clean Catch      Color, UA Colorless (*)     Appearance, UA Clear      pH, UA 5.0      Specific Gravity, UA 1.010      Protein, UA Negative      Glucose, UA 2+ (*)     Ketones, UA Negative      Bilirubin (UA) Negative      Occult Blood UA Negative      Nitrite, UA Negative      Urobilinogen, UA Negative      Leukocytes, UA Negative      Narrative:     Specimen Source->Urine   POCT GLUCOSE - Abnormal    POCT Glucose 206 (*)    POCT GLUCOSE - Abnormal    POCT Glucose 294 (*)    POCT GLUCOSE - Abnormal    POCT Glucose 212 (*)    POCT GLUCOSE - Abnormal    POCT Glucose 230 (*)    TROPONIN I    Troponin I <0.006          ECG Results              EKG 12-lead (Final result)        Collection Time Result Time QRS Duration OHS QTC Calculation    03/18/25 18:56:54 03/19/25 18:54:24 72 461                     Final result by Interface, Lab In [facility] (03/19/25 18:54:32)                   Narrative:    Test Reason : E16.2,    Vent. Rate :  85 BPM     Atrial Rate :  85 BPM     P-R Int : 250 ms          QRS Dur :  72 ms      QT Int : 388 ms       P-R-T Axes :  72  13  80 degrees    QTcB Int : 461 ms    Sinus rhythm with 1st degree A-V block  Septal infarct (cited on or before 11-Oct-2019)  Abnormal ECG  When compared with ECG of 14-Sep-2024 14:18,  No significant change was  found  Confirmed by Kwaku Jimenez ([id number]) on 3/19/2025 6:54:23 PM    Referred By: AAAREFERRAL SELF           Confirmed By: Kwaku Jimenez                                  Imaging Results    None          Medications - No data to display  Medical Decision Making    84-year-old female presenting for reported episode of hypoglycemia.  The patient recently received a left hip arthroplasty.  Patient is currently at a rehab facility.  On exam the wound does have some mild erythema but no warmth induration or tenderness to suggest infection.  The patient has good mobility of the left hip.  The patient denies any pain with ambulation.  The patient is not confused during our discussion.  Patient is well-appearing.  Patient was monitored for 4 hours with multiple blood glucose checks with no signs of hypoglycemia.  The patient was provided juice and food.  Patient is currently on metformin and glipizide.  Patient stated that she had not eaten well throughout the day.  She denies any symptoms recently.  She reports she is recovering well and up and ambulating at the physical rehabilitation facility.  I discussed holding the glipizide for the time being and using altered metformin to prevent further episodes of hypoglycemia.  This information was placed in a discharge paperwork and informed with the patient and patient's family.      I do not suspect any infectious source as the wound is well healing, the patient denies any respiratory, urinary, gastrointestinal symptoms.    The patient denies any chest pain or dyspnea suggest any cardiac cause.  Patient does not display any neurological deficits to suggest CVA.        Medical Decision Making:     A. Problem List:  1. Hypoglycemia     B. Differential diagnosis:    Glipizide adverse effect, not eating, infection    E. Review of Previous External Medical Record:  Review of recent outside hospitalization records.      Part of the note was done using electronic dictation services.            Amount and/or Complexity of Data Reviewed  Independent Historian: EMS     Details: See HPI.  External Data Reviewed: notes.     Details: See HPI.   Labs: ordered. Decision-making details documented in ED Course.  ECG/medicine tests: ordered and independent interpretation performed. Decision-making details documented in ED Course.            Scribe Attestation:   Scribe #1: I performed the above scribed service and the documentation accurately describes the services I performed. I attest to the accuracy of the note.        ED Course as of 03/19/25 2048   Tue Mar 18, 2025   [address] [place] [JM]   1920 Patient is on metformin, glipizide. [JM]   2023 Calcium(!): 7.8  Corrected for hypoalbuminemia 8.4. [JM]   2226 The patient reports she is still feels well.  No confusion noted.  I discussed holding the glipizide. [JM]      ED Course User Index  [JM] Phillip Gilbert MD                           Clinical Impression:  Final diagnoses:  [E16.2] Hypoglycemia       I, Phillip Gilbert, personally performed the services described in this documentation. All medical record entries made by the scribe were at my direction and in my presence. I have reviewed the chart and agree that the record reflects my personal performance and is accurate and complete.      DISCLAIMER: This note was prepared with [employer] voice recognition transcription software. Garbled syntax, mangled pronouns, and other bizarre constructions may be attributed to that software system.     ED Disposition Condition    Discharge Stable          ED Prescriptions    None       Follow-up Information       Follow up With Specialties Details Why Contact Info    Phillip Levy MD Internal Medicine Schedule an appointment as soon as possible for a visit in 2 days Primary care [address]  [address]  [facility] [address]  [phone]      [facility] [place] [place] Emergency Dept Emergency Medicine  If symptoms worsen [address] [facility]  [facility] [id number] [phone]                 [1]   Social History  Tobacco Use    Smoking status: Never    Smokeless tobacco: Never   Substance Use Topics    Alcohol use: Never    Drug use: Never        Phillip Gilbert MD  03/19/25 2045

## 2025-03-19 LAB
OHS QRS DURATION: 72 MS
OHS QTC CALCULATION: 461 MS

## 2025-03-19 NOTE — ED NOTES
Pt expressed going home with son instead of going back to [facility]. Pt states they are not caring for me well, and holding my medication, I would like to go home. Pt is alert and oriented x3.

## 2025-05-05 NOTE — NURSING
"RAPID RESPONSE NURSE PROACTIVE ROUNDING NOTE       Time of Visit:     Admit Date: 2024  LOS: 3  Code Status: Full Code   Date of Visit: 2024  : 1940  Age: 84 y.o.  Sex: female  Race: White  Bed: W434/W434 A:   MRN: [id number]  Was the patient discharged from an ICU this admission? No   Was the patient discharged from a PACU within last 24 hours? No   Did the patient receive conscious sedation/general anesthesia in last 24 hours? No   Was the patient in the ED within the past 24 hours? No   Was the patient on NIPPV within the past 24 hours? No   Attending Physician: Dick Ham DO  Primary Service: Hospitalist   Time spent at the bedside: 30 - 45 min    SITUATION    Notified by Epic patient alert  Reason for alert: MEWS 3    Diagnosis: Closed fracture of left hip   has a past medical history of Diabetes mellitus and Hypertension.    Last Vitals:  Temp: 98.4 °F (36.9 °C) (2124)  Pulse: 109 (2335)  Resp: 16 (2142)  BP: 136/63 (2218)  SpO2: 94 % (2335)    24 Hour Vitals Range:  Temp:  [97.9 °F (36.6 °C)-98.9 °F (37.2 °C)]   Pulse:  []   Resp:  [16-18]   BP: (111-152)/(56-71)   SpO2:  [90 %-94 %]     Clinical Issues: Respiratory    ASSESSMENT/INTERVENTIONS    Pt sats charted in 60s, sent message to primary RN and went to bedside to assess. Pt lying in bed and arousable. Pt denies SOB. Lung sounds coarse. Spo2 60s on finger, 80s on forehead. Placed on 2-4 L NC with improvement of sats to 90s. MD rivas notified. Ordered stat CXR, ABG, and 20 mg IV lasix. Pt had received 1 unit PRBC on day shift with improvement in H/H, repeat H/H ordered for now. CXR showed "Right basilar opacity, which may be related to atelectasis or early infiltrate". ABG showed:    Latest Reference Range & Units 09/16/24 22:24   POC PH 7.35 - 7.45  7.442   POC PCO2 35 - 45 mmHg 37.1   POC PO2 80 - 100 mmHg 49 (LL)   POC HCO3 24 - 28 mmol/L 25.4   POC SATURATED O2 95 - 100 % 86   Sample  " Goal Outcome Evaluation:         Pt alert and oriented X4, able to make needs known. No c/o chest pain, SOB, N/V. LVAD parameters met. Wound vac intact. No alarms. IV antibiotics infused without issue. R PICC dressing changed. Will continue with POC.          Patient's most recent vital signs are:     Vital signs:  BP: [MAP 80[  Temp: 97.5  HR: 60  RR: 18  SpO2: 98 %     Patient does not have new respiratory symptoms.  Patient does not have new sore throat.  Patient does not have a fever greater than 99.5.                ARTERIAL   POC TCO2 23 - 27 mmol/L 26   POC BE -2 to 2 mmol/L 1   FiO2  32   Flow  3   DelSys  Nasal Can   Site  RB   Mode  SPONT   (LL): Data is critically low    RT bumped pt to 4 L NC from 3L. Pt SBP 130s. MD Love at bedside to assess, ordered additional 20 mg IV Lasix. Orders placed for continuous SpO2 monitoring.        RECOMMENDATIONS  See above    Discussed plan of care with bedside RNCamille.    PROVIDER ESCALATION    Physician escalation: Yes    Orders received and case discussed with Dr. Love .    Disposition:Remain in room 434    FOLLOW UP    Call back the Rapid Response NurseJoaquina at [phone] for additional questions or concerns.

## (undated) DEVICE — SCREW TFN ADVANCE 95MM
Type: IMPLANTABLE DEVICE | Site: FEMUR | Status: NON-FUNCTIONAL
Removed: 2024-09-15

## (undated) DEVICE — PAD TRACTION BOOT

## (undated) DEVICE — DRAPE C-ARM FOR MOBILE XRAY

## (undated) DEVICE — DRAPE STERI-DRAPE 83X125 IOBAN

## (undated) DEVICE — COVER PROXIMA MAYO STAND

## (undated) DEVICE — TOWEL OR XRAY BLUE 17X26IN

## (undated) DEVICE — Device

## (undated) DEVICE — CANISTER SUCTION RIGID 2000CC

## (undated) DEVICE — ELECTRODE REM PLYHSV RETURN 9

## (undated) DEVICE — DRAPE U SPLIT SHEET 54X76IN

## (undated) DEVICE — DRESSING AQUACEL AG ADV 3.5X6

## (undated) DEVICE — SOL NACL IRR 1000ML BTL

## (undated) DEVICE — COVER TABLE 44X90 STERILE

## (undated) DEVICE — ADHESIVE DERMABOND MINI HV

## (undated) DEVICE — COVER OVERHEAD SURG LT BLUE

## (undated) DEVICE — SEE MEDLINE ITEM 157166

## (undated) DEVICE — WIRE GUIDE 3.2MM 400MM
Type: IMPLANTABLE DEVICE | Site: FEMUR | Status: NON-FUNCTIONAL
Removed: 2024-09-15

## (undated) DEVICE — SUT VICRYL 2-0 36 CT-1

## (undated) DEVICE — SUT VICRYL PLUS 0 CT1 36IN

## (undated) DEVICE — BANDAGE ELAS SOFTWRAP ST 6X5YD

## (undated) DEVICE — SUT STRATAFIX SPRL PS-2 3-0

## (undated) DEVICE — SPONGE COTTON TRAY 4X4IN

## (undated) DEVICE — DRAPE STERI U-SHAPED 47X51IN

## (undated) DEVICE — GOWN NONREINF SET-IN SLV XL

## (undated) DEVICE — GLOVE SIGNATURE ESSNTL LTX 8

## (undated) DEVICE — DRESSING COVER AQUACEL AG SURG

## (undated) DEVICE — GLOVE SENSICARE PI GRN 8

## (undated) DEVICE — BLANKET UPPER BODY 78.7X29.9IN

## (undated) DEVICE — POSITIONER IV ARMBOARD FOAM

## (undated) DEVICE — APPLICATOR CHLORAPREP ORN 26ML

## (undated) DEVICE — KIT PT CARE HANA PROFX SSXT